# Patient Record
Sex: FEMALE | Race: WHITE | NOT HISPANIC OR LATINO | Employment: OTHER | URBAN - METROPOLITAN AREA
[De-identification: names, ages, dates, MRNs, and addresses within clinical notes are randomized per-mention and may not be internally consistent; named-entity substitution may affect disease eponyms.]

---

## 2018-10-24 ENCOUNTER — HOSPITAL ENCOUNTER (EMERGENCY)
Facility: HOSPITAL | Age: 83
Discharge: HOME | End: 2018-10-24
Attending: EMERGENCY MEDICINE
Payer: MEDICARE

## 2018-10-24 VITALS
BODY MASS INDEX: 20.29 KG/M2 | HEART RATE: 56 BPM | DIASTOLIC BLOOD PRESSURE: 103 MMHG | SYSTOLIC BLOOD PRESSURE: 151 MMHG | HEIGHT: 62 IN | WEIGHT: 110.25 LBS | RESPIRATION RATE: 14 BRPM | TEMPERATURE: 99 F | OXYGEN SATURATION: 100 %

## 2018-10-24 DIAGNOSIS — H81.10 BENIGN PAROXYSMAL POSITIONAL VERTIGO, UNSPECIFIED LATERALITY: Primary | ICD-10-CM

## 2018-10-24 LAB
ALBUMIN SERPL BCP-MCNC: 3.7 G/DL
ALP SERPL-CCNC: 56 U/L
ALT SERPL W/O P-5'-P-CCNC: 14 U/L
ANION GAP SERPL CALC-SCNC: 9 MMOL/L
AST SERPL-CCNC: 21 U/L
BASOPHILS # BLD AUTO: 0 K/UL
BASOPHILS NFR BLD: 0.6 %
BILIRUB SERPL-MCNC: 0.5 MG/DL
BUN SERPL-MCNC: 20 MG/DL
CALCIUM SERPL-MCNC: 9.1 MG/DL
CHLORIDE SERPL-SCNC: 103 MMOL/L
CO2 SERPL-SCNC: 29 MMOL/L
CREAT SERPL-MCNC: 0.8 MG/DL
DIFFERENTIAL METHOD: ABNORMAL
EOSINOPHIL # BLD AUTO: 0.1 K/UL
EOSINOPHIL NFR BLD: 2.1 %
ERYTHROCYTE [DISTWIDTH] IN BLOOD BY AUTOMATED COUNT: 13.3 %
EST. GFR  (AFRICAN AMERICAN): >60 ML/MIN/1.73 M^2
EST. GFR  (NON AFRICAN AMERICAN): >60 ML/MIN/1.73 M^2
GLUCOSE SERPL-MCNC: 98 MG/DL
HCT VFR BLD AUTO: 36.1 %
HGB BLD-MCNC: 11.9 G/DL
LYMPHOCYTES # BLD AUTO: 1.4 K/UL
LYMPHOCYTES NFR BLD: 28.9 %
MAGNESIUM SERPL-MCNC: 2.3 MG/DL
MCH RBC QN AUTO: 30.5 PG
MCHC RBC AUTO-ENTMCNC: 33.1 G/DL
MCV RBC AUTO: 92 FL
MONOCYTES # BLD AUTO: 0.3 K/UL
MONOCYTES NFR BLD: 6 %
NEUTROPHILS # BLD AUTO: 3 K/UL
NEUTROPHILS NFR BLD: 62.4 %
PLATELET # BLD AUTO: 286 K/UL
PMV BLD AUTO: 6.4 FL
POTASSIUM SERPL-SCNC: 4.8 MMOL/L
PROT SERPL-MCNC: 6.6 G/DL
RBC # BLD AUTO: 3.91 M/UL
SODIUM SERPL-SCNC: 141 MMOL/L
WBC # BLD AUTO: 4.8 K/UL

## 2018-10-24 PROCEDURE — 36415 COLL VENOUS BLD VENIPUNCTURE: CPT | Mod: HCWC

## 2018-10-24 PROCEDURE — 85025 COMPLETE CBC W/AUTO DIFF WBC: CPT | Mod: HCWC

## 2018-10-24 PROCEDURE — 25000003 PHARM REV CODE 250: Mod: HCWC | Performed by: EMERGENCY MEDICINE

## 2018-10-24 PROCEDURE — 83735 ASSAY OF MAGNESIUM: CPT | Mod: HCWC

## 2018-10-24 PROCEDURE — 80053 COMPREHEN METABOLIC PANEL: CPT | Mod: HCWC

## 2018-10-24 PROCEDURE — 99284 EMERGENCY DEPT VISIT MOD MDM: CPT | Mod: 25,HCWC

## 2018-10-24 RX ORDER — MECLIZINE HYDROCHLORIDE 25 MG/1
25 TABLET ORAL
Status: COMPLETED | OUTPATIENT
Start: 2018-10-24 | End: 2018-10-24

## 2018-10-24 RX ORDER — MECLIZINE HYDROCHLORIDE 25 MG/1
25 TABLET ORAL 3 TIMES DAILY PRN
Qty: 20 TABLET | Refills: 0 | Status: ON HOLD | OUTPATIENT
Start: 2018-10-24 | End: 2021-11-29 | Stop reason: SDUPTHER

## 2018-10-24 RX ADMIN — MECLIZINE HYDROCHLORIDE 25 MG: 25 TABLET ORAL at 11:10

## 2018-10-24 NOTE — ED PROVIDER NOTES
Encounter Date: 10/24/2018    SCRIBE #1 NOTE: I, Fernando Nickerson, am scribing for, and in the presence of, Dr. Nolen .       History     Chief Complaint   Patient presents with    Dizziness     x 3 days     Time seen by provider: 10:48 AM on 10/24/2018      Keila Romano is a 86 y.o. female with a PMHx of anemia who presents to the ED for further evaluation of dizziness that has been ongoing for 1 week. Patient states that she has been stressed recently due to a large volume of work in the recent weeks. She states that this morning upon standing up she suddenly became dizzy (room spinning) with a bout of lightheadedness. The patient admits that while at rest the dizziness is resolved. Patient admits that currently she feels that upon standing she would have dizziness and some lightheadedness. Per patient, reports that she did get new glasses 1 week ago but denies headache, fever, nausea, abdominal pain, blood in urine, blood in stool, chest pain, and SOB. Patient has had a blood transfusion. She has no pertinent surgical history.       The history is provided by the patient.     Review of patient's allergies indicates:  No Known Allergies  History reviewed. No pertinent past medical history.  History reviewed. No pertinent surgical history.  History reviewed. No pertinent family history.  Social History     Tobacco Use    Smoking status: Never Smoker   Substance Use Topics    Alcohol use: No     Frequency: Never    Drug use: Not on file     Review of Systems   Constitutional: Negative for fever.   HENT: Negative for congestion.    Eyes: Negative for photophobia and visual disturbance.   Respiratory: Negative for shortness of breath.    Cardiovascular: Negative for chest pain.   Gastrointestinal: Negative for abdominal pain, nausea and vomiting.   Musculoskeletal: Negative for back pain.   Skin: Negative for color change.   Neurological: Positive for dizziness and light-headedness. Negative for weakness and  headaches.   Psychiatric/Behavioral: Negative for confusion.       Physical Exam     Initial Vitals [10/24/18 1014]   BP Pulse Resp Temp SpO2   (!) 138/90 66 14 98.7 °F (37.1 °C) 99 %      MAP       --         Physical Exam    Nursing note and vitals reviewed.  Constitutional: She appears well-developed and well-nourished. She is not diaphoretic. No distress.   HENT:   Head: Normocephalic and atraumatic.   Eyes: EOM are normal. Pupils are equal, round, and reactive to light. Right eye exhibits no nystagmus. Left eye exhibits no nystagmus.   Neck: Normal range of motion. Neck supple.   Cardiovascular: Normal rate, regular rhythm, normal heart sounds and intact distal pulses. Exam reveals no gallop and no friction rub.    No murmur heard.  Pulmonary/Chest: Breath sounds normal. No respiratory distress. She has no wheezes. She has no rhonchi. She has no rales.   Abdominal: Soft. Bowel sounds are normal. There is no tenderness. There is no rebound and no guarding.   Musculoskeletal: Normal range of motion.   Neurological: She is alert and oriented to person, place, and time. GCS eye subscore is 4. GCS verbal subscore is 5. GCS motor subscore is 6.   Positive [place] Hallpike.    Skin: Skin is warm and dry.   Psychiatric: She has a normal mood and affect. Her behavior is normal. Judgment and thought content normal.         ED Course   Procedures  Labs Reviewed   CBC W/ AUTO DIFFERENTIAL - Abnormal; Notable for the following components:       Result Value    RBC 3.91 (*)     Hemoglobin 11.9 (*)     Hematocrit 36.1 (*)     MPV 6.4 (*)     All other components within normal limits   COMPREHENSIVE METABOLIC PANEL   MAGNESIUM          Imaging Results          CT Head Without Contrast (Final result)  Result time 10/24/18 11:59:00    Final result by Louie Burns Jr., MD (10/24/18 11:59:00)                 Impression:      Mild brain atrophy with deep white matter ischemic changes otherwise negative CT of the  head.      Electronically signed by: Louie Burns MD  Date:    10/24/2018  Time:    11:59             Narrative:    EXAMINATION:  CT HEAD WITHOUT CONTRAST    CLINICAL HISTORY:  Dizziness;    TECHNIQUE:  Low dose axial images were obtained through the head.  Coronal and sagittal reformations were also performed. Contrast was not administered.    COMPARISON:  None.    FINDINGS:  No cranial fracture is identified.  Scalp edema or hematoma is not noted.    The basal cisterns are clear and symmetric.  No CP angle mass is demonstrated.  There is no mass effect or midline shift.  There is mild enlargement of the cerebral ventricles and of the cerebral sulci particularly in the frontal lobes consistent with brain atrophy.  Hypodensities in the central white matter consistent with deep white matter ischemic changes.  Focal areas of increased or decreased CT density consistent with tumor, edema, CVA or hemorrhage is not seen.  No intracranial hemorrhage or hematoma is noted.                                 Medical Decision Making:   History:   Old Medical Records: I decided to obtain old medical records.  Initial Assessment:   86-year-old female presented with a chief complaint of dizziness.  Differential Diagnosis:   My differential diagnosis includes intracranial mass, intracranial hemorrhage, worsening anemia, and benign worsening vertigo.   Clinical Tests:   Lab Tests: Ordered and Reviewed  Radiological Study: Ordered and Reviewed  ED Management:  The patient was emergently evaluated in the emergency department, her evaluation was significant for an elderly female with a normal neurologic exam.  The patient does have a positive [place]-Hallpike maneuver noted.  The patient's CT scan of her head showed no acute abnormalities.  The patient's labs were significant for the patient's chronic anemia.  The patient is not orthostatic by vital signs in the emergency department.  Per the patient was treated with p.o. meclizine,  with improvement in her symptoms.  The patient's diagnosis is likely benign positional vertigo.  She is stable for discharge to home.  She will be discharged home with p.o. meclizine and she is referred to ENT for follow-up and further care.            Scribe Attestation:   Scribe #1: I performed the above scribed service and the documentation accurately describes the services I performed. I attest to the accuracy of the note.        I, Dr. Giovanni Nolen, personally performed the services described in this documentation. All medical record entries made by the scribe were at my direction and in my presence.  I have reviewed the chart and agree that the record reflects my personal performance and is accurate and complete. Giovanni Nolen MD.  1:30 PM 10/24/2018          Clinical Impression:   The encounter diagnosis was Benign paroxysmal positional vertigo, unspecified laterality.                             Giovanni Nolen MD  10/24/18 9005       Giovanni Nolen MD  10/24/18 3263

## 2018-10-24 NOTE — ED NOTES
"Pt here for eval of "dizziness" (denies @ rest-- indicates with head mov't/standing) - denies change in dietary habits or oral intake & no known fevers or sweats. Pleasant/cooperative/sociable--accompanied by friend.neighbor  "

## 2019-09-25 ENCOUNTER — PES CALL (OUTPATIENT)
Dept: ADMINISTRATIVE | Facility: CLINIC | Age: 84
End: 2019-09-25

## 2021-11-21 ENCOUNTER — HOSPITAL ENCOUNTER (INPATIENT)
Facility: HOSPITAL | Age: 86
LOS: 8 days | Discharge: NURSING, REHAB OR LONG-TERM CARE | DRG: 481 | End: 2021-11-29
Attending: EMERGENCY MEDICINE | Admitting: HOSPITALIST
Payer: MEDICARE

## 2021-11-21 DIAGNOSIS — Z01.810 PREOPERATIVE CARDIOVASCULAR EXAMINATION: ICD-10-CM

## 2021-11-21 DIAGNOSIS — M25.551 RIGHT HIP PAIN: ICD-10-CM

## 2021-11-21 DIAGNOSIS — S72.001A CLOSED RIGHT HIP FRACTURE, INITIAL ENCOUNTER: Primary | ICD-10-CM

## 2021-11-21 DIAGNOSIS — R07.9 CHEST PAIN: ICD-10-CM

## 2021-11-21 PROBLEM — D64.9 ANEMIA: Status: ACTIVE | Noted: 2021-11-21

## 2021-11-21 LAB
ANION GAP SERPL CALC-SCNC: 13 MMOL/L (ref 8–16)
BASOPHILS # BLD AUTO: 0.02 K/UL (ref 0–0.2)
BASOPHILS NFR BLD: 0.3 % (ref 0–1.9)
BUN SERPL-MCNC: 18 MG/DL (ref 8–23)
CALCIUM SERPL-MCNC: 8.6 MG/DL (ref 8.7–10.5)
CHLORIDE SERPL-SCNC: 104 MMOL/L (ref 95–110)
CO2 SERPL-SCNC: 23 MMOL/L (ref 23–29)
CREAT SERPL-MCNC: 0.8 MG/DL (ref 0.5–1.4)
DIFFERENTIAL METHOD: ABNORMAL
EOSINOPHIL # BLD AUTO: 0 K/UL (ref 0–0.5)
EOSINOPHIL NFR BLD: 0 % (ref 0–8)
ERYTHROCYTE [DISTWIDTH] IN BLOOD BY AUTOMATED COUNT: 13.2 % (ref 11.5–14.5)
EST. GFR  (AFRICAN AMERICAN): >60 ML/MIN/1.73 M^2
EST. GFR  (NON AFRICAN AMERICAN): >60 ML/MIN/1.73 M^2
FERRITIN SERPL-MCNC: 112 NG/ML (ref 20–300)
GLUCOSE SERPL-MCNC: 135 MG/DL (ref 70–110)
HCT VFR BLD AUTO: 33.3 % (ref 37–48.5)
HGB BLD-MCNC: 10.5 G/DL (ref 12–16)
IMM GRANULOCYTES # BLD AUTO: 0.03 K/UL (ref 0–0.04)
IMM GRANULOCYTES NFR BLD AUTO: 0.4 % (ref 0–0.5)
LYMPHOCYTES # BLD AUTO: 0.4 K/UL (ref 1–4.8)
LYMPHOCYTES NFR BLD: 5 % (ref 18–48)
MCH RBC QN AUTO: 29.9 PG (ref 27–31)
MCHC RBC AUTO-ENTMCNC: 31.5 G/DL (ref 32–36)
MCV RBC AUTO: 95 FL (ref 82–98)
MONOCYTES # BLD AUTO: 0.3 K/UL (ref 0.3–1)
MONOCYTES NFR BLD: 3.7 % (ref 4–15)
NEUTROPHILS # BLD AUTO: 7.1 K/UL (ref 1.8–7.7)
NEUTROPHILS NFR BLD: 90.6 % (ref 38–73)
NRBC BLD-RTO: 0 /100 WBC
PLATELET # BLD AUTO: 241 K/UL (ref 150–450)
PMV BLD AUTO: 8.4 FL (ref 9.2–12.9)
POTASSIUM SERPL-SCNC: 3.9 MMOL/L (ref 3.5–5.1)
RBC # BLD AUTO: 3.51 M/UL (ref 4–5.4)
SARS-COV-2 RDRP RESP QL NAA+PROBE: NEGATIVE
SODIUM SERPL-SCNC: 140 MMOL/L (ref 136–145)
WBC # BLD AUTO: 7.82 K/UL (ref 3.9–12.7)

## 2021-11-21 PROCEDURE — 63600175 PHARM REV CODE 636 W HCPCS: Performed by: HOSPITALIST

## 2021-11-21 PROCEDURE — 36415 COLL VENOUS BLD VENIPUNCTURE: CPT | Performed by: HOSPITALIST

## 2021-11-21 PROCEDURE — 99285 EMERGENCY DEPT VISIT HI MDM: CPT | Mod: 25

## 2021-11-21 PROCEDURE — 84466 ASSAY OF TRANSFERRIN: CPT | Performed by: HOSPITALIST

## 2021-11-21 PROCEDURE — 12000002 HC ACUTE/MED SURGE SEMI-PRIVATE ROOM

## 2021-11-21 PROCEDURE — U0002 COVID-19 LAB TEST NON-CDC: HCPCS | Performed by: EMERGENCY MEDICINE

## 2021-11-21 PROCEDURE — 36415 COLL VENOUS BLD VENIPUNCTURE: CPT | Performed by: EMERGENCY MEDICINE

## 2021-11-21 PROCEDURE — 80048 BASIC METABOLIC PNL TOTAL CA: CPT | Performed by: EMERGENCY MEDICINE

## 2021-11-21 PROCEDURE — 93005 ELECTROCARDIOGRAM TRACING: CPT

## 2021-11-21 PROCEDURE — 82728 ASSAY OF FERRITIN: CPT | Performed by: HOSPITALIST

## 2021-11-21 PROCEDURE — 85025 COMPLETE CBC W/AUTO DIFF WBC: CPT | Performed by: EMERGENCY MEDICINE

## 2021-11-21 RX ORDER — POLYETHYLENE GLYCOL 3350 17 G/17G
17 POWDER, FOR SOLUTION ORAL DAILY
Status: DISCONTINUED | OUTPATIENT
Start: 2021-11-22 | End: 2021-11-29 | Stop reason: HOSPADM

## 2021-11-21 RX ORDER — MAG HYDROX/ALUMINUM HYD/SIMETH 200-200-20
30 SUSPENSION, ORAL (FINAL DOSE FORM) ORAL 4 TIMES DAILY PRN
Status: DISCONTINUED | OUTPATIENT
Start: 2021-11-21 | End: 2021-11-29 | Stop reason: HOSPADM

## 2021-11-21 RX ORDER — IBUPROFEN 200 MG
16 TABLET ORAL
Status: DISCONTINUED | OUTPATIENT
Start: 2021-11-21 | End: 2021-11-29 | Stop reason: HOSPADM

## 2021-11-21 RX ORDER — SODIUM,POTASSIUM PHOSPHATES 280-250MG
2 POWDER IN PACKET (EA) ORAL
Status: DISCONTINUED | OUTPATIENT
Start: 2021-11-21 | End: 2021-11-29 | Stop reason: HOSPADM

## 2021-11-21 RX ORDER — GLUCAGON 1 MG
1 KIT INJECTION
Status: DISCONTINUED | OUTPATIENT
Start: 2021-11-21 | End: 2021-11-29 | Stop reason: HOSPADM

## 2021-11-21 RX ORDER — ENOXAPARIN SODIUM 100 MG/ML
40 INJECTION SUBCUTANEOUS EVERY 24 HOURS
Status: DISCONTINUED | OUTPATIENT
Start: 2021-11-22 | End: 2021-11-28

## 2021-11-21 RX ORDER — LANOLIN ALCOHOL/MO/W.PET/CERES
800 CREAM (GRAM) TOPICAL
Status: DISCONTINUED | OUTPATIENT
Start: 2021-11-21 | End: 2021-11-27

## 2021-11-21 RX ORDER — SIMETHICONE 80 MG
1 TABLET,CHEWABLE ORAL 4 TIMES DAILY PRN
Status: DISCONTINUED | OUTPATIENT
Start: 2021-11-21 | End: 2021-11-29 | Stop reason: HOSPADM

## 2021-11-21 RX ORDER — PROCHLORPERAZINE EDISYLATE 5 MG/ML
5 INJECTION INTRAMUSCULAR; INTRAVENOUS EVERY 6 HOURS PRN
Status: DISCONTINUED | OUTPATIENT
Start: 2021-11-21 | End: 2021-11-29 | Stop reason: HOSPADM

## 2021-11-21 RX ORDER — ONDANSETRON 8 MG/1
8 TABLET, ORALLY DISINTEGRATING ORAL EVERY 8 HOURS PRN
Status: DISCONTINUED | OUTPATIENT
Start: 2021-11-21 | End: 2021-11-29 | Stop reason: HOSPADM

## 2021-11-21 RX ORDER — TALC
6 POWDER (GRAM) TOPICAL NIGHTLY PRN
Status: DISCONTINUED | OUTPATIENT
Start: 2021-11-21 | End: 2021-11-29 | Stop reason: HOSPADM

## 2021-11-21 RX ORDER — NALOXONE HCL 0.4 MG/ML
0.02 VIAL (ML) INJECTION
Status: DISCONTINUED | OUTPATIENT
Start: 2021-11-21 | End: 2021-11-23

## 2021-11-21 RX ORDER — MORPHINE SULFATE 2 MG/ML
2 INJECTION, SOLUTION INTRAMUSCULAR; INTRAVENOUS EVERY 4 HOURS PRN
Status: DISCONTINUED | OUTPATIENT
Start: 2021-11-21 | End: 2021-11-23

## 2021-11-21 RX ORDER — MORPHINE SULFATE 4 MG/ML
4 INJECTION, SOLUTION INTRAMUSCULAR; INTRAVENOUS EVERY 4 HOURS PRN
Status: DISCONTINUED | OUTPATIENT
Start: 2021-11-21 | End: 2021-11-23

## 2021-11-21 RX ORDER — ACETAMINOPHEN 325 MG/1
650 TABLET ORAL EVERY 4 HOURS PRN
Status: DISCONTINUED | OUTPATIENT
Start: 2021-11-21 | End: 2021-11-22

## 2021-11-21 RX ORDER — SODIUM CHLORIDE 0.9 % (FLUSH) 0.9 %
10 SYRINGE (ML) INJECTION EVERY 8 HOURS PRN
Status: DISCONTINUED | OUTPATIENT
Start: 2021-11-21 | End: 2021-11-29 | Stop reason: HOSPADM

## 2021-11-21 RX ORDER — IBUPROFEN 200 MG
24 TABLET ORAL
Status: DISCONTINUED | OUTPATIENT
Start: 2021-11-21 | End: 2021-11-29 | Stop reason: HOSPADM

## 2021-11-21 RX ORDER — IPRATROPIUM BROMIDE AND ALBUTEROL SULFATE 2.5; .5 MG/3ML; MG/3ML
3 SOLUTION RESPIRATORY (INHALATION) EVERY 6 HOURS PRN
Status: DISCONTINUED | OUTPATIENT
Start: 2021-11-21 | End: 2021-11-29 | Stop reason: HOSPADM

## 2021-11-21 RX ADMIN — MORPHINE SULFATE 4 MG: 4 INJECTION, SOLUTION INTRAMUSCULAR; INTRAVENOUS at 08:11

## 2021-11-22 ENCOUNTER — ANESTHESIA EVENT (OUTPATIENT)
Dept: SURGERY | Facility: HOSPITAL | Age: 86
DRG: 481 | End: 2021-11-22
Payer: MEDICARE

## 2021-11-22 ENCOUNTER — ANESTHESIA (OUTPATIENT)
Dept: SURGERY | Facility: HOSPITAL | Age: 86
DRG: 481 | End: 2021-11-22
Payer: MEDICARE

## 2021-11-22 PROBLEM — M85.80 OSTEOPENIA: Status: ACTIVE | Noted: 2021-11-22

## 2021-11-22 PROBLEM — E44.0 MALNUTRITION OF MODERATE DEGREE: Status: ACTIVE | Noted: 2021-11-22

## 2021-11-22 LAB
BASOPHILS # BLD AUTO: 0.02 K/UL (ref 0–0.2)
BASOPHILS NFR BLD: 0.3 % (ref 0–1.9)
DIFFERENTIAL METHOD: ABNORMAL
EOSINOPHIL # BLD AUTO: 0.1 K/UL (ref 0–0.5)
EOSINOPHIL NFR BLD: 1 % (ref 0–8)
ERYTHROCYTE [DISTWIDTH] IN BLOOD BY AUTOMATED COUNT: 13.3 % (ref 11.5–14.5)
HCT VFR BLD AUTO: 30.2 % (ref 37–48.5)
HGB BLD-MCNC: 9.5 G/DL (ref 12–16)
IMM GRANULOCYTES # BLD AUTO: 0.02 K/UL (ref 0–0.04)
IMM GRANULOCYTES NFR BLD AUTO: 0.3 % (ref 0–0.5)
IRON SERPL-MCNC: 15 UG/DL (ref 30–160)
LYMPHOCYTES # BLD AUTO: 1 K/UL (ref 1–4.8)
LYMPHOCYTES NFR BLD: 14.6 % (ref 18–48)
MCH RBC QN AUTO: 29.7 PG (ref 27–31)
MCHC RBC AUTO-ENTMCNC: 31.5 G/DL (ref 32–36)
MCV RBC AUTO: 94 FL (ref 82–98)
MONOCYTES # BLD AUTO: 0.5 K/UL (ref 0.3–1)
MONOCYTES NFR BLD: 7.6 % (ref 4–15)
NEUTROPHILS # BLD AUTO: 5.2 K/UL (ref 1.8–7.7)
NEUTROPHILS NFR BLD: 76.2 % (ref 38–73)
NRBC BLD-RTO: 0 /100 WBC
PLATELET # BLD AUTO: 239 K/UL (ref 150–450)
PMV BLD AUTO: 8.8 FL (ref 9.2–12.9)
RBC # BLD AUTO: 3.2 M/UL (ref 4–5.4)
SATURATED IRON: 4 % (ref 20–50)
TOTAL IRON BINDING CAPACITY: 360 UG/DL (ref 250–450)
TRANSFERRIN SERPL-MCNC: 243 MG/DL (ref 200–375)
WBC # BLD AUTO: 6.86 K/UL (ref 3.9–12.7)

## 2021-11-22 PROCEDURE — 63600175 PHARM REV CODE 636 W HCPCS: Performed by: NURSE ANESTHETIST, CERTIFIED REGISTERED

## 2021-11-22 PROCEDURE — 99900035 HC TECH TIME PER 15 MIN (STAT)

## 2021-11-22 PROCEDURE — 12000002 HC ACUTE/MED SURGE SEMI-PRIVATE ROOM

## 2021-11-22 PROCEDURE — 25000003 PHARM REV CODE 250: Performed by: NURSE ANESTHETIST, CERTIFIED REGISTERED

## 2021-11-22 PROCEDURE — 37000009 HC ANESTHESIA EA ADD 15 MINS: Performed by: ORTHOPAEDIC SURGERY

## 2021-11-22 PROCEDURE — 63600175 PHARM REV CODE 636 W HCPCS: Performed by: ORTHOPAEDIC SURGERY

## 2021-11-22 PROCEDURE — C1713 ANCHOR/SCREW BN/BN,TIS/BN: HCPCS | Performed by: ORTHOPAEDIC SURGERY

## 2021-11-22 PROCEDURE — 25000003 PHARM REV CODE 250: Performed by: HOSPITALIST

## 2021-11-22 PROCEDURE — 36415 COLL VENOUS BLD VENIPUNCTURE: CPT | Performed by: HOSPITALIST

## 2021-11-22 PROCEDURE — 99900103 DSU ONLY-NO CHARGE-INITIAL HR (STAT): Performed by: ORTHOPAEDIC SURGERY

## 2021-11-22 PROCEDURE — D9220A PRA ANESTHESIA: Mod: CRNA,,, | Performed by: NURSE ANESTHETIST, CERTIFIED REGISTERED

## 2021-11-22 PROCEDURE — 63600175 PHARM REV CODE 636 W HCPCS: Performed by: HOSPITALIST

## 2021-11-22 PROCEDURE — 27201423 OPTIME MED/SURG SUP & DEVICES STERILE SUPPLY: Performed by: ORTHOPAEDIC SURGERY

## 2021-11-22 PROCEDURE — 37000008 HC ANESTHESIA 1ST 15 MINUTES: Performed by: ORTHOPAEDIC SURGERY

## 2021-11-22 PROCEDURE — C1769 GUIDE WIRE: HCPCS | Performed by: ORTHOPAEDIC SURGERY

## 2021-11-22 PROCEDURE — 36000710: Performed by: ORTHOPAEDIC SURGERY

## 2021-11-22 PROCEDURE — 85025 COMPLETE CBC W/AUTO DIFF WBC: CPT | Performed by: HOSPITALIST

## 2021-11-22 PROCEDURE — 36000711: Performed by: ORTHOPAEDIC SURGERY

## 2021-11-22 PROCEDURE — 27200651 HC AIRWAY, LMA: Performed by: ANESTHESIOLOGY

## 2021-11-22 PROCEDURE — 71000033 HC RECOVERY, INTIAL HOUR: Performed by: ORTHOPAEDIC SURGERY

## 2021-11-22 PROCEDURE — D9220A PRA ANESTHESIA: ICD-10-PCS | Mod: CRNA,,, | Performed by: NURSE ANESTHETIST, CERTIFIED REGISTERED

## 2021-11-22 PROCEDURE — D9220A PRA ANESTHESIA: ICD-10-PCS | Mod: ANES,,, | Performed by: ANESTHESIOLOGY

## 2021-11-22 PROCEDURE — 94761 N-INVAS EAR/PLS OXIMETRY MLT: CPT

## 2021-11-22 PROCEDURE — D9220A PRA ANESTHESIA: Mod: ANES,,, | Performed by: ANESTHESIOLOGY

## 2021-11-22 DEVICE — SCREW CANN 16MM THRD 6.5X90 SS: Type: IMPLANTABLE DEVICE | Site: LEG | Status: FUNCTIONAL

## 2021-11-22 DEVICE — SCREW CANN 16MM THRD 6.5X85 SS: Type: IMPLANTABLE DEVICE | Site: LEG | Status: FUNCTIONAL

## 2021-11-22 RX ORDER — FENTANYL CITRATE 50 UG/ML
INJECTION, SOLUTION INTRAMUSCULAR; INTRAVENOUS
Status: DISCONTINUED | OUTPATIENT
Start: 2021-11-22 | End: 2021-11-22

## 2021-11-22 RX ORDER — ACETAMINOPHEN 10 MG/ML
INJECTION, SOLUTION INTRAVENOUS
Status: DISCONTINUED | OUTPATIENT
Start: 2021-11-22 | End: 2021-11-22

## 2021-11-22 RX ORDER — LIDOCAINE HYDROCHLORIDE 20 MG/ML
INJECTION INTRAVENOUS
Status: DISCONTINUED | OUTPATIENT
Start: 2021-11-22 | End: 2021-11-22

## 2021-11-22 RX ORDER — DEXAMETHASONE SODIUM PHOSPHATE 4 MG/ML
INJECTION, SOLUTION INTRA-ARTICULAR; INTRALESIONAL; INTRAMUSCULAR; INTRAVENOUS; SOFT TISSUE
Status: DISCONTINUED | OUTPATIENT
Start: 2021-11-22 | End: 2021-11-22

## 2021-11-22 RX ORDER — PHENYLEPHRINE HYDROCHLORIDE 10 MG/ML
INJECTION INTRAVENOUS
Status: DISCONTINUED | OUTPATIENT
Start: 2021-11-22 | End: 2021-11-22

## 2021-11-22 RX ORDER — ONDANSETRON 2 MG/ML
INJECTION INTRAMUSCULAR; INTRAVENOUS
Status: DISCONTINUED | OUTPATIENT
Start: 2021-11-22 | End: 2021-11-22

## 2021-11-22 RX ORDER — CEFAZOLIN SODIUM 2 G/50ML
SOLUTION INTRAVENOUS
Status: COMPLETED
Start: 2021-11-22 | End: 2021-11-22

## 2021-11-22 RX ORDER — SODIUM CHLORIDE 9 MG/ML
INJECTION, SOLUTION INTRAVENOUS CONTINUOUS
Status: DISCONTINUED | OUTPATIENT
Start: 2021-11-22 | End: 2021-11-26

## 2021-11-22 RX ORDER — CEFAZOLIN SODIUM 2 G/50ML
2 SOLUTION INTRAVENOUS
Status: COMPLETED | OUTPATIENT
Start: 2021-11-22 | End: 2021-11-22

## 2021-11-22 RX ORDER — PROPOFOL 10 MG/ML
VIAL (ML) INTRAVENOUS
Status: DISCONTINUED | OUTPATIENT
Start: 2021-11-22 | End: 2021-11-22

## 2021-11-22 RX ORDER — ACETAMINOPHEN 500 MG
1000 TABLET ORAL EVERY 4 HOURS PRN
Status: DISCONTINUED | OUTPATIENT
Start: 2021-11-22 | End: 2021-11-23

## 2021-11-22 RX ADMIN — FENTANYL CITRATE 25 MCG: 50 INJECTION, SOLUTION INTRAMUSCULAR; INTRAVENOUS at 04:11

## 2021-11-22 RX ADMIN — PHENYLEPHRINE HYDROCHLORIDE 100 MCG: 10 INJECTION INTRAVENOUS at 04:11

## 2021-11-22 RX ADMIN — CEFAZOLIN SODIUM 2 G: 2 SOLUTION INTRAVENOUS at 04:11

## 2021-11-22 RX ADMIN — DEXAMETHASONE SODIUM PHOSPHATE 4 MG: 4 INJECTION, SOLUTION INTRA-ARTICULAR; INTRALESIONAL; INTRAMUSCULAR; INTRAVENOUS; SOFT TISSUE at 04:11

## 2021-11-22 RX ADMIN — ACETAMINOPHEN 600 MG: 10 INJECTION, SOLUTION INTRAVENOUS at 04:11

## 2021-11-22 RX ADMIN — POLYETHYLENE GLYCOL 3350 17 G: 17 POWDER, FOR SOLUTION ORAL at 09:11

## 2021-11-22 RX ADMIN — SODIUM CHLORIDE: 0.9 INJECTION, SOLUTION INTRAVENOUS at 11:11

## 2021-11-22 RX ADMIN — PROPOFOL 100 MG: 10 INJECTION, EMULSION INTRAVENOUS at 04:11

## 2021-11-22 RX ADMIN — ENOXAPARIN SODIUM 40 MG: 100 INJECTION SUBCUTANEOUS at 11:11

## 2021-11-22 RX ADMIN — ONDANSETRON 4 MG: 2 INJECTION, SOLUTION INTRAMUSCULAR; INTRAVENOUS at 04:11

## 2021-11-22 RX ADMIN — IRON SUCROSE 200 MG: 20 INJECTION, SOLUTION INTRAVENOUS at 11:11

## 2021-11-22 RX ADMIN — LIDOCAINE HYDROCHLORIDE 50 MG: 20 INJECTION, SOLUTION INTRAVENOUS at 04:11

## 2021-11-23 LAB
BASOPHILS # BLD AUTO: 0.03 K/UL (ref 0–0.2)
BASOPHILS NFR BLD: 0.5 % (ref 0–1.9)
DIFFERENTIAL METHOD: ABNORMAL
EOSINOPHIL # BLD AUTO: 0.1 K/UL (ref 0–0.5)
EOSINOPHIL NFR BLD: 1.8 % (ref 0–8)
ERYTHROCYTE [DISTWIDTH] IN BLOOD BY AUTOMATED COUNT: 13.3 % (ref 11.5–14.5)
HCT VFR BLD AUTO: 29 % (ref 37–48.5)
HGB BLD-MCNC: 8.9 G/DL (ref 12–16)
IMM GRANULOCYTES # BLD AUTO: 0.03 K/UL (ref 0–0.04)
IMM GRANULOCYTES NFR BLD AUTO: 0.5 % (ref 0–0.5)
LYMPHOCYTES # BLD AUTO: 0.7 K/UL (ref 1–4.8)
LYMPHOCYTES NFR BLD: 11.2 % (ref 18–48)
MCH RBC QN AUTO: 29.4 PG (ref 27–31)
MCHC RBC AUTO-ENTMCNC: 30.7 G/DL (ref 32–36)
MCV RBC AUTO: 96 FL (ref 82–98)
MONOCYTES # BLD AUTO: 0.5 K/UL (ref 0.3–1)
MONOCYTES NFR BLD: 7.6 % (ref 4–15)
NEUTROPHILS # BLD AUTO: 5.2 K/UL (ref 1.8–7.7)
NEUTROPHILS NFR BLD: 78.4 % (ref 38–73)
NRBC BLD-RTO: 0 /100 WBC
PLATELET # BLD AUTO: 213 K/UL (ref 150–450)
PMV BLD AUTO: 8.7 FL (ref 9.2–12.9)
RBC # BLD AUTO: 3.03 M/UL (ref 4–5.4)
WBC # BLD AUTO: 6.59 K/UL (ref 3.9–12.7)

## 2021-11-23 PROCEDURE — 12000002 HC ACUTE/MED SURGE SEMI-PRIVATE ROOM

## 2021-11-23 PROCEDURE — 99900035 HC TECH TIME PER 15 MIN (STAT)

## 2021-11-23 PROCEDURE — 94761 N-INVAS EAR/PLS OXIMETRY MLT: CPT

## 2021-11-23 PROCEDURE — 85025 COMPLETE CBC W/AUTO DIFF WBC: CPT | Performed by: ORTHOPAEDIC SURGERY

## 2021-11-23 PROCEDURE — 36415 COLL VENOUS BLD VENIPUNCTURE: CPT | Performed by: ORTHOPAEDIC SURGERY

## 2021-11-23 PROCEDURE — 25000003 PHARM REV CODE 250: Performed by: ORTHOPAEDIC SURGERY

## 2021-11-23 PROCEDURE — 97166 OT EVAL MOD COMPLEX 45 MIN: CPT

## 2021-11-23 PROCEDURE — 25000003 PHARM REV CODE 250: Performed by: HOSPITALIST

## 2021-11-23 PROCEDURE — 63600175 PHARM REV CODE 636 W HCPCS: Performed by: NURSE PRACTITIONER

## 2021-11-23 PROCEDURE — 63600175 PHARM REV CODE 636 W HCPCS: Performed by: ORTHOPAEDIC SURGERY

## 2021-11-23 PROCEDURE — 97535 SELF CARE MNGMENT TRAINING: CPT

## 2021-11-23 PROCEDURE — 97530 THERAPEUTIC ACTIVITIES: CPT

## 2021-11-23 RX ORDER — MUPIROCIN 20 MG/G
OINTMENT TOPICAL 2 TIMES DAILY
Status: DISPENSED | OUTPATIENT
Start: 2021-11-23 | End: 2021-11-28

## 2021-11-23 RX ORDER — ACETAMINOPHEN 500 MG
1000 TABLET ORAL EVERY 6 HOURS
Status: DISCONTINUED | OUTPATIENT
Start: 2021-11-23 | End: 2021-11-27

## 2021-11-23 RX ORDER — ZIPRASIDONE MESYLATE 20 MG/ML
20 INJECTION, POWDER, LYOPHILIZED, FOR SOLUTION INTRAMUSCULAR ONCE
Status: COMPLETED | OUTPATIENT
Start: 2021-11-23 | End: 2021-11-23

## 2021-11-23 RX ORDER — IBUPROFEN 600 MG/1
600 TABLET ORAL EVERY 6 HOURS PRN
Status: DISCONTINUED | OUTPATIENT
Start: 2021-11-23 | End: 2021-11-29 | Stop reason: HOSPADM

## 2021-11-23 RX ADMIN — POLYETHYLENE GLYCOL 3350 17 G: 17 POWDER, FOR SOLUTION ORAL at 09:11

## 2021-11-23 RX ADMIN — MUPIROCIN: 20 OINTMENT TOPICAL at 08:11

## 2021-11-23 RX ADMIN — IBUPROFEN 600 MG: 600 TABLET ORAL at 02:11

## 2021-11-23 RX ADMIN — ENOXAPARIN SODIUM 40 MG: 100 INJECTION SUBCUTANEOUS at 04:11

## 2021-11-23 RX ADMIN — ACETAMINOPHEN 1000 MG: 500 TABLET ORAL at 09:11

## 2021-11-23 RX ADMIN — ZIPRASIDONE MESYLATE 20 MG: 20 INJECTION, POWDER, LYOPHILIZED, FOR SOLUTION INTRAMUSCULAR at 10:11

## 2021-11-23 RX ADMIN — ACETAMINOPHEN 1000 MG: 500 TABLET ORAL at 08:11

## 2021-11-23 RX ADMIN — MUPIROCIN: 20 OINTMENT TOPICAL at 12:11

## 2021-11-23 RX ADMIN — MELATONIN TAB 3 MG 6 MG: 3 TAB at 09:11

## 2021-11-24 PROBLEM — F03.90 DEMENTIA WITHOUT BEHAVIORAL DISTURBANCE: Status: ACTIVE | Noted: 2021-11-24

## 2021-11-24 PROCEDURE — 25000003 PHARM REV CODE 250: Performed by: HOSPITALIST

## 2021-11-24 PROCEDURE — 99900035 HC TECH TIME PER 15 MIN (STAT)

## 2021-11-24 PROCEDURE — 97161 PT EVAL LOW COMPLEX 20 MIN: CPT

## 2021-11-24 PROCEDURE — 97530 THERAPEUTIC ACTIVITIES: CPT

## 2021-11-24 PROCEDURE — 12000002 HC ACUTE/MED SURGE SEMI-PRIVATE ROOM

## 2021-11-24 PROCEDURE — 30200315 PPD INTRADERMAL TEST REV CODE 302: Performed by: HOSPITALIST

## 2021-11-24 PROCEDURE — 94761 N-INVAS EAR/PLS OXIMETRY MLT: CPT

## 2021-11-24 PROCEDURE — 86580 TB INTRADERMAL TEST: CPT | Performed by: HOSPITALIST

## 2021-11-24 PROCEDURE — 63600175 PHARM REV CODE 636 W HCPCS: Performed by: ORTHOPAEDIC SURGERY

## 2021-11-24 RX ADMIN — ENOXAPARIN SODIUM 40 MG: 100 INJECTION SUBCUTANEOUS at 05:11

## 2021-11-24 RX ADMIN — MUPIROCIN: 20 OINTMENT TOPICAL at 08:11

## 2021-11-24 RX ADMIN — ACETAMINOPHEN 1000 MG: 500 TABLET ORAL at 11:11

## 2021-11-24 RX ADMIN — ACETAMINOPHEN 1000 MG: 500 TABLET ORAL at 05:11

## 2021-11-24 RX ADMIN — TUBERCULIN PURIFIED PROTEIN DERIVATIVE 5 UNITS: 5 INJECTION, SOLUTION INTRADERMAL at 03:11

## 2021-11-25 LAB
BASOPHILS # BLD AUTO: 0.03 K/UL (ref 0–0.2)
BASOPHILS NFR BLD: 0.5 % (ref 0–1.9)
DIFFERENTIAL METHOD: ABNORMAL
EOSINOPHIL # BLD AUTO: 0.3 K/UL (ref 0–0.5)
EOSINOPHIL NFR BLD: 4.2 % (ref 0–8)
ERYTHROCYTE [DISTWIDTH] IN BLOOD BY AUTOMATED COUNT: 13.2 % (ref 11.5–14.5)
HCT VFR BLD AUTO: 28.5 % (ref 37–48.5)
HGB BLD-MCNC: 9.1 G/DL (ref 12–16)
IMM GRANULOCYTES # BLD AUTO: 0.04 K/UL (ref 0–0.04)
IMM GRANULOCYTES NFR BLD AUTO: 0.6 % (ref 0–0.5)
LYMPHOCYTES # BLD AUTO: 1.1 K/UL (ref 1–4.8)
LYMPHOCYTES NFR BLD: 16.6 % (ref 18–48)
MCH RBC QN AUTO: 29.6 PG (ref 27–31)
MCHC RBC AUTO-ENTMCNC: 31.9 G/DL (ref 32–36)
MCV RBC AUTO: 93 FL (ref 82–98)
MONOCYTES # BLD AUTO: 0.5 K/UL (ref 0.3–1)
MONOCYTES NFR BLD: 7.4 % (ref 4–15)
NEUTROPHILS # BLD AUTO: 4.6 K/UL (ref 1.8–7.7)
NEUTROPHILS NFR BLD: 70.7 % (ref 38–73)
NRBC BLD-RTO: 0 /100 WBC
PLATELET # BLD AUTO: 271 K/UL (ref 150–450)
PMV BLD AUTO: 8.7 FL (ref 9.2–12.9)
RBC # BLD AUTO: 3.07 M/UL (ref 4–5.4)
WBC # BLD AUTO: 6.49 K/UL (ref 3.9–12.7)

## 2021-11-25 PROCEDURE — 25000003 PHARM REV CODE 250: Performed by: HOSPITALIST

## 2021-11-25 PROCEDURE — 85025 COMPLETE CBC W/AUTO DIFF WBC: CPT | Performed by: HOSPITALIST

## 2021-11-25 PROCEDURE — 97530 THERAPEUTIC ACTIVITIES: CPT

## 2021-11-25 PROCEDURE — 94761 N-INVAS EAR/PLS OXIMETRY MLT: CPT

## 2021-11-25 PROCEDURE — 12000002 HC ACUTE/MED SURGE SEMI-PRIVATE ROOM

## 2021-11-25 PROCEDURE — 97110 THERAPEUTIC EXERCISES: CPT

## 2021-11-25 PROCEDURE — 36415 COLL VENOUS BLD VENIPUNCTURE: CPT | Performed by: HOSPITALIST

## 2021-11-25 PROCEDURE — 99900035 HC TECH TIME PER 15 MIN (STAT)

## 2021-11-25 PROCEDURE — 63600175 PHARM REV CODE 636 W HCPCS: Performed by: ORTHOPAEDIC SURGERY

## 2021-11-25 RX ORDER — FERROUS SULFATE, DRIED 160(50) MG
2 TABLET, EXTENDED RELEASE ORAL 2 TIMES DAILY
Status: DISCONTINUED | OUTPATIENT
Start: 2021-11-25 | End: 2021-11-29 | Stop reason: HOSPADM

## 2021-11-25 RX ADMIN — ENOXAPARIN SODIUM 40 MG: 100 INJECTION SUBCUTANEOUS at 05:11

## 2021-11-25 RX ADMIN — MUPIROCIN: 20 OINTMENT TOPICAL at 09:11

## 2021-11-25 RX ADMIN — Medication 2 TABLET: at 09:11

## 2021-11-25 RX ADMIN — ACETAMINOPHEN 1000 MG: 500 TABLET ORAL at 05:11

## 2021-11-25 RX ADMIN — ACETAMINOPHEN 1000 MG: 500 TABLET ORAL at 11:11

## 2021-11-26 LAB — TB INDURATION 48 - 72 HR READ: 0 MM

## 2021-11-26 PROCEDURE — 12000002 HC ACUTE/MED SURGE SEMI-PRIVATE ROOM

## 2021-11-26 PROCEDURE — 63600175 PHARM REV CODE 636 W HCPCS: Performed by: ORTHOPAEDIC SURGERY

## 2021-11-26 PROCEDURE — 94761 N-INVAS EAR/PLS OXIMETRY MLT: CPT

## 2021-11-26 PROCEDURE — 97116 GAIT TRAINING THERAPY: CPT

## 2021-11-26 PROCEDURE — 25000003 PHARM REV CODE 250: Performed by: HOSPITALIST

## 2021-11-26 PROCEDURE — 99900035 HC TECH TIME PER 15 MIN (STAT)

## 2021-11-26 PROCEDURE — 97530 THERAPEUTIC ACTIVITIES: CPT

## 2021-11-26 PROCEDURE — 25000003 PHARM REV CODE 250: Performed by: ORTHOPAEDIC SURGERY

## 2021-11-26 RX ORDER — IBUPROFEN 600 MG/1
600 TABLET ORAL EVERY 6 HOURS PRN
Qty: 30 TABLET | Refills: 0 | Status: SHIPPED | OUTPATIENT
Start: 2021-11-26 | End: 2021-11-29

## 2021-11-26 RX ORDER — ASPIRIN 81 MG/1
81 TABLET ORAL 2 TIMES DAILY
Qty: 60 TABLET | Refills: 0 | Status: SHIPPED | OUTPATIENT
Start: 2021-11-26 | End: 2021-11-29 | Stop reason: SDUPTHER

## 2021-11-26 RX ORDER — SIMETHICONE 80 MG
80 TABLET,CHEWABLE ORAL 4 TIMES DAILY PRN
Qty: 30 TABLET | Refills: 0 | Status: SHIPPED | OUTPATIENT
Start: 2021-11-26 | End: 2021-11-29

## 2021-11-26 RX ORDER — POLYETHYLENE GLYCOL 3350 17 G/17G
17 POWDER, FOR SOLUTION ORAL DAILY
Qty: 30 PACKET | Refills: 0 | Status: SHIPPED | OUTPATIENT
Start: 2021-11-27 | End: 2021-11-29

## 2021-11-26 RX ORDER — FERROUS SULFATE, DRIED 160(50) MG
2 TABLET, EXTENDED RELEASE ORAL 2 TIMES DAILY
Qty: 120 TABLET | Refills: 11 | Status: SHIPPED | OUTPATIENT
Start: 2021-11-26 | End: 2021-11-29

## 2021-11-26 RX ORDER — ACETAMINOPHEN 500 MG
1000 TABLET ORAL EVERY 6 HOURS
Qty: 30 TABLET | Refills: 0 | Status: SHIPPED | OUTPATIENT
Start: 2021-11-26 | End: 2021-11-29 | Stop reason: HOSPADM

## 2021-11-26 RX ADMIN — ACETAMINOPHEN 1000 MG: 500 TABLET ORAL at 11:11

## 2021-11-26 RX ADMIN — MUPIROCIN: 20 OINTMENT TOPICAL at 09:11

## 2021-11-26 RX ADMIN — SODIUM CHLORIDE: 0.9 INJECTION, SOLUTION INTRAVENOUS at 12:11

## 2021-11-26 RX ADMIN — Medication 2 TABLET: at 10:11

## 2021-11-26 RX ADMIN — Medication 2 TABLET: at 09:11

## 2021-11-26 RX ADMIN — ACETAMINOPHEN 1000 MG: 500 TABLET ORAL at 05:11

## 2021-11-26 RX ADMIN — ENOXAPARIN SODIUM 40 MG: 100 INJECTION SUBCUTANEOUS at 05:11

## 2021-11-27 PROCEDURE — 12000002 HC ACUTE/MED SURGE SEMI-PRIVATE ROOM

## 2021-11-27 PROCEDURE — 97530 THERAPEUTIC ACTIVITIES: CPT

## 2021-11-27 PROCEDURE — 25000003 PHARM REV CODE 250: Performed by: HOSPITALIST

## 2021-11-27 PROCEDURE — 99900035 HC TECH TIME PER 15 MIN (STAT)

## 2021-11-27 PROCEDURE — 63600175 PHARM REV CODE 636 W HCPCS: Performed by: ORTHOPAEDIC SURGERY

## 2021-11-27 PROCEDURE — 25000003 PHARM REV CODE 250: Performed by: ORTHOPAEDIC SURGERY

## 2021-11-27 PROCEDURE — 97110 THERAPEUTIC EXERCISES: CPT

## 2021-11-27 PROCEDURE — 94761 N-INVAS EAR/PLS OXIMETRY MLT: CPT

## 2021-11-27 RX ORDER — FERROUS GLUCONATE 324(38)MG
324 TABLET ORAL
Status: DISCONTINUED | OUTPATIENT
Start: 2021-11-28 | End: 2021-11-29 | Stop reason: HOSPADM

## 2021-11-27 RX ORDER — ACETAMINOPHEN 500 MG
1000 TABLET ORAL EVERY 8 HOURS PRN
Status: DISCONTINUED | OUTPATIENT
Start: 2021-11-27 | End: 2021-11-29 | Stop reason: HOSPADM

## 2021-11-27 RX ADMIN — MUPIROCIN: 20 OINTMENT TOPICAL at 09:11

## 2021-11-27 RX ADMIN — Medication 2 TABLET: at 09:11

## 2021-11-27 RX ADMIN — MUPIROCIN: 20 OINTMENT TOPICAL at 08:11

## 2021-11-27 RX ADMIN — ENOXAPARIN SODIUM 40 MG: 100 INJECTION SUBCUTANEOUS at 04:11

## 2021-11-27 RX ADMIN — POLYETHYLENE GLYCOL 3350 17 G: 17 POWDER, FOR SOLUTION ORAL at 09:11

## 2021-11-27 RX ADMIN — ACETAMINOPHEN 1000 MG: 500 TABLET ORAL at 05:11

## 2021-11-27 RX ADMIN — MELATONIN TAB 3 MG 6 MG: 3 TAB at 08:11

## 2021-11-27 RX ADMIN — Medication 2 TABLET: at 08:11

## 2021-11-28 PROCEDURE — 25000003 PHARM REV CODE 250: Performed by: ORTHOPAEDIC SURGERY

## 2021-11-28 PROCEDURE — 97110 THERAPEUTIC EXERCISES: CPT

## 2021-11-28 PROCEDURE — 25000003 PHARM REV CODE 250: Performed by: HOSPITALIST

## 2021-11-28 PROCEDURE — 99900035 HC TECH TIME PER 15 MIN (STAT)

## 2021-11-28 PROCEDURE — 25000003 PHARM REV CODE 250: Performed by: STUDENT IN AN ORGANIZED HEALTH CARE EDUCATION/TRAINING PROGRAM

## 2021-11-28 PROCEDURE — 12000002 HC ACUTE/MED SURGE SEMI-PRIVATE ROOM

## 2021-11-28 PROCEDURE — 94761 N-INVAS EAR/PLS OXIMETRY MLT: CPT

## 2021-11-28 PROCEDURE — 97116 GAIT TRAINING THERAPY: CPT

## 2021-11-28 RX ORDER — AMOXICILLIN 250 MG
1 CAPSULE ORAL DAILY
Status: DISCONTINUED | OUTPATIENT
Start: 2021-11-28 | End: 2021-11-29 | Stop reason: HOSPADM

## 2021-11-28 RX ORDER — NAPROXEN SODIUM 220 MG/1
81 TABLET, FILM COATED ORAL 2 TIMES DAILY
Status: DISCONTINUED | OUTPATIENT
Start: 2021-11-28 | End: 2021-11-29 | Stop reason: HOSPADM

## 2021-11-28 RX ADMIN — Medication 2 TABLET: at 08:11

## 2021-11-28 RX ADMIN — POLYETHYLENE GLYCOL 3350 17 G: 17 POWDER, FOR SOLUTION ORAL at 08:11

## 2021-11-28 RX ADMIN — ASPIRIN 81 MG CHEWABLE TABLET 81 MG: 81 TABLET CHEWABLE at 08:11

## 2021-11-28 RX ADMIN — DOCUSATE SODIUM AND SENNOSIDES 1 TABLET: 8.6; 5 TABLET, FILM COATED ORAL at 08:11

## 2021-11-28 RX ADMIN — FERROUS GLUCONATE 324 MG: 324 TABLET ORAL at 08:11

## 2021-11-29 VITALS
OXYGEN SATURATION: 96 % | BODY MASS INDEX: 16.9 KG/M2 | HEART RATE: 70 BPM | TEMPERATURE: 97 F | SYSTOLIC BLOOD PRESSURE: 118 MMHG | DIASTOLIC BLOOD PRESSURE: 58 MMHG | RESPIRATION RATE: 18 BRPM | HEIGHT: 64 IN | WEIGHT: 99 LBS

## 2021-11-29 LAB
ANION GAP SERPL CALC-SCNC: 12 MMOL/L (ref 8–16)
BASOPHILS # BLD AUTO: 0.05 K/UL (ref 0–0.2)
BASOPHILS NFR BLD: 0.8 % (ref 0–1.9)
BUN SERPL-MCNC: 26 MG/DL (ref 8–23)
CALCIUM SERPL-MCNC: 9.4 MG/DL (ref 8.7–10.5)
CHLORIDE SERPL-SCNC: 99 MMOL/L (ref 95–110)
CO2 SERPL-SCNC: 27 MMOL/L (ref 23–29)
CREAT SERPL-MCNC: 0.7 MG/DL (ref 0.5–1.4)
DIFFERENTIAL METHOD: ABNORMAL
EOSINOPHIL # BLD AUTO: 0.2 K/UL (ref 0–0.5)
EOSINOPHIL NFR BLD: 3.2 % (ref 0–8)
ERYTHROCYTE [DISTWIDTH] IN BLOOD BY AUTOMATED COUNT: 13.4 % (ref 11.5–14.5)
EST. GFR  (AFRICAN AMERICAN): >60 ML/MIN/1.73 M^2
EST. GFR  (NON AFRICAN AMERICAN): >60 ML/MIN/1.73 M^2
GLUCOSE SERPL-MCNC: 104 MG/DL (ref 70–110)
HCT VFR BLD AUTO: 29 % (ref 37–48.5)
HGB BLD-MCNC: 9.2 G/DL (ref 12–16)
IMM GRANULOCYTES # BLD AUTO: 0.03 K/UL (ref 0–0.04)
IMM GRANULOCYTES NFR BLD AUTO: 0.5 % (ref 0–0.5)
LYMPHOCYTES # BLD AUTO: 1.6 K/UL (ref 1–4.8)
LYMPHOCYTES NFR BLD: 24.8 % (ref 18–48)
MCH RBC QN AUTO: 29.7 PG (ref 27–31)
MCHC RBC AUTO-ENTMCNC: 31.7 G/DL (ref 32–36)
MCV RBC AUTO: 94 FL (ref 82–98)
MONOCYTES # BLD AUTO: 0.7 K/UL (ref 0.3–1)
MONOCYTES NFR BLD: 10.4 % (ref 4–15)
NEUTROPHILS # BLD AUTO: 3.8 K/UL (ref 1.8–7.7)
NEUTROPHILS NFR BLD: 60.3 % (ref 38–73)
NRBC BLD-RTO: 0 /100 WBC
PLATELET # BLD AUTO: 347 K/UL (ref 150–450)
PMV BLD AUTO: 8.3 FL (ref 9.2–12.9)
POTASSIUM SERPL-SCNC: 4.3 MMOL/L (ref 3.5–5.1)
RBC # BLD AUTO: 3.1 M/UL (ref 4–5.4)
SODIUM SERPL-SCNC: 138 MMOL/L (ref 136–145)
WBC # BLD AUTO: 6.26 K/UL (ref 3.9–12.7)

## 2021-11-29 PROCEDURE — 97116 GAIT TRAINING THERAPY: CPT

## 2021-11-29 PROCEDURE — 25000003 PHARM REV CODE 250: Performed by: HOSPITALIST

## 2021-11-29 PROCEDURE — 97535 SELF CARE MNGMENT TRAINING: CPT | Mod: CO

## 2021-11-29 PROCEDURE — 97530 THERAPEUTIC ACTIVITIES: CPT

## 2021-11-29 PROCEDURE — 85025 COMPLETE CBC W/AUTO DIFF WBC: CPT | Performed by: STUDENT IN AN ORGANIZED HEALTH CARE EDUCATION/TRAINING PROGRAM

## 2021-11-29 PROCEDURE — 25000003 PHARM REV CODE 250: Performed by: ORTHOPAEDIC SURGERY

## 2021-11-29 PROCEDURE — 25000003 PHARM REV CODE 250: Performed by: STUDENT IN AN ORGANIZED HEALTH CARE EDUCATION/TRAINING PROGRAM

## 2021-11-29 PROCEDURE — 94760 N-INVAS EAR/PLS OXIMETRY 1: CPT

## 2021-11-29 PROCEDURE — 36415 COLL VENOUS BLD VENIPUNCTURE: CPT | Performed by: STUDENT IN AN ORGANIZED HEALTH CARE EDUCATION/TRAINING PROGRAM

## 2021-11-29 PROCEDURE — 99900035 HC TECH TIME PER 15 MIN (STAT)

## 2021-11-29 PROCEDURE — 80048 BASIC METABOLIC PNL TOTAL CA: CPT | Performed by: STUDENT IN AN ORGANIZED HEALTH CARE EDUCATION/TRAINING PROGRAM

## 2021-11-29 PROCEDURE — 94761 N-INVAS EAR/PLS OXIMETRY MLT: CPT

## 2021-11-29 RX ORDER — NAPROXEN SODIUM 220 MG/1
81 TABLET, FILM COATED ORAL 2 TIMES DAILY
Status: CANCELLED | OUTPATIENT
Start: 2021-11-29

## 2021-11-29 RX ORDER — IBUPROFEN 200 MG
24 TABLET ORAL
Status: CANCELLED | OUTPATIENT
Start: 2021-11-29

## 2021-11-29 RX ORDER — MAG HYDROX/ALUMINUM HYD/SIMETH 200-200-20
30 SUSPENSION, ORAL (FINAL DOSE FORM) ORAL 4 TIMES DAILY PRN
Status: CANCELLED | OUTPATIENT
Start: 2021-11-29

## 2021-11-29 RX ORDER — IPRATROPIUM BROMIDE AND ALBUTEROL SULFATE 2.5; .5 MG/3ML; MG/3ML
3 SOLUTION RESPIRATORY (INHALATION) EVERY 6 HOURS PRN
Status: CANCELLED | OUTPATIENT
Start: 2021-11-29

## 2021-11-29 RX ORDER — SODIUM CHLORIDE 0.9 % (FLUSH) 0.9 %
10 SYRINGE (ML) INJECTION EVERY 8 HOURS PRN
Status: CANCELLED | OUTPATIENT
Start: 2021-11-29

## 2021-11-29 RX ORDER — IBUPROFEN 600 MG/1
600 TABLET ORAL EVERY 6 HOURS PRN
Status: CANCELLED | OUTPATIENT
Start: 2021-11-29

## 2021-11-29 RX ORDER — SIMETHICONE 80 MG
1 TABLET,CHEWABLE ORAL 4 TIMES DAILY PRN
Status: CANCELLED | OUTPATIENT
Start: 2021-11-29

## 2021-11-29 RX ORDER — SODIUM,POTASSIUM PHOSPHATES 280-250MG
2 POWDER IN PACKET (EA) ORAL
Status: CANCELLED | OUTPATIENT
Start: 2021-11-29

## 2021-11-29 RX ORDER — AMOXICILLIN 250 MG
1 CAPSULE ORAL DAILY
Status: CANCELLED | OUTPATIENT
Start: 2021-11-30

## 2021-11-29 RX ORDER — POLYETHYLENE GLYCOL 3350 17 G/17G
17 POWDER, FOR SOLUTION ORAL DAILY
Qty: 30 PACKET | Refills: 0
Start: 2021-11-29 | End: 2022-10-31

## 2021-11-29 RX ORDER — ACETAMINOPHEN 500 MG
1000 TABLET ORAL EVERY 8 HOURS PRN
Refills: 0
Start: 2021-11-29 | End: 2022-04-06

## 2021-11-29 RX ORDER — PROCHLORPERAZINE EDISYLATE 5 MG/ML
5 INJECTION INTRAMUSCULAR; INTRAVENOUS EVERY 6 HOURS PRN
Status: CANCELLED | OUTPATIENT
Start: 2021-11-29

## 2021-11-29 RX ORDER — ASPIRIN 81 MG/1
81 TABLET ORAL 2 TIMES DAILY
Qty: 60 TABLET | Refills: 0
Start: 2021-11-29 | End: 2022-10-31 | Stop reason: SDUPTHER

## 2021-11-29 RX ORDER — GLUCAGON 1 MG
1 KIT INJECTION
Status: CANCELLED | OUTPATIENT
Start: 2021-11-29

## 2021-11-29 RX ORDER — FERROUS GLUCONATE 324(38)MG
324 TABLET ORAL
Start: 2021-11-30 | End: 2022-10-31

## 2021-11-29 RX ORDER — MECLIZINE HYDROCHLORIDE 25 MG/1
25 TABLET ORAL 3 TIMES DAILY PRN
Qty: 20 TABLET | Refills: 0 | Status: ON HOLD
Start: 2021-11-29 | End: 2021-12-27 | Stop reason: HOSPADM

## 2021-11-29 RX ORDER — POLYETHYLENE GLYCOL 3350 17 G/17G
17 POWDER, FOR SOLUTION ORAL DAILY
Status: CANCELLED | OUTPATIENT
Start: 2021-11-30

## 2021-11-29 RX ORDER — IBUPROFEN 600 MG/1
600 TABLET ORAL EVERY 6 HOURS PRN
Qty: 30 TABLET | Refills: 0
Start: 2021-11-29 | End: 2022-04-06

## 2021-11-29 RX ORDER — TALC
6 POWDER (GRAM) TOPICAL NIGHTLY PRN
Status: CANCELLED | OUTPATIENT
Start: 2021-11-29

## 2021-11-29 RX ORDER — ONDANSETRON 8 MG/1
8 TABLET, ORALLY DISINTEGRATING ORAL EVERY 8 HOURS PRN
Status: CANCELLED | OUTPATIENT
Start: 2021-11-29

## 2021-11-29 RX ORDER — ACETAMINOPHEN 500 MG
1000 TABLET ORAL EVERY 8 HOURS PRN
Status: CANCELLED | OUTPATIENT
Start: 2021-11-29

## 2021-11-29 RX ORDER — SIMETHICONE 80 MG
80 TABLET,CHEWABLE ORAL 4 TIMES DAILY PRN
Qty: 30 TABLET | Refills: 0
Start: 2021-11-29 | End: 2022-04-06

## 2021-11-29 RX ORDER — FERROUS GLUCONATE 324(38)MG
324 TABLET ORAL
Status: CANCELLED | OUTPATIENT
Start: 2021-11-30

## 2021-11-29 RX ORDER — IBUPROFEN 200 MG
16 TABLET ORAL
Status: CANCELLED | OUTPATIENT
Start: 2021-11-29

## 2021-11-29 RX ORDER — FERROUS SULFATE, DRIED 160(50) MG
2 TABLET, EXTENDED RELEASE ORAL 2 TIMES DAILY
Qty: 120 TABLET | Refills: 11
Start: 2021-11-29 | End: 2022-10-31

## 2021-11-29 RX ORDER — AMOXICILLIN 250 MG
1 CAPSULE ORAL DAILY
Start: 2021-11-30 | End: 2022-10-31

## 2021-11-29 RX ORDER — FERROUS SULFATE, DRIED 160(50) MG
2 TABLET, EXTENDED RELEASE ORAL 2 TIMES DAILY
Status: CANCELLED | OUTPATIENT
Start: 2021-11-29

## 2021-11-29 RX ADMIN — DOCUSATE SODIUM AND SENNOSIDES 1 TABLET: 8.6; 5 TABLET, FILM COATED ORAL at 08:11

## 2021-11-29 RX ADMIN — Medication 2 TABLET: at 08:11

## 2021-11-29 RX ADMIN — POLYETHYLENE GLYCOL 3350 17 G: 17 POWDER, FOR SOLUTION ORAL at 08:11

## 2021-11-29 RX ADMIN — ASPIRIN 81 MG CHEWABLE TABLET 81 MG: 81 TABLET CHEWABLE at 08:11

## 2021-11-30 PROBLEM — Z71.89 ADVANCE CARE PLANNING: Status: ACTIVE | Noted: 2021-11-30

## 2021-11-30 PROBLEM — Z75.8 DISCHARGE PLANNING ISSUES: Status: ACTIVE | Noted: 2021-11-30

## 2021-11-30 PROBLEM — E53.8 VITAMIN B12 DEFICIENCY: Status: ACTIVE | Noted: 2021-11-30

## 2021-12-01 PROBLEM — R82.90 FOUL SMELLING URINE: Status: ACTIVE | Noted: 2021-12-01

## 2021-12-10 PROBLEM — F03.918 DEMENTIA WITH BEHAVIORAL DISTURBANCE: Status: ACTIVE | Noted: 2021-11-24

## 2021-12-22 ENCOUNTER — HOSPITAL ENCOUNTER (INPATIENT)
Facility: HOSPITAL | Age: 86
LOS: 4 days | Discharge: PSYCHIATRIC OR SUBSTANCE USE FACILITY | DRG: 690 | End: 2021-12-27
Attending: EMERGENCY MEDICINE | Admitting: INTERNAL MEDICINE
Payer: MEDICARE

## 2021-12-22 DIAGNOSIS — F03.91 DEMENTIA WITH BEHAVIORAL DISTURBANCE, UNSPECIFIED DEMENTIA TYPE: Primary | ICD-10-CM

## 2021-12-22 DIAGNOSIS — R07.9 CHEST PAIN: ICD-10-CM

## 2021-12-22 DIAGNOSIS — N39.0 URINARY TRACT INFECTION WITHOUT HEMATURIA, SITE UNSPECIFIED: ICD-10-CM

## 2021-12-22 DIAGNOSIS — R41.0 CONFUSION: ICD-10-CM

## 2021-12-22 DIAGNOSIS — G93.41 METABOLIC ENCEPHALOPATHY: ICD-10-CM

## 2021-12-22 DIAGNOSIS — G93.40 ACUTE ENCEPHALOPATHY: ICD-10-CM

## 2021-12-22 PROBLEM — N30.00 ACUTE CYSTITIS: Status: ACTIVE | Noted: 2021-12-22

## 2021-12-22 PROBLEM — R41.82 ALTERED MENTAL STATUS: Status: ACTIVE | Noted: 2021-12-22

## 2021-12-22 LAB
ALBUMIN SERPL BCP-MCNC: 3.2 G/DL (ref 3.5–5.2)
ALP SERPL-CCNC: 86 U/L (ref 55–135)
ALT SERPL W/O P-5'-P-CCNC: 22 U/L (ref 10–44)
AMMONIA PLAS-SCNC: 13 UMOL/L (ref 10–50)
AMPHET+METHAMPHET UR QL: NEGATIVE
ANION GAP SERPL CALC-SCNC: 10 MMOL/L (ref 8–16)
APAP SERPL-MCNC: <10 UG/ML (ref 10–20)
AST SERPL-CCNC: 24 U/L (ref 10–40)
BACTERIA #/AREA URNS HPF: ABNORMAL /HPF
BARBITURATES UR QL SCN>200 NG/ML: NEGATIVE
BASOPHILS # BLD AUTO: 0.04 K/UL (ref 0–0.2)
BASOPHILS NFR BLD: 0.9 % (ref 0–1.9)
BENZODIAZ UR QL SCN>200 NG/ML: NEGATIVE
BILIRUB SERPL-MCNC: 0.7 MG/DL (ref 0.1–1)
BILIRUB UR QL STRIP: NEGATIVE
BUN SERPL-MCNC: 24 MG/DL (ref 8–23)
BZE UR QL SCN: NEGATIVE
CALCIUM SERPL-MCNC: 8.9 MG/DL (ref 8.7–10.5)
CANNABINOIDS UR QL SCN: NEGATIVE
CHLORIDE SERPL-SCNC: 100 MMOL/L (ref 95–110)
CLARITY UR: ABNORMAL
CO2 SERPL-SCNC: 29 MMOL/L (ref 23–29)
COLOR UR: YELLOW
CREAT SERPL-MCNC: 0.7 MG/DL (ref 0.5–1.4)
CREAT UR-MCNC: 51 MG/DL (ref 15–325)
DIFFERENTIAL METHOD: ABNORMAL
EOSINOPHIL # BLD AUTO: 0.2 K/UL (ref 0–0.5)
EOSINOPHIL NFR BLD: 4.5 % (ref 0–8)
ERYTHROCYTE [DISTWIDTH] IN BLOOD BY AUTOMATED COUNT: 14.4 % (ref 11.5–14.5)
EST. GFR  (AFRICAN AMERICAN): >60 ML/MIN/1.73 M^2
EST. GFR  (NON AFRICAN AMERICAN): >60 ML/MIN/1.73 M^2
ETHANOL SERPL-MCNC: <5 MG/DL
GLUCOSE SERPL-MCNC: 100 MG/DL (ref 70–110)
GLUCOSE UR QL STRIP: NEGATIVE
HCT VFR BLD AUTO: 35.6 % (ref 37–48.5)
HGB BLD-MCNC: 11.1 G/DL (ref 12–16)
HGB UR QL STRIP: NEGATIVE
HYALINE CASTS #/AREA URNS LPF: 15 /LPF
IMM GRANULOCYTES # BLD AUTO: 0.02 K/UL (ref 0–0.04)
IMM GRANULOCYTES NFR BLD AUTO: 0.4 % (ref 0–0.5)
KETONES UR QL STRIP: NEGATIVE
LACTATE SERPL-SCNC: 1.7 MMOL/L (ref 0.5–1.9)
LEUKOCYTE ESTERASE UR QL STRIP: ABNORMAL
LYMPHOCYTES # BLD AUTO: 1.2 K/UL (ref 1–4.8)
LYMPHOCYTES NFR BLD: 26 % (ref 18–48)
MCH RBC QN AUTO: 29.5 PG (ref 27–31)
MCHC RBC AUTO-ENTMCNC: 31.2 G/DL (ref 32–36)
MCV RBC AUTO: 95 FL (ref 82–98)
MICROSCOPIC COMMENT: ABNORMAL
MONOCYTES # BLD AUTO: 0.4 K/UL (ref 0.3–1)
MONOCYTES NFR BLD: 7.7 % (ref 4–15)
NEUTROPHILS # BLD AUTO: 2.9 K/UL (ref 1.8–7.7)
NEUTROPHILS NFR BLD: 60.5 % (ref 38–73)
NITRITE UR QL STRIP: POSITIVE
NRBC BLD-RTO: 0 /100 WBC
OPIATES UR QL SCN: NEGATIVE
PCP UR QL SCN>25 NG/ML: NEGATIVE
PH UR STRIP: >8 [PH] (ref 5–8)
PLATELET # BLD AUTO: 312 K/UL (ref 150–450)
PMV BLD AUTO: 8.5 FL (ref 9.2–12.9)
POTASSIUM SERPL-SCNC: 4 MMOL/L (ref 3.5–5.1)
PROT SERPL-MCNC: 6.2 G/DL (ref 6–8.4)
PROT UR QL STRIP: ABNORMAL
RBC # BLD AUTO: 3.76 M/UL (ref 4–5.4)
RBC #/AREA URNS HPF: 5 /HPF (ref 0–4)
SALICYLATES SERPL-MCNC: <4 MG/DL (ref 15–30)
SARS-COV-2 RDRP RESP QL NAA+PROBE: NEGATIVE
SODIUM SERPL-SCNC: 139 MMOL/L (ref 136–145)
SP GR UR STRIP: 1.01 (ref 1–1.03)
SQUAMOUS #/AREA URNS HPF: 2 /HPF
TOXICOLOGY INFORMATION: NORMAL
TSH SERPL DL<=0.005 MIU/L-ACNC: 2.25 UIU/ML (ref 0.34–5.6)
URN SPEC COLLECT METH UR: ABNORMAL
UROBILINOGEN UR STRIP-ACNC: NEGATIVE EU/DL
WBC # BLD AUTO: 4.7 K/UL (ref 3.9–12.7)
WBC #/AREA URNS HPF: >100 /HPF (ref 0–5)

## 2021-12-22 PROCEDURE — 93005 ELECTROCARDIOGRAM TRACING: CPT | Performed by: INTERNAL MEDICINE

## 2021-12-22 PROCEDURE — 80179 DRUG ASSAY SALICYLATE: CPT | Performed by: EMERGENCY MEDICINE

## 2021-12-22 PROCEDURE — 84443 ASSAY THYROID STIM HORMONE: CPT | Performed by: EMERGENCY MEDICINE

## 2021-12-22 PROCEDURE — G0378 HOSPITAL OBSERVATION PER HR: HCPCS

## 2021-12-22 PROCEDURE — U0002 COVID-19 LAB TEST NON-CDC: HCPCS | Performed by: EMERGENCY MEDICINE

## 2021-12-22 PROCEDURE — 80307 DRUG TEST PRSMV CHEM ANLYZR: CPT | Performed by: EMERGENCY MEDICINE

## 2021-12-22 PROCEDURE — 85025 COMPLETE CBC W/AUTO DIFF WBC: CPT | Performed by: EMERGENCY MEDICINE

## 2021-12-22 PROCEDURE — 93010 ELECTROCARDIOGRAM REPORT: CPT | Mod: ,,, | Performed by: INTERNAL MEDICINE

## 2021-12-22 PROCEDURE — 87186 SC STD MICRODIL/AGAR DIL: CPT | Performed by: EMERGENCY MEDICINE

## 2021-12-22 PROCEDURE — 82140 ASSAY OF AMMONIA: CPT | Performed by: EMERGENCY MEDICINE

## 2021-12-22 PROCEDURE — 82077 ASSAY SPEC XCP UR&BREATH IA: CPT | Performed by: EMERGENCY MEDICINE

## 2021-12-22 PROCEDURE — 81001 URINALYSIS AUTO W/SCOPE: CPT | Performed by: EMERGENCY MEDICINE

## 2021-12-22 PROCEDURE — 25000003 PHARM REV CODE 250: Performed by: STUDENT IN AN ORGANIZED HEALTH CARE EDUCATION/TRAINING PROGRAM

## 2021-12-22 PROCEDURE — 63600175 PHARM REV CODE 636 W HCPCS: Performed by: EMERGENCY MEDICINE

## 2021-12-22 PROCEDURE — 87086 URINE CULTURE/COLONY COUNT: CPT | Performed by: EMERGENCY MEDICINE

## 2021-12-22 PROCEDURE — 80053 COMPREHEN METABOLIC PANEL: CPT | Performed by: EMERGENCY MEDICINE

## 2021-12-22 PROCEDURE — 87077 CULTURE AEROBIC IDENTIFY: CPT | Performed by: EMERGENCY MEDICINE

## 2021-12-22 PROCEDURE — 25000003 PHARM REV CODE 250: Performed by: EMERGENCY MEDICINE

## 2021-12-22 PROCEDURE — 87040 BLOOD CULTURE FOR BACTERIA: CPT | Mod: 59 | Performed by: EMERGENCY MEDICINE

## 2021-12-22 PROCEDURE — 80143 DRUG ASSAY ACETAMINOPHEN: CPT | Performed by: EMERGENCY MEDICINE

## 2021-12-22 PROCEDURE — 96365 THER/PROPH/DIAG IV INF INIT: CPT

## 2021-12-22 PROCEDURE — 93010 EKG 12-LEAD: ICD-10-PCS | Mod: ,,, | Performed by: INTERNAL MEDICINE

## 2021-12-22 PROCEDURE — 99284 EMERGENCY DEPT VISIT MOD MDM: CPT | Mod: 25

## 2021-12-22 PROCEDURE — 83605 ASSAY OF LACTIC ACID: CPT | Performed by: EMERGENCY MEDICINE

## 2021-12-22 RX ORDER — MAGNESIUM SULFATE HEPTAHYDRATE 40 MG/ML
4 INJECTION, SOLUTION INTRAVENOUS
Status: DISCONTINUED | OUTPATIENT
Start: 2021-12-22 | End: 2021-12-27 | Stop reason: HOSPADM

## 2021-12-22 RX ORDER — MECLIZINE HCL 12.5 MG 12.5 MG/1
25 TABLET ORAL 3 TIMES DAILY PRN
Status: DISCONTINUED | OUTPATIENT
Start: 2021-12-22 | End: 2021-12-27 | Stop reason: HOSPADM

## 2021-12-22 RX ORDER — ASPIRIN 81 MG/1
81 TABLET ORAL 2 TIMES DAILY
Status: DISCONTINUED | OUTPATIENT
Start: 2021-12-22 | End: 2021-12-27 | Stop reason: HOSPADM

## 2021-12-22 RX ORDER — MAGNESIUM SULFATE HEPTAHYDRATE 40 MG/ML
2 INJECTION, SOLUTION INTRAVENOUS
Status: DISCONTINUED | OUTPATIENT
Start: 2021-12-22 | End: 2021-12-27 | Stop reason: HOSPADM

## 2021-12-22 RX ORDER — MAG HYDROX/ALUMINUM HYD/SIMETH 200-200-20
30 SUSPENSION, ORAL (FINAL DOSE FORM) ORAL
Status: DISCONTINUED | OUTPATIENT
Start: 2021-12-22 | End: 2021-12-27 | Stop reason: HOSPADM

## 2021-12-22 RX ORDER — POTASSIUM CHLORIDE 7.45 MG/ML
20 INJECTION INTRAVENOUS
Status: DISCONTINUED | OUTPATIENT
Start: 2021-12-22 | End: 2021-12-27 | Stop reason: HOSPADM

## 2021-12-22 RX ORDER — TALC
6 POWDER (GRAM) TOPICAL NIGHTLY PRN
Status: DISCONTINUED | OUTPATIENT
Start: 2021-12-22 | End: 2021-12-27 | Stop reason: HOSPADM

## 2021-12-22 RX ORDER — MAGNESIUM SULFATE 1 G/100ML
1 INJECTION INTRAVENOUS
Status: DISCONTINUED | OUTPATIENT
Start: 2021-12-22 | End: 2021-12-27 | Stop reason: HOSPADM

## 2021-12-22 RX ORDER — SODIUM CHLORIDE 0.9 % (FLUSH) 0.9 %
10 SYRINGE (ML) INJECTION
Status: DISCONTINUED | OUTPATIENT
Start: 2021-12-22 | End: 2021-12-27 | Stop reason: HOSPADM

## 2021-12-22 RX ORDER — LANOLIN ALCOHOL/MO/W.PET/CERES
800 CREAM (GRAM) TOPICAL
Status: DISCONTINUED | OUTPATIENT
Start: 2021-12-22 | End: 2021-12-27 | Stop reason: HOSPADM

## 2021-12-22 RX ORDER — POLYETHYLENE GLYCOL 3350 17 G/17G
17 POWDER, FOR SOLUTION ORAL DAILY
Status: DISCONTINUED | OUTPATIENT
Start: 2021-12-23 | End: 2021-12-27 | Stop reason: HOSPADM

## 2021-12-22 RX ORDER — POTASSIUM CHLORIDE 20 MEQ/1
20 TABLET, EXTENDED RELEASE ORAL
Status: DISCONTINUED | OUTPATIENT
Start: 2021-12-22 | End: 2021-12-27 | Stop reason: HOSPADM

## 2021-12-22 RX ORDER — POTASSIUM CHLORIDE 20 MEQ/1
40 TABLET, EXTENDED RELEASE ORAL
Status: DISCONTINUED | OUTPATIENT
Start: 2021-12-22 | End: 2021-12-27 | Stop reason: HOSPADM

## 2021-12-22 RX ORDER — POTASSIUM CHLORIDE 7.45 MG/ML
40 INJECTION INTRAVENOUS
Status: DISCONTINUED | OUTPATIENT
Start: 2021-12-22 | End: 2021-12-27 | Stop reason: HOSPADM

## 2021-12-22 RX ORDER — DONEPEZIL HYDROCHLORIDE 5 MG/1
5 TABLET, FILM COATED ORAL NIGHTLY
Status: DISCONTINUED | OUTPATIENT
Start: 2021-12-22 | End: 2021-12-27 | Stop reason: HOSPADM

## 2021-12-22 RX ORDER — SIMETHICONE 80 MG
80 TABLET,CHEWABLE ORAL 4 TIMES DAILY PRN
Status: DISCONTINUED | OUTPATIENT
Start: 2021-12-22 | End: 2021-12-27 | Stop reason: HOSPADM

## 2021-12-22 RX ORDER — ESCITALOPRAM OXALATE 5 MG/1
5 TABLET ORAL NIGHTLY
Status: DISCONTINUED | OUTPATIENT
Start: 2021-12-22 | End: 2021-12-27 | Stop reason: HOSPADM

## 2021-12-22 RX ADMIN — CEFTRIAXONE 2 G: 2 INJECTION, SOLUTION INTRAVENOUS at 02:12

## 2021-12-22 RX ADMIN — DONEPEZIL HYDROCHLORIDE 5 MG: 5 TABLET, FILM COATED ORAL at 09:12

## 2021-12-22 RX ADMIN — ESCITALOPRAM 5 MG: 5 TABLET, FILM COATED ORAL at 09:12

## 2021-12-22 RX ADMIN — ASPIRIN 81 MG: 81 TABLET, COATED ORAL at 09:12

## 2021-12-22 RX ADMIN — ALUMINUM HYDROXIDE, MAGNESIUM HYDROXIDE, AND SIMETHICONE 30 ML: 200; 200; 20 SUSPENSION ORAL at 09:12

## 2021-12-22 NOTE — ED PROVIDER NOTES
Encounter Date: 12/22/2021       History     Chief Complaint   Patient presents with    PEC PT     PT WAS AT [facility] AND WAS READY TO GO HOME, PT WAS THEN PEC'D AND SENT TO [facility]      89-year-old female with no PMH sent from local rehab facility for increasing confusion.  The patient was initially seen at [facility] after she sustained a left hip fracture that required pinning on November 22nd of this year.  She was then transferred to a local rehab facility.  During her stay she had increasing confusion.  It was thought that maybe she had underlying dementia and was started on Lexapro and Aricept.  Tele psychiatry was consulted yesterday and it was deemed that the patient did not have capacity to make her own medical decisions.  The patient had been reportedly having episodes of being aggressive and trying to elope from the rehab facility so the physician did place her under at PEC this morning so that she could not leave the facility.  He had planned to do a further workup to rule out any organic causes of her confusion however the charge nurse advised physician that they could not keep the patient at the facility any longer because they cannot he patients under a PEC so the patient was transported here for further evaluation.  Here she thinks that she is in my office.  She tells me that she just wants to go back to work.  She has no complaints.        Review of patient's allergies indicates:  No Known Allergies  Past Medical History:   Diagnosis Date    Dementia     Hip fracture     L     Past Surgical History:   Procedure Laterality Date    PERCUTANEOUS PINNING OF HIP Right 11/22/2021    Procedure: PINNING, HIP, PERCUTANEOUS;  Surgeon: Navneet Rios MD;  Location: [facility];  Service: Orthopedics;  Laterality: Right;     Family History   Problem Relation Age of Onset    Heart disease Neg Hx      Social History     Tobacco Use    Smoking status: Never Smoker    Smokeless tobacco:  Never Used   Substance Use Topics    Alcohol use: No    Drug use: Never     Review of Systems   Unable to perform ROS: Mental status change       Physical Exam     Initial Vitals [12/22/21 0913]   BP Pulse Resp Temp SpO2   136/61 62 20 98 °F (36.7 °C) 98 %      MAP       --         Physical Exam    Nursing note and vitals reviewed.  Constitutional: She appears well-developed and well-nourished. No distress.   HENT:   Head: Normocephalic and atraumatic.   Eyes: EOM are normal.   Neck:   Normal range of motion.  Cardiovascular: Normal rate, regular rhythm, normal heart sounds and intact distal pulses.   Pulmonary/Chest: Breath sounds normal. She has no wheezes. She has no rhonchi. She has no rales.   Abdominal: Abdomen is soft. She exhibits no distension. There is no abdominal tenderness. There is no rebound.   Musculoskeletal:         General: Normal range of motion.      Cervical back: Normal range of motion.      Comments: Well-healed surgical excision to right lateral hip with full range of motion of said hip     Neurological: She is alert. She has normal strength. No sensory deficit. GCS eye subscore is 4. GCS verbal subscore is 5. GCS motor subscore is 6.   Pleasantly confused.  Knows her name but not location date or events   Skin: Skin is warm and dry. Capillary refill takes less than 2 seconds.   Psychiatric: Thought content normal.         ED Course   Procedures  Labs Reviewed   CBC W/ AUTO DIFFERENTIAL - Abnormal; Notable for the following components:       Result Value    RBC 3.76 (*)     Hemoglobin 11.1 (*)     Hematocrit 35.6 (*)     MCHC 31.2 (*)     MPV 8.5 (*)     All other components within normal limits   COMPREHENSIVE METABOLIC PANEL - Abnormal; Notable for the following components:    BUN 24 (*)     Albumin 3.2 (*)     All other components within normal limits   SALICYLATE LEVEL - Abnormal; Notable for the following components:    Salicylate Lvl <4.0 (*)     All other components within normal  limits   URINALYSIS, REFLEX TO URINE CULTURE - Abnormal; Notable for the following components:    Appearance, UA Cloudy (*)     pH, UA >8.0 (*)     Protein, UA Trace (*)     Nitrite, UA Positive (*)     Leukocytes, UA 3+ (*)     All other components within normal limits    Narrative:     Specimen Source->Urine   URINALYSIS MICROSCOPIC - Abnormal; Notable for the following components:    RBC, UA 5 (*)     WBC, UA >100 (*)     Hyaline Casts, UA 15 (*)     All other components within normal limits    Narrative:     Specimen Source->Urine   CULTURE, URINE   CULTURE, BLOOD   CULTURE, BLOOD   TSH   DRUG SCREEN PANEL, URINE EMERGENCY    Narrative:     Specimen Source->Urine   ALCOHOL,MEDICAL (ETHANOL)   ACETAMINOPHEN LEVEL   SARS-COV-2 RNA AMPLIFICATION, QUAL   AMMONIA   LACTIC ACID, PLASMA          Imaging Results          CT Head Without Contrast (Final result)  Result time 12/22/21 10:17:07    Final result by Enrique Godoy MD (12/22/21 10:17:07)                 Narrative:    CMS MANDATED QUALITY DATA - CT RADIATION  436    All CT scans at this facility utilize dose modulation, iterative reconstruction, and/or weight based dosing when appropriate to reduce radiation dose to as low as reasonably achievable.    CT HEAD WITHOUT IV CONTRAST    CLINICAL HISTORY:  89 years Female Mental status change, unknown cause    COMPARISON: CT head December 8, 2021    FINDINGS: Negative for acute intracranial hemorrhage, midline shift, or mass effect. Cerebral atrophy with associated ventricular and sulcal enlargement, stable compared to prior. Periventricular white matter hypoattenuation consistent with microangiopathic change. Cerebellar hemispheres and brainstem are unremarkable. Atherosclerotic calcification of the intracranial carotid arteries.    Radiolucent lesion involving the occipital calvarium is stable.    IMPRESSION:    No CT evidence of acute intracranial pathology.    Mild white matter microangiopathic  changes.    Electronically signed by:  Enrique Godoy MD  12/22/2021 10:17 AM [facility] Workstation: [id number]                               Medications   EScitalopram oxalate tablet 5 mg (has no administration in time range)   donepeziL tablet 5 mg (has no administration in time range)   cefTRIAXone (ROCEPHIN) 2 g/50 mL D5W IVPB (0 g Intravenous Stopped 12/22/21 0548)     Medical Decision Making:   ED Management:  89-year-old female presents emergency department with altered mental status and under a pec as she was trying to elope from the rehab facility where she was residing.  Here she is pleasantly confused.  She does not have a fever.  Hemodynamically she is stable.  She does have a significant urinary tract infection.  As such I cannot rule out her UTI causing her confusion and will admit for IV antibiotics and further evaluation of her encephalopathy.    Anaid Abdi MD  Emergency Medicine  12/22/2021 4:09 PM           Clinical Impression:   Final diagnoses:  [N39.0] Urinary tract infection without hematuria, site unspecified (Primary)  [G93.41] Metabolic encephalopathy  [R41.0] Confusion          ED Disposition Condition    Admit               Anaid Abdi MD  12/22/21 7940

## 2021-12-23 LAB
ALBUMIN SERPL BCP-MCNC: 3.1 G/DL (ref 3.5–5.2)
ALP SERPL-CCNC: 84 U/L (ref 55–135)
ALT SERPL W/O P-5'-P-CCNC: 22 U/L (ref 10–44)
ANION GAP SERPL CALC-SCNC: 9 MMOL/L (ref 8–16)
AST SERPL-CCNC: 21 U/L (ref 10–40)
BASOPHILS # BLD AUTO: 0.04 K/UL (ref 0–0.2)
BASOPHILS NFR BLD: 0.7 % (ref 0–1.9)
BILIRUB SERPL-MCNC: 0.6 MG/DL (ref 0.1–1)
BUN SERPL-MCNC: 25 MG/DL (ref 8–23)
CALCIUM SERPL-MCNC: 8.7 MG/DL (ref 8.7–10.5)
CHLORIDE SERPL-SCNC: 100 MMOL/L (ref 95–110)
CO2 SERPL-SCNC: 30 MMOL/L (ref 23–29)
CREAT SERPL-MCNC: 0.6 MG/DL (ref 0.5–1.4)
DIFFERENTIAL METHOD: ABNORMAL
EOSINOPHIL # BLD AUTO: 0.3 K/UL (ref 0–0.5)
EOSINOPHIL NFR BLD: 4.6 % (ref 0–8)
ERYTHROCYTE [DISTWIDTH] IN BLOOD BY AUTOMATED COUNT: 14.6 % (ref 11.5–14.5)
EST. GFR  (AFRICAN AMERICAN): >60 ML/MIN/1.73 M^2
EST. GFR  (NON AFRICAN AMERICAN): >60 ML/MIN/1.73 M^2
FOLATE SERPL-MCNC: 13.7 NG/ML (ref 4–24)
GLUCOSE SERPL-MCNC: 93 MG/DL (ref 70–110)
HCT VFR BLD AUTO: 32.6 % (ref 37–48.5)
HGB BLD-MCNC: 10.3 G/DL (ref 12–16)
IMM GRANULOCYTES # BLD AUTO: 0.02 K/UL (ref 0–0.04)
IMM GRANULOCYTES NFR BLD AUTO: 0.4 % (ref 0–0.5)
LYMPHOCYTES # BLD AUTO: 1.5 K/UL (ref 1–4.8)
LYMPHOCYTES NFR BLD: 28.3 % (ref 18–48)
MCH RBC QN AUTO: 30 PG (ref 27–31)
MCHC RBC AUTO-ENTMCNC: 31.6 G/DL (ref 32–36)
MCV RBC AUTO: 95 FL (ref 82–98)
MONOCYTES # BLD AUTO: 0.5 K/UL (ref 0.3–1)
MONOCYTES NFR BLD: 9 % (ref 4–15)
NEUTROPHILS # BLD AUTO: 3.1 K/UL (ref 1.8–7.7)
NEUTROPHILS NFR BLD: 57 % (ref 38–73)
NRBC BLD-RTO: 0 /100 WBC
PLATELET # BLD AUTO: 296 K/UL (ref 150–450)
PMV BLD AUTO: 8.6 FL (ref 9.2–12.9)
POTASSIUM SERPL-SCNC: 4.5 MMOL/L (ref 3.5–5.1)
PROT SERPL-MCNC: 6.2 G/DL (ref 6–8.4)
RBC # BLD AUTO: 3.43 M/UL (ref 4–5.4)
SODIUM SERPL-SCNC: 139 MMOL/L (ref 136–145)
VIT B12 SERPL-MCNC: 354 PG/ML (ref 210–950)
WBC # BLD AUTO: 5.44 K/UL (ref 3.9–12.7)

## 2021-12-23 PROCEDURE — 97165 OT EVAL LOW COMPLEX 30 MIN: CPT

## 2021-12-23 PROCEDURE — 96375 TX/PRO/DX INJ NEW DRUG ADDON: CPT

## 2021-12-23 PROCEDURE — 36415 COLL VENOUS BLD VENIPUNCTURE: CPT | Performed by: STUDENT IN AN ORGANIZED HEALTH CARE EDUCATION/TRAINING PROGRAM

## 2021-12-23 PROCEDURE — 63600175 PHARM REV CODE 636 W HCPCS: Performed by: INTERNAL MEDICINE

## 2021-12-23 PROCEDURE — 97535 SELF CARE MNGMENT TRAINING: CPT

## 2021-12-23 PROCEDURE — 80053 COMPREHEN METABOLIC PANEL: CPT | Performed by: STUDENT IN AN ORGANIZED HEALTH CARE EDUCATION/TRAINING PROGRAM

## 2021-12-23 PROCEDURE — 85025 COMPLETE CBC W/AUTO DIFF WBC: CPT | Performed by: STUDENT IN AN ORGANIZED HEALTH CARE EDUCATION/TRAINING PROGRAM

## 2021-12-23 PROCEDURE — 82607 VITAMIN B-12: CPT | Performed by: STUDENT IN AN ORGANIZED HEALTH CARE EDUCATION/TRAINING PROGRAM

## 2021-12-23 PROCEDURE — 97162 PT EVAL MOD COMPLEX 30 MIN: CPT

## 2021-12-23 PROCEDURE — 97116 GAIT TRAINING THERAPY: CPT

## 2021-12-23 PROCEDURE — 94799 UNLISTED PULMONARY SVC/PX: CPT

## 2021-12-23 PROCEDURE — 99900035 HC TECH TIME PER 15 MIN (STAT)

## 2021-12-23 PROCEDURE — 25000003 PHARM REV CODE 250: Performed by: STUDENT IN AN ORGANIZED HEALTH CARE EDUCATION/TRAINING PROGRAM

## 2021-12-23 PROCEDURE — 12000002 HC ACUTE/MED SURGE SEMI-PRIVATE ROOM

## 2021-12-23 PROCEDURE — 25000003 PHARM REV CODE 250: Performed by: EMERGENCY MEDICINE

## 2021-12-23 PROCEDURE — 96376 TX/PRO/DX INJ SAME DRUG ADON: CPT

## 2021-12-23 PROCEDURE — 63600175 PHARM REV CODE 636 W HCPCS: Performed by: STUDENT IN AN ORGANIZED HEALTH CARE EDUCATION/TRAINING PROGRAM

## 2021-12-23 PROCEDURE — 99900031 HC PATIENT EDUCATION (STAT)

## 2021-12-23 PROCEDURE — 82746 ASSAY OF FOLIC ACID SERUM: CPT | Performed by: STUDENT IN AN ORGANIZED HEALTH CARE EDUCATION/TRAINING PROGRAM

## 2021-12-23 PROCEDURE — 94761 N-INVAS EAR/PLS OXIMETRY MLT: CPT

## 2021-12-23 PROCEDURE — 25000003 PHARM REV CODE 250: Performed by: INTERNAL MEDICINE

## 2021-12-23 RX ORDER — SODIUM,POTASSIUM PHOSPHATES 280-250MG
2 POWDER IN PACKET (EA) ORAL
Status: DISCONTINUED | OUTPATIENT
Start: 2021-12-23 | End: 2021-12-27 | Stop reason: HOSPADM

## 2021-12-23 RX ORDER — IBUPROFEN 200 MG
24 TABLET ORAL
Status: DISCONTINUED | OUTPATIENT
Start: 2021-12-23 | End: 2021-12-27 | Stop reason: HOSPADM

## 2021-12-23 RX ORDER — IPRATROPIUM BROMIDE AND ALBUTEROL SULFATE 2.5; .5 MG/3ML; MG/3ML
3 SOLUTION RESPIRATORY (INHALATION) EVERY 6 HOURS PRN
Status: DISCONTINUED | OUTPATIENT
Start: 2021-12-23 | End: 2021-12-27 | Stop reason: HOSPADM

## 2021-12-23 RX ORDER — AMOXICILLIN 250 MG
1 CAPSULE ORAL DAILY
Status: DISCONTINUED | OUTPATIENT
Start: 2021-12-23 | End: 2021-12-27 | Stop reason: HOSPADM

## 2021-12-23 RX ORDER — ACETAMINOPHEN 500 MG
1000 TABLET ORAL EVERY 8 HOURS PRN
Status: DISCONTINUED | OUTPATIENT
Start: 2021-12-23 | End: 2021-12-27 | Stop reason: HOSPADM

## 2021-12-23 RX ORDER — PROCHLORPERAZINE EDISYLATE 5 MG/ML
5 INJECTION INTRAMUSCULAR; INTRAVENOUS EVERY 6 HOURS PRN
Status: DISCONTINUED | OUTPATIENT
Start: 2021-12-23 | End: 2021-12-27 | Stop reason: HOSPADM

## 2021-12-23 RX ORDER — MAG HYDROX/ALUMINUM HYD/SIMETH 200-200-20
30 SUSPENSION, ORAL (FINAL DOSE FORM) ORAL 4 TIMES DAILY PRN
Status: DISCONTINUED | OUTPATIENT
Start: 2021-12-23 | End: 2021-12-27 | Stop reason: HOSPADM

## 2021-12-23 RX ORDER — FERROUS SULFATE, DRIED 160(50) MG
2 TABLET, EXTENDED RELEASE ORAL 2 TIMES DAILY
Status: DISCONTINUED | OUTPATIENT
Start: 2021-12-23 | End: 2021-12-27 | Stop reason: HOSPADM

## 2021-12-23 RX ORDER — POLYETHYLENE GLYCOL 3350 17 G/17G
17 POWDER, FOR SOLUTION ORAL DAILY
Status: DISCONTINUED | OUTPATIENT
Start: 2021-12-23 | End: 2021-12-23 | Stop reason: SDUPTHER

## 2021-12-23 RX ORDER — SODIUM CHLORIDE 0.9 % (FLUSH) 0.9 %
10 SYRINGE (ML) INJECTION EVERY 8 HOURS PRN
Status: DISCONTINUED | OUTPATIENT
Start: 2021-12-23 | End: 2021-12-27 | Stop reason: HOSPADM

## 2021-12-23 RX ORDER — NAPROXEN SODIUM 220 MG/1
81 TABLET, FILM COATED ORAL 2 TIMES DAILY
Status: DISCONTINUED | OUTPATIENT
Start: 2021-12-23 | End: 2021-12-23 | Stop reason: SDUPTHER

## 2021-12-23 RX ORDER — HALOPERIDOL 5 MG/ML
2 INJECTION INTRAMUSCULAR EVERY 6 HOURS PRN
Status: DISCONTINUED | OUTPATIENT
Start: 2021-12-23 | End: 2021-12-27 | Stop reason: HOSPADM

## 2021-12-23 RX ORDER — QUETIAPINE FUMARATE 100 MG/1
100 TABLET, FILM COATED ORAL NIGHTLY
Status: DISCONTINUED | OUTPATIENT
Start: 2021-12-23 | End: 2021-12-27 | Stop reason: HOSPADM

## 2021-12-23 RX ORDER — GLUCAGON 1 MG
1 KIT INJECTION
Status: DISCONTINUED | OUTPATIENT
Start: 2021-12-23 | End: 2021-12-27 | Stop reason: HOSPADM

## 2021-12-23 RX ORDER — IBUPROFEN 200 MG
16 TABLET ORAL
Status: DISCONTINUED | OUTPATIENT
Start: 2021-12-23 | End: 2021-12-27 | Stop reason: HOSPADM

## 2021-12-23 RX ORDER — SIMETHICONE 80 MG
1 TABLET,CHEWABLE ORAL 4 TIMES DAILY PRN
Status: DISCONTINUED | OUTPATIENT
Start: 2021-12-23 | End: 2021-12-23 | Stop reason: SDUPTHER

## 2021-12-23 RX ORDER — LANOLIN ALCOHOL/MO/W.PET/CERES
1 CREAM (GRAM) TOPICAL
Status: DISCONTINUED | OUTPATIENT
Start: 2021-12-23 | End: 2021-12-27 | Stop reason: HOSPADM

## 2021-12-23 RX ORDER — ONDANSETRON 4 MG/1
8 TABLET, ORALLY DISINTEGRATING ORAL EVERY 8 HOURS PRN
Status: DISCONTINUED | OUTPATIENT
Start: 2021-12-23 | End: 2021-12-27 | Stop reason: HOSPADM

## 2021-12-23 RX ORDER — TALC
6 POWDER (GRAM) TOPICAL NIGHTLY PRN
Status: DISCONTINUED | OUTPATIENT
Start: 2021-12-23 | End: 2021-12-23 | Stop reason: SDUPTHER

## 2021-12-23 RX ADMIN — ALUMINUM HYDROXIDE, MAGNESIUM HYDROXIDE, AND SIMETHICONE 30 ML: 200; 200; 20 SUSPENSION ORAL at 05:12

## 2021-12-23 RX ADMIN — ASPIRIN 81 MG: 81 TABLET, COATED ORAL at 09:12

## 2021-12-23 RX ADMIN — QUETIAPINE 100 MG: 100 TABLET ORAL at 04:12

## 2021-12-23 RX ADMIN — SENNOSIDES AND DOCUSATE SODIUM 1 TABLET: 8.6; 5 TABLET ORAL at 08:12

## 2021-12-23 RX ADMIN — HALOPERIDOL LACTATE 2 MG: 5 INJECTION, SOLUTION INTRAMUSCULAR at 02:12

## 2021-12-23 RX ADMIN — CALCIUM CARBONATE-VITAMIN D TAB 500 MG-200 UNIT 2 TABLET: 500-200 TAB at 08:12

## 2021-12-23 RX ADMIN — ALUMINUM HYDROXIDE, MAGNESIUM HYDROXIDE, AND SIMETHICONE 30 ML: 200; 200; 20 SUSPENSION ORAL at 10:12

## 2021-12-23 RX ADMIN — ASPIRIN 81 MG: 81 TABLET, COATED ORAL at 08:12

## 2021-12-23 RX ADMIN — CEFTRIAXONE SODIUM 1 G: 1 INJECTION, POWDER, FOR SOLUTION INTRAMUSCULAR; INTRAVENOUS at 10:12

## 2021-12-23 RX ADMIN — FERROUS SULFATE TAB 325 MG (65 MG ELEMENTAL FE) 1 EACH: 325 (65 FE) TAB at 08:12

## 2021-12-23 RX ADMIN — ACETAMINOPHEN 1000 MG: 500 TABLET, FILM COATED ORAL at 04:12

## 2021-12-23 RX ADMIN — ALUMINUM HYDROXIDE, MAGNESIUM HYDROXIDE, AND SIMETHICONE 30 ML: 200; 200; 20 SUSPENSION ORAL at 09:12

## 2021-12-23 RX ADMIN — POLYETHYLENE GLYCOL 3350 17 G: 17 POWDER, FOR SOLUTION ORAL at 08:12

## 2021-12-23 NOTE — PLAN OF CARE
Goals to be met by: discharge     Patient will increase functional independence with ADLs by performing:    UE Dressing with Contact Guard Assistance.  LE Dressing with Contact Guard Assistance.  Grooming while standing at sink with Contact Guard Assistance.  Toileting from toilet with Contact Guard Assistance for hygiene and clothing management.   Toilet transfer to toilet with Contact Guard Assistance.

## 2021-12-23 NOTE — PT/OT/SLP EVAL
Physical Therapy Evaluation    Patient Name:  Keila Romano   MRN:  [id number]    Recommendations:     Discharge Recommendations:  nursing facility, skilled   Discharge Equipment Recommendations: rollator   Barriers to discharge: Decreased caregiver support    Assessment:     Keila Romano is a 89 y.o. female admitted with a medical diagnosis of Acute cystitis.  She presents with the following impairments/functional limitations:  weakness,impaired endurance,impaired functional mobilty,gait instability,impaired balance,decreased lower extremity function .      Pt presented in supine in NAD. She was confused, did not know  reason for this admit, could not recall which hip was fractured and received surgery on 11/22/2021. Pt was pleasant and cooperative but has impaired insight.   Rehab Prognosis: Good; patient would benefit from acute skilled PT services to address these deficits and reach maximum level of function.    Recent Surgery: * No surgery found *      Plan:     During this hospitalization, patient to be seen 5 x/week to address the identified rehab impairments via gait training,therapeutic activities,therapeutic exercises and progress toward the following goals:    · Plan of Care Expires:  01/23/22    Subjective     Chief Complaint: none   Patient/Family Comments/goals:Go home to her cats  Pain/Comfort:  ·      Patients cultural, spiritual, Yazidism conflicts given the current situation:      Living Environment:  Pt reported living alone at home in a one story house  Prior to admission, patients level of function was independent at baseline before fall resulting in L hip surgery , Rehab..  Equipment used at home:RW  .  DME owned (not currently used): none.  Upon discharge, patient will have assistance from facility.    Objective:     Communicated with nurse prior to session.  Patient found supine with bed alarm,peripheral IV  upon PT entry to room.    General Precautions: Standard, fall , PEC 'ed    Orthopedic Precautions:    Braces:    Respiratory Status: Room air    Exams:  · Cognitive Exam:  Patient is oriented to Person and Time  · RLE ROM: WFL  · RLE Strength: WFL  · LLE ROM: WFL  · LLE Strength: WFL    Functional Mobility:  · Bed Mobility:     · Supine to Sit: contact guard assistance  · Sit to Supine: contact guard assistance  · Transfers:     · Sit to Stand:  contact guard assistance with rolling walker  · Gait: 80 ft RW and CGA    Therapeutic Activities and Exercises:  Pt was educated on the role of PT  for assessment, treatment with emphasis on safety and increased mobility and D/C  recommendations.    AM-PAC 6 CLICK MOBILITY  Total Score:      Patient left supine with all lines intact, call button in reach, bed alarm on and sitter present.    GOALS:   Multidisciplinary Problems     Physical Therapy Goals        Problem: Physical Therapy Goal    Goal Priority Disciplines Outcome Goal Variances Interventions   Physical Therapy Goal     PT, PT/OT      Description: Goals to be met by: D/C    Patient will increase functional independence with mobility by performin. Supine to sit with Stand-by Assistance  2. Sit to supine with Stand-by Assistance  3. Sit to stand transfer with Stand-by Assistance  4. Gait  x 200 feet with Stand-by Assistance using Rolling Walker.                      History:     Past Medical History:   Diagnosis Date    Dementia     Hip fracture     L       Past Surgical History:   Procedure Laterality Date    PERCUTANEOUS PINNING OF HIP Right 2021    Procedure: PINNING, HIP, PERCUTANEOUS;  Surgeon: Navneet Rios MD;  Location: [facility];  Service: Orthopedics;  Laterality: Right;       Time Tracking:     PT Received On: 21  PT Start Time: 1026     PT Stop Time: 1044  PT Total Time (min): 18 min     Billable Minutes: Evaluation 10 minutes and Gait Training 8 minutes      2021

## 2021-12-23 NOTE — CARE UPDATE
called [facility] [phone] to inquire about if CEC will be via telephone visit or if MD will visit the patient at the hospital. PEC expires on 12/25 at 0605. Dr Gill is out of office on 12/24/21-1/6/22.

## 2021-12-23 NOTE — NURSING
"Pt noted to be increasingly agitated, insisting that she has to leave this place because "she has been working here for 75 years". Redirecting the patient is unsuccessful, pt is attempting to get physical with staff while attempts to redirect patient are made. MD made aware via secure chat.  "

## 2021-12-23 NOTE — NURSING
Pt arrived to unit via wheelchair. VS stable. No acute issues noted. Sitter in room. Bed in lowest position. Call bell in reach.

## 2021-12-23 NOTE — HPI
89-year-old woman with hip fracture status post repair in November 2 was discharged to skilled nursing, but has altered mental status versus worsening baseline dementia for which the patient was started on Lexapro and Aricept.  The workup for underlying dementia today revealed pyuria consistent with urinary tract infection.    The patient was initially seen at [facility] after she sustained a left hip fracture that required operative repair on November 22nd of this year.  She was then transferred to a local rehab facility.  During her stay she had increasing confusion.  It was thought that maybe she had underlying dementia and was started on Lexapro and Aricept.  Tele psychiatry was consulted yesterday and it was deemed that the patient did not have capacity to make her own medical decisions.  The patient had been reportedly having episodes of being aggressive and trying to elope from the rehab facility so the physician did place her under at PEC this morning so that she could not leave the facility.     Unable to gather reliable review of systems due to mental status and dementia.    Per discussion with the patient's friends, who are at bedside, the patient has not been working well with physical therapy after her surgery due to her mental status.  They are concerned about her physical debility.

## 2021-12-23 NOTE — SUBJECTIVE & OBJECTIVE
Past Medical History:   Diagnosis Date    Dementia     Hip fracture     L       Past Surgical History:   Procedure Laterality Date    PERCUTANEOUS PINNING OF HIP Right 11/22/2021    Procedure: PINNING, HIP, PERCUTANEOUS;  Surgeon: Navneet Rios MD;  Location: [facility];  Service: Orthopedics;  Laterality: Right;       Review of patient's allergies indicates:  No Known Allergies    Current Facility-Administered Medications   Medication    acetaminophen tablet 1,000 mg    albuterol-ipratropium 2.5 mg-0.5 mg/3 mL nebulizer solution 3 mL    aluminum-magnesium hydroxide-simethicone 200-200-20 mg/5 mL suspension 30 mL    aluminum-magnesium hydroxide-simethicone 200-200-20 mg/5 mL suspension 30 mL    aspirin EC tablet 81 mg    calcium chloride 1 g in dextrose 5 % 100 mL IVPB    calcium chloride 1 g in dextrose 5 % 100 mL IVPB    calcium chloride 1 g in dextrose 5 % 100 mL IVPB    calcium-vitamin D3 500 mg-5 mcg (200 unit) per tablet 2 tablet    cefTRIAXone (ROCEPHIN) 1 g/50 mL D5W IVPB    dextrose 50% injection 12.5 g    dextrose 50% injection 25 g    donepeziL tablet 5 mg    EScitalopram oxalate tablet 5 mg    ferrous sulfate tablet 1 each    glucagon (human recombinant) injection 1 mg    glucose chewable tablet 16 g    glucose chewable tablet 24 g    ibuprofen tablet 600 mg    magnesium oxide tablet 800 mg    magnesium sulfate 2g in water 50mL IVPB (premix)    magnesium sulfate 2g in water 50mL IVPB (premix)    magnesium sulfate 2g in water 50mL IVPB (premix)    magnesium sulfate in dextrose IVPB (premix) 1 g    meclizine tablet 25 mg    melatonin tablet 6 mg    ondansetron disintegrating tablet 8 mg    polyethylene glycol packet 17 g    potassium bicarbonate disintegrating tablet 35 mEq    potassium bicarbonate disintegrating tablet 50 mEq    potassium bicarbonate disintegrating tablet 60 mEq    potassium chloride 10 mEq in 100 mL IVPB    potassium chloride 10 mEq in 100 mL IVPB     potassium chloride 10 mEq in 100 mL IVPB    potassium chloride 10 mEq in 100 mL IVPB    potassium chloride SA CR tablet 20 mEq    potassium chloride SA CR tablet 20 mEq    potassium chloride SA CR tablet 40 mEq    potassium chloride SA CR tablet 40 mEq    potassium, sodium phosphates 280-160-250 mg packet 2 packet    potassium, sodium phosphates 280-160-250 mg packet 2 packet    potassium, sodium phosphates 280-160-250 mg packet 2 packet    prochlorperazine injection Soln 5 mg    senna-docusate 8.6-50 mg per tablet 1 tablet    simethicone chewable tablet 80 mg    sodium chloride 0.9% flush 10 mL    sodium chloride 0.9% flush 10 mL     Facility-Administered Medications Ordered in Other Encounters   Medication    [MAR Hold - Suspended Admission] acetaminophen tablet 650 mg    [MAR Hold - Suspended Admission] acetaminophen tablet 650 mg    [MAR Hold - Suspended Admission] calcium carbonate 200 mg calcium (500 mg) chewable tablet 500 mg    [MAR Hold - Suspended Admission] calcium-vitamin D3 500 mg(1,250mg) -200 unit per tablet 1 tablet    [MAR Hold - Suspended Admission] donepeziL tablet 5 mg    [MAR Hold - Suspended Admission] enoxaparin injection 40 mg    [MAR Hold - Suspended Admission] EScitalopram oxalate tablet 5 mg    [MAR Hold - Suspended Admission] ferrous gluconate tablet 324 mg    [MAR Hold - Suspended Admission] meclizine tablet 25 mg    [MAR Hold - Suspended Admission] melatonin tablet 6 mg    [MAR Hold - Suspended Admission] multivitamin tablet    [MAR Hold - Suspended Admission] ondansetron disintegrating tablet 4 mg    [MAR Hold - Suspended Admission] polyethylene glycol packet 17 g    [MAR Hold - Suspended Admission] simethicone chewable tablet 80 mg     Family History    None       Tobacco Use    Smoking status: Never Smoker    Smokeless tobacco: Never Used   Substance and Sexual Activity    Alcohol use: No    Drug use: Never    Sexual activity: Not on file     Review of  "Systems   Unable to perform ROS: dementia     Objective:     Vital Signs (Most Recent):  Temp: 98.7 °F (37.1 °C) (12/23/21 0728)  Pulse: 64 (12/23/21 0728)  Resp: 16 (12/23/21 0728)  BP: 125/63 (12/23/21 0728)  SpO2: 96 % (12/23/21 0728) Vital Signs (24h Range):  Temp:  [97.5 °F (36.4 °C)-98.7 °F (37.1 °C)] 98.7 °F (37.1 °C)  Pulse:  [53-94] 64  Resp:  [16-18] 16  SpO2:  [96 %-98 %] 96 %  BP: ()/(62-78) 125/63     Weight: 43.2 kg (95 lb 3.8 oz)  Height: 5' 2" (157.5 cm)  Body mass index is 17.42 kg/m².    No intake or output data in the 24 hours ending 12/23/21 0926    General    Nursing note and vitals reviewed.  Constitutional:   Anorexic.   Pulmonary/Chest: Effort normal.   Neurological: She is alert.   Very pleasant but confused   Psychiatric: She has a normal mood and affect. Her behavior is normal.             Right Hip Exam     Comments:  RLE DNVI. Incision is very well healed. Mild TTP.        Significant Labs:   CBC:   Recent Labs   Lab 12/22/21  0944 12/23/21  0541   WBC 4.70 5.44   HGB 11.1* 10.3*   HCT 35.6* 32.6*    296     CMP:   Recent Labs   Lab 12/22/21 0944 12/23/21  0541    139   K 4.0 4.5    100   CO2 29 30*    93   BUN 24* 25*   CREATININE 0.7 0.6   CALCIUM 8.9 8.7   PROT 6.2 6.2   ALBUMIN 3.2* 3.1*   BILITOT 0.7 0.6   ALKPHOS 86 84   AST 24 21   ALT 22 22   ANIONGAP 10 9   EGFRNONAA >60.0 >60.0     All pertinent labs within the past 24 hours have been reviewed.    Significant Imaging: None  "

## 2021-12-23 NOTE — H&P
[facility] - Emergency Dept  Hospital Medicine  History & Physical    Patient Name: Keila Romano  MRN: [id number]  Patient Class: Emergency  Admission Date: 12/22/2021  Attending Physician: Anaid Abdi MD   Primary Care Provider: Primary Doctor No         Patient information was obtained from patient, caregiver / friend, past medical records and ER records.     Subjective:     Principal Problem:Acute cystitis    Chief Complaint:   Chief Complaint   Patient presents with    [facility] PT     PT WAS AT [facility] AND WAS READY TO GO HOME, PT WAS THEN PEC'D AND SENT TO [facility]         HPI: 89-year-old woman with hip fracture status post repair in November 2 was discharged to skilled nursing, but has altered mental status versus worsening baseline dementia for which the patient was started on Lexapro and Aricept.  The workup for underlying dementia today revealed pyuria consistent with urinary tract infection.    The patient was initially seen at [facility] after she sustained a left hip fracture that required operative repair on November 22nd of this year.  She was then transferred to a local rehab facility.  During her stay she had increasing confusion.  It was thought that maybe she had underlying dementia and was started on Lexapro and Aricept.  Tele psychiatry was consulted yesterday and it was deemed that the patient did not have capacity to make her own medical decisions.  The patient had been reportedly having episodes of being aggressive and trying to elope from the rehab facility so the physician did place her under at [facility] this morning so that she could not leave the facility.     Unable to gather reliable review of systems due to mental status and dementia.    Per discussion with the patient's friends, who are at bedside, the patient has not been working well with physical therapy after her surgery due to her mental status.  They are concerned about her physical  debility.      Past Medical History:   Diagnosis Date    Dementia     Hip fracture     L       Past Surgical History:   Procedure Laterality Date    PERCUTANEOUS PINNING OF HIP Right 11/22/2021    Procedure: PINNING, HIP, PERCUTANEOUS;  Surgeon: Navneet Rios MD;  [facility];  Service: Orthopedics;  Laterality: Right;       Review of patient's allergies indicates:  No Known Allergies    Current Facility-Administered Medications on File Prior to Encounter   Medication    [MAR Hold - Suspended Admission] acetaminophen tablet 650 mg    [MAR Hold - Suspended Admission] acetaminophen tablet 650 mg    [MAR Hold - Suspended Admission] calcium carbonate 200 mg calcium (500 mg) chewable tablet 500 mg    [MAR Hold - Suspended Admission] calcium-vitamin D3 500 mg(1,250mg) -200 unit per tablet 1 tablet    [MAR Hold - Suspended Admission] donepeziL tablet 5 mg    [MAR Hold - Suspended Admission] enoxaparin injection 40 mg    [MAR Hold - Suspended Admission] EScitalopram oxalate tablet 5 mg    [MAR Hold - Suspended Admission] ferrous gluconate tablet 324 mg    [MAR Hold - Suspended Admission] meclizine tablet 25 mg    [MAR Hold - Suspended Admission] melatonin tablet 6 mg    [MAR Hold - Suspended Admission] multivitamin tablet    [MAR Hold - Suspended Admission] ondansetron disintegrating tablet 4 mg    [MAR Hold - Suspended Admission] polyethylene glycol packet 17 g    [MAR Hold - Suspended Admission] simethicone chewable tablet 80 mg     Current Outpatient Medications on File Prior to Encounter   Medication Sig    acetaminophen (TYLENOL) 500 MG tablet Take 2 tablets (1,000 mg total) by mouth every 8 (eight) hours as needed.    aspirin (ECOTRIN) 81 MG EC tablet Take 1 tablet (81 mg total) by mouth 2 (two) times a day.    calcium-vitamin D3 (OS-EMELY 500 + D3) 500 mg(1,250mg) -200 unit per tablet Take 2 tablets by mouth 2 (two) times daily.    ferrous gluconate (FERGON) 324 MG tablet Take 1 tablet (324  mg total) by mouth every 48 hours.    ibuprofen (ADVIL,MOTRIN) 600 MG tablet Take 1 tablet (600 mg total) by mouth every 6 (six) hours as needed (pain, alternate with tylenol).    meclizine (ANTIVERT) 25 mg tablet Take 1 tablet (25 mg total) by mouth 3 (three) times daily as needed for Dizziness.    polyethylene glycol (GLYCOLAX) 17 gram PwPk Take 17 g by mouth once daily.    senna-docusate 8.6-50 mg (PERICOLACE) 8.6-50 mg per tablet Take 1 tablet by mouth once daily.    simethicone (MYLICON) 80 MG chewable tablet Take 1 tablet (80 mg total) by mouth 4 (four) times daily as needed for Flatulence.     Family History    None       Tobacco Use    Smoking status: Never Smoker    Smokeless tobacco: Never Used   Substance and Sexual Activity    Alcohol use: No    Drug use: Never    Sexual activity: Not on file     Review of Systems   Unable to perform ROS: Mental status change     Objective:     Vital Signs (Most Recent):  Temp: 98 °F (36.7 °C) (12/22/21 0913)  Pulse: 62 (12/22/21 0913)  Resp: 20 (12/22/21 0913)  BP: 136/61 (12/22/21 0913)  SpO2: 98 % (12/22/21 0913) Vital Signs (24h Range):  Temp:  [98 °F (36.7 °C)-98.4 °F (36.9 °C)] 98 °F (36.7 °C)  Pulse:  [62-75] 62  Resp:  [16-20] 20  SpO2:  [94 %-98 %] 98 %  BP: (114-136)/(54-61) 136/61     Weight: 40.8 kg (90 lb)  Body mass index is 15.69 kg/m².    Physical Exam  Vitals reviewed.   Constitutional:       General: She is not in acute distress.     Appearance: She is not ill-appearing.   HENT:      Mouth/Throat:      Mouth: Mucous membranes are moist.   Eyes:      Pupils: Pupils are equal, round, and reactive to light.   Cardiovascular:      Rate and Rhythm: Normal rate and regular rhythm.      Pulses: Normal pulses.   Pulmonary:      Effort: Pulmonary effort is normal. No respiratory distress.      Breath sounds: No wheezing.   Abdominal:      General: Abdomen is flat. There is no distension.   Musculoskeletal:         General: Normal range of motion.       Cervical back: Normal range of motion. No rigidity.   Skin:     General: Skin is warm.      Capillary Refill: Capillary refill takes less than 2 seconds.   Neurological:      General: No focal deficit present.      Mental Status: She is alert.      Comments: Moving all 4 extremities.  Pleasant conversant, but not oriented to place, time, or situation.  Social affect intact.           CRANIAL NERVES     CN III, IV, VI   Pupils are equal, round, and reactive to light.       Significant Labs: All pertinent labs within the past 24 hours have been reviewed.    Significant Imaging: I have reviewed all pertinent imaging results/findings within the past 24 hours.    Assessment/Plan:     Active Hospital Problems    Diagnosis    *Acute cystitis    Altered mental status    Vitamin B12 deficiency    Dementia with behavioral disturbance     Observe for urinary tract infection and altered mental status    Significant pyuria on UA  Continue Rocephin  Follow cultures  Repeat B12 and folate levels  RPR was nonreactive earlier this month.   TSH appropriate  No significant electrolyte abnormalities  Recent CT head unrevealing  PT and OT  Sitter at bedside    The patient's friends, pat and Jackie at [phone] have helped care for the patient in the past.  They are increasingly concerned about her mental status inability to care for herself.  They acknowledge that her social affect is intact, but our concern with her frequent confabulation and memory impairment.    Anticipate that the patient will require a skilled nursing facility before transitioning to Memory Care Unit.    VTE Risk Mitigation (From admission, onward)    None             Bc Hernandez MD  Department of Hospital Medicine   [facility] - Emergency Dept

## 2021-12-23 NOTE — PT/OT/SLP EVAL
Occupational Therapy   Evaluation    Name: Keila Romano  MRN: [id number]  Admitting Diagnosis:  Acute cystitis  Recent Surgery: * No surgery found *      Recommendations:     Discharge Recommendations: nursing facility, skilled  Discharge Equipment Recommendations:  rollator,grab bar  Barriers to discharge:  Decreased caregiver support    Assessment:     Keila Romano is a 89 y.o. female with a medical diagnosis of Acute cystitis.  She presents with sitter in room. Pt agreeable to OT today. Performance deficits affecting function: weakness,impaired endurance,impaired self care skills,impaired functional mobilty,gait instability,impaired balance,decreased safety awareness,decreased lower extremity function.      Rehab Prognosis: Fair; patient would benefit from acute skilled OT services to address these deficits and reach maximum level of function.       Plan:     Patient to be seen 5 x/week to address the above listed problems via self-care/home management,therapeutic activities,therapeutic exercises  · Plan of Care Expires: 01/23/22  · Plan of Care Reviewed with: patient    Subjective     Chief Complaint: No complaints reported  Patient/Family Comments/goals: return to previous level of function    Occupational Profile:  Living Environment: Pt appeared confused during OT eval and no assistance for questions was available. Pt reports she lives in a 1 story group home, but could not remember, with steps to enter.   Previous level of function: mod ind for ADLs  Roles and Routines: non reported  Equipment Used at Home:  none  Assistance upon Discharge: possible group home with staff to help    Pain/Comfort:  · Pain Rating 1: 6/10  · Location - Side 1: Bilateral  · Location 1: groin  · Pain Addressed 1: Reposition,Distraction  · Pain Rating Post-Intervention 1: 5/10    Patients cultural, spiritual, Alevism conflicts given the current situation: no    Objective:     Communicated with: nurse prior to session.   Patient found supine with bed alarm,peripheral IV upon OT entry to room.    General Precautions: Standard, fall   Orthopedic Precautions:N/A   Braces: N/A  Respiratory Status: Room air    Occupational Performance:    Bed Mobility:    · Patient completed Rolling/Turning to Right with stand by assistance  · Patient completed Scooting/Bridging with stand by assistance  · Patient completed Supine to Sit with stand by assistance  · Patient completed Sit to Supine with stand by assistance    Functional Mobility/Transfers:  · Patient completed Sit <> Stand Transfer with contact guard assistance  with  rolling walker   · Functional Mobility: Pt ambulated from bed to sink in hospital room for grooming.    Activities of Daily Living:  · Feeding:  independence    · Grooming: stand by assistance while standing at sink  · Bathing: moderate assistance    · Upper Body Dressing: minimum assistance    · Lower Body Dressing: moderate assistance    · Toileting: minimum assistance      Cognitive/Visual Perceptual:  Cognitive/Psychosocial Skills:     -       Oriented to: Person   -       Follows Commands/attention:Follows two-step commands  -       Communication: clear/fluent  -       Memory: Impaired STM and Impaired LTM  -       Safety awareness/insight to disability: impaired   -       Mood/Affect/Coping skills/emotional control: Appropriate to situation, Cooperative and Pleasant  Visual/Perceptual:      -Intact      Physical Exam:  Balance:    -       unsteady gait, requires CGA with rolling walker.   Dominant hand:    -       right  Upper Extremity Range of Motion:     -       Right Upper Extremity: WNL  -       Left Upper Extremity: WNL  Upper Extremity Strength:    -       Right Upper Extremity: WFL  -       Left Upper Extremity: WFL   Strength:    -       Right Upper Extremity: WFL  -       Left Upper Extremity: WFL  Fine Motor Coordination:    -       Intact  Gross motor coordination:   intact    AMPAC 6 Click ADL:  AMPAC  Total Score: 17    Treatment & Education:  Role of OT, call bell use, importance of out of bed activity, safety awareness.  Pt presents confused and disoriented but pleasant. Pt unable to recall previous living environment with 100% accuracy.  Bed mobility, sit<>stand, grooming at sink, ambulation  Education:    Patient left HOB elevated with all lines intact, call button in reach, bed alarm on and sitter present    GOALS:   Multidisciplinary Problems     Occupational Therapy Goals        Problem: Occupational Therapy Goal    Goal Priority Disciplines Outcome Interventions   Occupational Therapy Goal     OT, PT/OT     Description: Goals to be met by: discharge     Patient will increase functional independence with ADLs by performing:    UE Dressing with Contact Guard Assistance.  LE Dressing with Contact Guard Assistance.  Grooming while standing at sink with Contact Guard Assistance.  Toileting from toilet with Contact Guard Assistance for hygiene and clothing management.   Toilet transfer to toilet with Contact Guard Assistance.                     History:     Past Medical History:   Diagnosis Date    Dementia     Hip fracture     L       Past Surgical History:   Procedure Laterality Date    PERCUTANEOUS PINNING OF HIP Right 11/22/2021    Procedure: PINNING, HIP, PERCUTANEOUS;  Surgeon: Navneet Rios MD;  Location: [facility];  Service: Orthopedics;  Laterality: Right;       Time Tracking:     OT Date of Treatment: 12/23/21  OT Start Time: 0935  OT Stop Time: 0958  OT Total Time (min): 23 min    Billable Minutes:Evaluation 8  Self Care/Home Management 15    12/23/2021

## 2021-12-23 NOTE — CONSULTS
[facility]  Orthopedics  Consult Note    Patient Name: Keila Romano  MRN: [id number]  Admission Date: 12/22/2021  Hospital Length of Stay: 0 days  Attending Provider: Lv Brantley MD  Primary Care Provider: Primary Doctor No    Patient information was obtained from ER records.     Consults  Subjective:     Principal Problem:Acute cystitis    Chief Complaint:   Chief Complaint   Patient presents with    PEC PT     PT WAS AT [facility] AND WAS READY TO GO HOME, PT WAS THEN PEC'D AND SENT TO [facility]         HPI: Ortho consult for previous R hip ORIF 11-22-21  - She has not been back to the office for post-op follow up. Has been in SNF.    Per H&P:    89-year-old woman with hip fracture status post repair in November 22, 2021 was discharged to skilled nursing, but has altered mental status versus worsening baseline dementia for which the patient was started on Lexapro and Aricept.  The workup for underlying dementia today revealed pyuria consistent with urinary tract infection.     The patient was initially seen at [facility] after she sustained a left hip fracture that required operative repair on November 22nd of this year.  She was then transferred to a local rehab facility.  During her stay she had increasing confusion.  It was thought that maybe she had underlying dementia and was started on Lexapro and Aricept.  Tele psychiatry was consulted yesterday and it was deemed that the patient did not have capacity to make her own medical decisions.  The patient had been reportedly having episodes of being aggressive and trying to elope from the rehab facility so the physician did place her under at PEC this morning so that she could not leave the facility.      Unable to gather reliable review of systems due to mental status and dementia.     Per discussion with the patient's friends, who are at bedside, the patient has not been working well with physical therapy after her  surgery due to her mental status.  They are concerned about her physical debility.      Past Medical History:   Diagnosis Date    Dementia     Hip fracture     L       Past Surgical History:   Procedure Laterality Date    PERCUTANEOUS PINNING OF HIP Right 11/22/2021    Procedure: PINNING, HIP, PERCUTANEOUS;  Surgeon: Navneet Rios MD;  Location: [facility];  Service: Orthopedics;  Laterality: Right;       Review of patient's allergies indicates:  No Known Allergies    Current Facility-Administered Medications   Medication    acetaminophen tablet 1,000 mg    albuterol-ipratropium 2.5 mg-0.5 mg/3 mL nebulizer solution 3 mL    aluminum-magnesium hydroxide-simethicone 200-200-20 mg/5 mL suspension 30 mL    aluminum-magnesium hydroxide-simethicone 200-200-20 mg/5 mL suspension 30 mL    aspirin EC tablet 81 mg    calcium chloride 1 g in dextrose 5 % 100 mL IVPB    calcium chloride 1 g in dextrose 5 % 100 mL IVPB    calcium chloride 1 g in dextrose 5 % 100 mL IVPB    calcium-vitamin D3 500 mg-5 mcg (200 unit) per tablet 2 tablet    cefTRIAXone (ROCEPHIN) 1 g/50 mL D5W IVPB    dextrose 50% injection 12.5 g    dextrose 50% injection 25 g    donepeziL tablet 5 mg    EScitalopram oxalate tablet 5 mg    ferrous sulfate tablet 1 each    glucagon (human recombinant) injection 1 mg    glucose chewable tablet 16 g    glucose chewable tablet 24 g    ibuprofen tablet 600 mg    magnesium oxide tablet 800 mg    magnesium sulfate 2g in water 50mL IVPB (premix)    magnesium sulfate 2g in water 50mL IVPB (premix)    magnesium sulfate 2g in water 50mL IVPB (premix)    magnesium sulfate in dextrose IVPB (premix) 1 g    meclizine tablet 25 mg    melatonin tablet 6 mg    ondansetron disintegrating tablet 8 mg    polyethylene glycol packet 17 g    potassium bicarbonate disintegrating tablet 35 mEq    potassium bicarbonate disintegrating tablet 50 mEq    potassium bicarbonate disintegrating tablet 60 mEq     potassium chloride 10 mEq in 100 mL IVPB    potassium chloride 10 mEq in 100 mL IVPB    potassium chloride 10 mEq in 100 mL IVPB    potassium chloride 10 mEq in 100 mL IVPB    potassium chloride SA CR tablet 20 mEq    potassium chloride SA CR tablet 20 mEq    potassium chloride SA CR tablet 40 mEq    potassium chloride SA CR tablet 40 mEq    potassium, sodium phosphates 280-160-250 mg packet 2 packet    potassium, sodium phosphates 280-160-250 mg packet 2 packet    potassium, sodium phosphates 280-160-250 mg packet 2 packet    prochlorperazine injection Soln 5 mg    senna-docusate 8.6-50 mg per tablet 1 tablet    simethicone chewable tablet 80 mg    sodium chloride 0.9% flush 10 mL    sodium chloride 0.9% flush 10 mL     Facility-Administered Medications Ordered in Other Encounters   Medication    [MAR Hold - Suspended Admission] acetaminophen tablet 650 mg    [MAR Hold - Suspended Admission] acetaminophen tablet 650 mg    [MAR Hold - Suspended Admission] calcium carbonate 200 mg calcium (500 mg) chewable tablet 500 mg    [MAR Hold - Suspended Admission] calcium-vitamin D3 500 mg(1,250mg) -200 unit per tablet 1 tablet    [MAR Hold - Suspended Admission] donepeziL tablet 5 mg    [MAR Hold - Suspended Admission] enoxaparin injection 40 mg    [MAR Hold - Suspended Admission] EScitalopram oxalate tablet 5 mg    [MAR Hold - Suspended Admission] ferrous gluconate tablet 324 mg    [MAR Hold - Suspended Admission] meclizine tablet 25 mg    [MAR Hold - Suspended Admission] melatonin tablet 6 mg    [MAR Hold - Suspended Admission] multivitamin tablet    [MAR Hold - Suspended Admission] ondansetron disintegrating tablet 4 mg    [MAR Hold - Suspended Admission] polyethylene glycol packet 17 g    [MAR Hold - Suspended Admission] simethicone chewable tablet 80 mg     Family History    None       Tobacco Use    Smoking status: Never Smoker    Smokeless tobacco: Never Used   Substance and Sexual  "Activity    Alcohol use: No    Drug use: Never    Sexual activity: Not on file     Review of Systems   Unable to perform ROS: dementia     Objective:     Vital Signs (Most Recent):  Temp: 98.7 °F (37.1 °C) (12/23/21 0728)  Pulse: 64 (12/23/21 0728)  Resp: 16 (12/23/21 0728)  BP: 125/63 (12/23/21 0728)  SpO2: 96 % (12/23/21 0728) Vital Signs (24h Range):  Temp:  [97.5 °F (36.4 °C)-98.7 °F (37.1 °C)] 98.7 °F (37.1 °C)  Pulse:  [53-94] 64  Resp:  [16-18] 16  SpO2:  [96 %-98 %] 96 %  BP: ()/(62-78) 125/63     Weight: 43.2 kg (95 lb 3.8 oz)  Height: 5' 2" (157.5 cm)  Body mass index is 17.42 kg/m².    No intake or output data in the 24 hours ending 12/23/21 0926    General    Nursing note and vitals reviewed.  Constitutional:   Anorexic.   Pulmonary/Chest: Effort normal.   Neurological: She is alert.   Very pleasant but confused   Psychiatric: She has a normal mood and affect. Her behavior is normal.             Right Hip Exam     Comments:  RLE DNVI. Incision is very well healed. Mild TTP.        Significant Labs:   CBC:   Recent Labs   Lab 12/22/21  0944 12/23/21  0541   WBC 4.70 5.44   HGB 11.1* 10.3*   HCT 35.6* 32.6*    296     CMP:   Recent Labs   Lab 12/22/21  0944 12/23/21  0541    139   K 4.0 4.5    100   CO2 29 30*    93   BUN 24* 25*   CREATININE 0.7 0.6   CALCIUM 8.9 8.7   PROT 6.2 6.2   ALBUMIN 3.2* 3.1*   BILITOT 0.7 0.6   ALKPHOS 86 84   AST 24 21   ALT 22 22   ANIONGAP 10 9   EGFRNONAA >60.0 >60.0     All pertinent labs within the past 24 hours have been reviewed.    Significant Imaging: None    Assessment/Plan:     Closed right hip fracture, initial encounter  R hip X-rays  PT for mobility - RLE WBAT        Thank you for your consult. I will follow-up with patient. Please contact us if you have any additional questions.    DARBY GOMEZ  Orthopedics  [facility]      "

## 2021-12-23 NOTE — CARE UPDATE
12/23/21 0945   Patient Assessment/Suction   Level of Consciousness (AVPU) alert   All Lung Fields Breath Sounds clear   PRE-TX-O2   O2 Device (Oxygen Therapy) room air   SpO2 97 %   Pulse Oximetry Type Intermittent   $ Pulse Oximetry - Multiple Charge Pulse Oximetry - Multiple   Pulse 73   Resp 15   Aerosol Therapy   $ Aerosol Therapy Charges PRN treatment not required   Education   $ Education Bronchodilator;15 min   Respiratory Evaluation   $ Care Plan Tech Time 15 min

## 2021-12-23 NOTE — SUBJECTIVE & OBJECTIVE
Past Medical History:   Diagnosis Date    Dementia     Hip fracture     L       Past Surgical History:   Procedure Laterality Date    PERCUTANEOUS PINNING OF HIP Right 11/22/2021    Procedure: PINNING, HIP, PERCUTANEOUS;  Surgeon: Navneet Rios MD;  [facility];  Service: Orthopedics;  Laterality: Right;       Review of patient's allergies indicates:  No Known Allergies    Current Facility-Administered Medications on File Prior to Encounter   Medication    [MAR Hold - Suspended Admission] acetaminophen tablet 650 mg    [MAR Hold - Suspended Admission] acetaminophen tablet 650 mg    [MAR Hold - Suspended Admission] calcium carbonate 200 mg calcium (500 mg) chewable tablet 500 mg    [MAR Hold - Suspended Admission] calcium-vitamin D3 500 mg(1,250mg) -200 unit per tablet 1 tablet    [MAR Hold - Suspended Admission] donepeziL tablet 5 mg    [MAR Hold - Suspended Admission] enoxaparin injection 40 mg    [MAR Hold - Suspended Admission] EScitalopram oxalate tablet 5 mg    [MAR Hold - Suspended Admission] ferrous gluconate tablet 324 mg    [MAR Hold - Suspended Admission] meclizine tablet 25 mg    [MAR Hold - Suspended Admission] melatonin tablet 6 mg    [MAR Hold - Suspended Admission] multivitamin tablet    [MAR Hold - Suspended Admission] ondansetron disintegrating tablet 4 mg    [MAR Hold - Suspended Admission] polyethylene glycol packet 17 g    [MAR Hold - Suspended Admission] simethicone chewable tablet 80 mg     Current Outpatient Medications on File Prior to Encounter   Medication Sig    acetaminophen (TYLENOL) 500 MG tablet Take 2 tablets (1,000 mg total) by mouth every 8 (eight) hours as needed.    aspirin (ECOTRIN) 81 MG EC tablet Take 1 tablet (81 mg total) by mouth 2 (two) times a day.    calcium-vitamin D3 (OS-EMELY 500 + D3) 500 mg(1,250mg) -200 unit per tablet Take 2 tablets by mouth 2 (two) times daily.    ferrous gluconate (FERGON) 324 MG tablet Take 1 tablet (324 mg total) by  mouth every 48 hours.    ibuprofen (ADVIL,MOTRIN) 600 MG tablet Take 1 tablet (600 mg total) by mouth every 6 (six) hours as needed (pain, alternate with tylenol).    meclizine (ANTIVERT) 25 mg tablet Take 1 tablet (25 mg total) by mouth 3 (three) times daily as needed for Dizziness.    polyethylene glycol (GLYCOLAX) 17 gram PwPk Take 17 g by mouth once daily.    senna-docusate 8.6-50 mg (PERICOLACE) 8.6-50 mg per tablet Take 1 tablet by mouth once daily.    simethicone (MYLICON) 80 MG chewable tablet Take 1 tablet (80 mg total) by mouth 4 (four) times daily as needed for Flatulence.     Family History    None       Tobacco Use    Smoking status: Never Smoker    Smokeless tobacco: Never Used   Substance and Sexual Activity    Alcohol use: No    Drug use: Never    Sexual activity: Not on file     Review of Systems   Unable to perform ROS: Mental status change     Objective:     Vital Signs (Most Recent):  Temp: 98 °F (36.7 °C) (12/22/21 0913)  Pulse: 62 (12/22/21 0913)  Resp: 20 (12/22/21 0913)  BP: 136/61 (12/22/21 0913)  SpO2: 98 % (12/22/21 0913) Vital Signs (24h Range):  Temp:  [98 °F (36.7 °C)-98.4 °F (36.9 °C)] 98 °F (36.7 °C)  Pulse:  [62-75] 62  Resp:  [16-20] 20  SpO2:  [94 %-98 %] 98 %  BP: (114-136)/(54-61) 136/61     Weight: 40.8 kg (90 lb)  Body mass index is 15.69 kg/m².    Physical Exam  Vitals reviewed.   Constitutional:       General: She is not in acute distress.     Appearance: She is not ill-appearing.   HENT:      Mouth/Throat:      Mouth: Mucous membranes are moist.   Eyes:      Pupils: Pupils are equal, round, and reactive to light.   Cardiovascular:      Rate and Rhythm: Normal rate and regular rhythm.      Pulses: Normal pulses.   Pulmonary:      Effort: Pulmonary effort is normal. No respiratory distress.      Breath sounds: No wheezing.   Abdominal:      General: Abdomen is flat. There is no distension.   Musculoskeletal:         General: Normal range of motion.      Cervical  back: Normal range of motion. No rigidity.   Skin:     General: Skin is warm.      Capillary Refill: Capillary refill takes less than 2 seconds.   Neurological:      General: No focal deficit present.      Mental Status: She is alert.      Comments: Moving all 4 extremities.  Pleasant conversant, but not oriented to place, time, or situation.  Social affect intact.           CRANIAL NERVES     CN III, IV, VI   Pupils are equal, round, and reactive to light.       Significant Labs: All pertinent labs within the past 24 hours have been reviewed.    Significant Imaging: I have reviewed all pertinent imaging results/findings within the past 24 hours.

## 2021-12-23 NOTE — HPI
Ortho consult for previous R hip ORIF 11-22-21  - She has not been back to the office for post-op follow up. Has been in SNF.    - R hip X-rays ordered at the time of consult. And reviewed yesterday.

## 2021-12-23 NOTE — CARE UPDATE
contacted Nena at [facility] office to inform of PEC. Nena updated patients file to patients location at [facility] room 1117 and reports case has been assigned to Psychiatrist. Nena confirmed PEC will  on  at 0605.

## 2021-12-23 NOTE — PROGRESS NOTES
[facility] Medicine  Progress Note    Patient name: Keila Romano  MRN: [id number]  Admit Date: 12/22/2021   LOS: 0 days     SUBJECTIVE:     Principal problem: Acute cystitis    Interval History:  Admitted yesterday for altered mental status in the background of dementia.  Patient is pleasantly demented during my encounter.  Nursing staff reports that she gets agitated intermittently and can be hard to redirect with nonpharmacologic interventions.    Hospital course:  89-year-old female with recent right hip fracture repair on 11/22 who has been discharged to skilled nursing facility was brought to the ED secondary to altered mental status.  Workup notable for UTI.  On empiric antibiotics.  PEC was obtained at outside facility prior to her being sent here.       Scheduled Meds:   aluminum-magnesium hydroxide-simethicone  30 mL Oral QID (AC & HS)    aspirin  81 mg Oral BID    calcium-vitamin D3  2 tablet Oral BID    cefTRIAXone (ROCEPHIN) IVPB  1 g Intravenous Q24H    donepeziL  5 mg Oral QHS    EScitalopram oxalate  5 mg Oral QHS    ferrous sulfate  1 tablet Oral Q48H    polyethylene glycol  17 g Oral Daily    senna-docusate 8.6-50 mg  1 tablet Oral Daily     Continuous Infusions:  PRN Meds:acetaminophen, albuterol-ipratropium, aluminum-magnesium hydroxide-simethicone, calcium chloride IVPB, calcium chloride IVPB, calcium chloride IVPB, dextrose 50%, dextrose 50%, glucagon (human recombinant), glucose, glucose, haloperidol lactate, ibuprofen, magnesium oxide, magnesium sulfate IVPB, magnesium sulfate IVPB, magnesium sulfate IVPB, magnesium sulfate IVPB, meclizine, melatonin, ondansetron, potassium bicarbonate, potassium bicarbonate, potassium bicarbonate, potassium chloride in water, potassium chloride in water, potassium chloride in water, potassium chloride in water, potassium chloride, potassium chloride, potassium chloride, potassium chloride, potassium, sodium phosphates,  potassium, sodium phosphates, potassium, sodium phosphates, prochlorperazine, simethicone, sodium chloride 0.9%, sodium chloride 0.9%    Review of patient's allergies indicates:  No Known Allergies    Review of Systems:  Unable to obtain due to dementia    OBJECTIVE:     Vital Signs (Most Recent)  Temp: 98.4 °F (36.9 °C) (12/23/21 1158)  Pulse: 64 (12/23/21 1158)  Resp: 18 (12/23/21 1158)  BP: 116/66 (12/23/21 1158)  SpO2: 95 % (12/23/21 1158)    Vital Signs Range (Last 24H):  Temp:  [97.5 °F (36.4 °C)-98.7 °F (37.1 °C)]   Pulse:  [53-94]   Resp:  [15-18]   BP: ()/(62-78)   SpO2:  [95 %-98 %]     I & O (Last 24H):No intake or output data in the 24 hours ending 12/23/21 2409    Physical Exam:  General: Patient resting comfortably in no acute distress. Appears as stated age. Calm  Eyes: No conjunctival injection. No scleral icterus.  ENT: Hearing grossly intact. No discharge from ears. No nasal discharge.   Neck: Supple, trachea midline. No JVD  CVS: RRR. No LE edema BL  Lungs:Good breath sounds. No accessory muscle use. No acute respiratory distress  Abdomen:  Soft, nontender and nondistended.  No organomegaly  Neuro:  Alert.  Confused.  Skin:  No rash or erythema noted    Laboratory:  I have reviewed all pertinent lab results within the past 24 hours.  CBC:   Recent Labs   Lab 12/23/21  0541   WBC 5.44   RBC 3.43*   HGB 10.3*   HCT 32.6*      MCV 95   MCH 30.0   MCHC 31.6*     CMP:   Recent Labs   Lab 12/23/21  0541   GLU 93   CALCIUM 8.7   ALBUMIN 3.1*   PROT 6.2      K 4.5   CO2 30*      BUN 25*   CREATININE 0.6   ALKPHOS 84   ALT 22   AST 21   BILITOT 0.6       Diagnostic Results:  Labs: Reviewed    Microbiology Results (last 7 days)     Procedure Component Value Units Date/Time    Urine culture [[id number]]  (Abnormal) Collected: 12/22/21 1133    Order Status: Completed Specimen: Urine Updated: 12/23/21 0836     Urine Culture, Routine PRESUMPTIVE PROTEUS SPECIES  >100,000  cfu/ml  Identification and susceptibility pending      Narrative:      Specimen Source->Urine    Blood culture #2 **CANNOT BE ORDERED STAT** [[id number]] Collected: 12/22/21 1320    Order Status: Completed Specimen: Blood from Peripheral, Antecubital, Left Updated: 12/22/21 2117     Blood Culture, Routine No Growth to date    Blood culture #1 **CANNOT BE ORDERED STAT** [[id number]] Collected: 12/22/21 1325    Order Status: Completed Specimen: Blood from Peripheral, Antecubital, Right Updated: 12/22/21 2117     Blood Culture, Routine No Growth to date          ASSESSMENT/PLAN:       Active Hospital Problems    Diagnosis  POA    *Acute cystitis [N30.00]  Yes    Altered mental status [R41.82]  Yes    Vitamin B12 deficiency [E53.8]  Yes    Dementia with behavioral disturbance [F03.91]  Yes    Closed right hip fracture, initial encounter [S72.001A]  Yes      Resolved Hospital Problems   No resolved problems to display.       Plan:   Acute encephalopathy in the background of dementia  Workup notable for Proteus UTI.  Treat with IV antibiotics  Delirium precautions  Finished physical therapy for right hip fracture at skilled nursing facility.  She has been re-evaluated by Orthopedics during this current admission.  Right hip x-rays show satisfactory alignment.  Right lower extremity weight-bearing as tolerated as per Ortho    Case management consulted for geriatric psychiatric placement      VTE Risk Mitigation (From admission, onward)         Ordered     IP VTE HIGH RISK PATIENT  Once         12/23/21 0442                  Department Hospital Medicine  [facility]  Lv Brantley MD  Date of service: 12/23/2021

## 2021-12-24 LAB — BACTERIA UR CULT: ABNORMAL

## 2021-12-24 PROCEDURE — 97116 GAIT TRAINING THERAPY: CPT

## 2021-12-24 PROCEDURE — 25000003 PHARM REV CODE 250: Performed by: STUDENT IN AN ORGANIZED HEALTH CARE EDUCATION/TRAINING PROGRAM

## 2021-12-24 PROCEDURE — 63600175 PHARM REV CODE 636 W HCPCS: Performed by: STUDENT IN AN ORGANIZED HEALTH CARE EDUCATION/TRAINING PROGRAM

## 2021-12-24 PROCEDURE — 25000003 PHARM REV CODE 250: Performed by: EMERGENCY MEDICINE

## 2021-12-24 PROCEDURE — 99900035 HC TECH TIME PER 15 MIN (STAT)

## 2021-12-24 PROCEDURE — 94761 N-INVAS EAR/PLS OXIMETRY MLT: CPT

## 2021-12-24 PROCEDURE — 25000003 PHARM REV CODE 250: Performed by: INTERNAL MEDICINE

## 2021-12-24 PROCEDURE — 12000002 HC ACUTE/MED SURGE SEMI-PRIVATE ROOM

## 2021-12-24 PROCEDURE — 94799 UNLISTED PULMONARY SVC/PX: CPT

## 2021-12-24 PROCEDURE — 99900031 HC PATIENT EDUCATION (STAT)

## 2021-12-24 RX ADMIN — ALUMINUM HYDROXIDE, MAGNESIUM HYDROXIDE, AND SIMETHICONE 30 ML: 200; 200; 20 SUSPENSION ORAL at 06:12

## 2021-12-24 RX ADMIN — CEFTRIAXONE SODIUM 1 G: 1 INJECTION, POWDER, FOR SOLUTION INTRAMUSCULAR; INTRAVENOUS at 11:12

## 2021-12-24 RX ADMIN — CALCIUM CARBONATE-VITAMIN D TAB 500 MG-200 UNIT 2 TABLET: 500-200 TAB at 08:12

## 2021-12-24 RX ADMIN — ALUMINUM HYDROXIDE, MAGNESIUM HYDROXIDE, AND SIMETHICONE 30 ML: 200; 200; 20 SUSPENSION ORAL at 04:12

## 2021-12-24 RX ADMIN — ASPIRIN 81 MG: 81 TABLET, COATED ORAL at 08:12

## 2021-12-24 RX ADMIN — QUETIAPINE 100 MG: 100 TABLET ORAL at 08:12

## 2021-12-24 RX ADMIN — ALUMINUM HYDROXIDE, MAGNESIUM HYDROXIDE, AND SIMETHICONE 30 ML: 200; 200; 20 SUSPENSION ORAL at 11:12

## 2021-12-24 RX ADMIN — DONEPEZIL HYDROCHLORIDE 5 MG: 5 TABLET, FILM COATED ORAL at 08:12

## 2021-12-24 RX ADMIN — ALUMINUM HYDROXIDE, MAGNESIUM HYDROXIDE, AND SIMETHICONE 30 ML: 200; 200; 20 SUSPENSION ORAL at 08:12

## 2021-12-24 RX ADMIN — ESCITALOPRAM 5 MG: 5 TABLET, FILM COATED ORAL at 08:12

## 2021-12-24 NOTE — PLAN OF CARE
Faxed packet for psychiatric placement to [facility], [facility], [facility], and [facility]. Awaiting response.

## 2021-12-24 NOTE — PLAN OF CARE
[facility]  Initial Discharge Assessment       Primary Care Provider: Primary Doctor No    Admission Diagnosis: Urinary tract infection without hematuria, site unspecified [N39.0]  Acute encephalopathy [G93.40]    Admission Date: 12/22/2021  Expected Discharge Date:     Discharge Barriers Identified: No family/friends to help    [place]: [place] [employer] MCARE / Plan: [facility] MCE-[employer] CONNECTIONS / Product Type: Medicare Advantage /     Extended Emergency Contact Information  Primary Emergency Contact: JAYLEN BAE  Mobile Phone: [phone]  Relation: Neighbor  Preferred language: English   needed? No  Secondary Emergency Contact: ELLE BAE  Relation: Neighbor  Preferred language: English   needed? No    Discharge Plan A: Psychiatric hospital  Discharge Plan B: Home      "[id number] - [place] - [address]  [address]  [place] [address]  Phone: [phone] Fax: [phone]      Initial Assessment (most recent)     Adult Discharge Assessment - 12/24/21 1246        Discharge Assessment    Assessment Type Discharge Planning Assessment     Confirmed/corrected address, phone number and insurance Yes     Confirmed Demographics Correct on Facesheet   Patient has a nephew that lives in [place] - Gurinder Maurilioford (International Number - [phone] and Email - [email or web address].  She also has niece - Humairalatasha Sawant (International Number - [phone])    Source of Information health record     Communicated MIKI with patient/caregiver Date not available/Unable to determine     Reason For Admission Acute Cystitis     Lives With alone     Facility Arrived From: [facility]     Do you have help at home or someone to help you manage your care at home? No     Who are your caregiver(s) and their phone number(s)? Jaylen Bae, friend ([phone])      Prior to hospitilization cognitive status: Alert/Oriented     Current cognitive status: Unable to Assess     Walking or Climbing Stairs Difficulty none     Dressing/Bathing Difficulty none     Equipment Currently Used at Home none     Readmission within 30 days? Yes     Patient currently being followed by outpatient case management? No     Do you currently have service(s) that help you manage your care at home? No     Do you take prescription medications? Yes     Do you have prescription coverage? Yes     Do you have any problems affording any of your prescribed medications? No     Is the patient taking medications as prescribed? yes     Who is going to help you get home at discharge? Jackie Donta, friend ([phone])     How do you get to doctors appointments? family or friend will provide     Are you on dialysis? No     Do you take coumadin? No     Discharge Plan A Psychiatric hospital     Discharge Plan B Home     DME Needed Upon Discharge  --   TBD    Discharge Barriers Identified No family/friends to help                 Readmission Assessment (most recent)     Readmission Assessment - 12/24/21 1301        Readmission    Why were you hospitalized in the last 30 days? Closed Right Hip Fracture     Why were you readmitted? New medical problem     When you left the hospital where did you go? SNF     Did patient/caregiver refused recommended DC plan? No     Was this a planned readmission? No

## 2021-12-24 NOTE — PT/OT/SLP PROGRESS
Occupational Therapy      Patient Name:  Keila Romano   MRN:  [id number]    Patient not seen today secondary to Patient unwilling to participate despite encouragement.      12/24/2021

## 2021-12-24 NOTE — PROGRESS NOTES
[facility] Medicine  Progress Note    Patient name: Keila Romano  MRN: [id number]  Admit Date: 12/22/2021   LOS: 1 day     SUBJECTIVE:     Principal problem: Acute cystitis    Interval History:  No acute overnight events reported.  Yesterday evening patient became more agitated requiring haloperidol and quetiapine.  Pleasantly demented during my encounter this morning.    Hospital course:  89-year-old female with recent right hip fracture repair on 11/22 who has been discharged to skilled nursing facility was brought to the ED secondary to altered mental status.  Workup notable for UTI.  On empiric antibiotics.  PEC was obtained at outside facility prior to her being sent here.  Seen by ; CEC in place.     Scheduled Meds:   aluminum-magnesium hydroxide-simethicone  30 mL Oral QID (AC & HS)    aspirin  81 mg Oral BID    calcium-vitamin D3  2 tablet Oral BID    cefTRIAXone (ROCEPHIN) IVPB  1 g Intravenous Q24H    donepeziL  5 mg Oral QHS    EScitalopram oxalate  5 mg Oral QHS    ferrous sulfate  1 tablet Oral Q48H    polyethylene glycol  17 g Oral Daily    QUEtiapine  100 mg Oral QHS    senna-docusate 8.6-50 mg  1 tablet Oral Daily     Continuous Infusions:  PRN Meds:acetaminophen, albuterol-ipratropium, aluminum-magnesium hydroxide-simethicone, calcium chloride IVPB, calcium chloride IVPB, calcium chloride IVPB, dextrose 50%, dextrose 50%, glucagon (human recombinant), glucose, glucose, haloperidol lactate, ibuprofen, magnesium oxide, magnesium sulfate IVPB, magnesium sulfate IVPB, magnesium sulfate IVPB, magnesium sulfate IVPB, meclizine, melatonin, ondansetron, potassium bicarbonate, potassium bicarbonate, potassium bicarbonate, potassium chloride in water, potassium chloride in water, potassium chloride in water, potassium chloride in water, potassium chloride, potassium chloride, potassium chloride, potassium chloride, potassium, sodium phosphates, potassium, sodium  phosphates, potassium, sodium phosphates, prochlorperazine, simethicone, sodium chloride 0.9%, sodium chloride 0.9%    Review of patient's allergies indicates:  No Known Allergies    Review of Systems:  Unable to obtain due to dementia    OBJECTIVE:     Vital Signs (Most Recent)  Temp: 98 °F (36.7 °C) (12/24/21 1110)  Pulse: 74 (12/24/21 1110)  Resp: 18 (12/24/21 1110)  BP: (!) 152/77 (12/24/21 1110)  SpO2: 96 % (12/24/21 1110)    Vital Signs Range (Last 24H):  Temp:  [98 °F (36.7 °C)]   Pulse:  [57-76]   Resp:  [14-18]   BP: (152-158)/(77-82)   SpO2:  [96 %-97 %]     I & O (Last 24H):No intake or output data in the 24 hours ending 12/24/21 1214    Physical Exam:  General: Patient resting comfortably in no acute distress. Appears as stated age. Calm  Eyes: No conjunctival injection. No scleral icterus.  ENT: Hearing grossly intact. No discharge from ears. No nasal discharge.   Neck: Supple, trachea midline. No JVD  CVS: RRR. No LE edema BL  Lungs:Good breath sounds. No accessory muscle use. No acute respiratory distress  Abdomen:  Soft, nontender and nondistended.  No organomegaly  Neuro:  Alert.  Confused.  Skin:  No rash or erythema noted    Laboratory:  I have reviewed all pertinent lab results within the past 24 hours.  CBC:   Recent Labs   Lab 12/23/21  0541   WBC 5.44   RBC 3.43*   HGB 10.3*   HCT 32.6*      MCV 95   MCH 30.0   MCHC 31.6*     CMP:   Recent Labs   Lab 12/23/21  0541   GLU 93   CALCIUM 8.7   ALBUMIN 3.1*   PROT 6.2      K 4.5   CO2 30*      BUN 25*   CREATININE 0.6   ALKPHOS 84   ALT 22   AST 21   BILITOT 0.6       Diagnostic Results:  Labs: Reviewed    Microbiology Results (last 7 days)     Procedure Component Value Units Date/Time    Urine culture [[id number]]  (Abnormal)  (Susceptibility) Collected: 12/22/21 1619    Order Status: Completed Specimen: Urine Updated: 12/24/21 0799     Urine Culture, Routine PROTEUS MIRABILIS  >100,000 cfu/ml      Narrative:      Specimen  Source->Urine    Blood culture #2 **CANNOT BE ORDERED STAT** [[id number]] Collected: 12/22/21 1320    Order Status: Completed Specimen: Blood from Peripheral, Antecubital, Left Updated: 12/23/21 1632     Blood Culture, Routine No Growth to date      No Growth to date    Blood culture #1 **CANNOT BE ORDERED STAT** [[id number]] Collected: 12/22/21 1325    Order Status: Completed Specimen: Blood from Peripheral, Antecubital, Right Updated: 12/23/21 1632     Blood Culture, Routine No Growth to date      No Growth to date          ASSESSMENT/PLAN:       Active Hospital Problems    Diagnosis  POA    *Acute cystitis [N30.00]  Yes    Altered mental status [R41.82]  Yes    Vitamin B12 deficiency [E53.8]  Yes    Dementia with behavioral disturbance [F03.91]  Yes    Closed right hip fracture, initial encounter [S72.001A]  Yes      Resolved Hospital Problems   No resolved problems to display.       Plan:   Acute encephalopathy in the background of dementia  Workup notable for pansensitive Proteus UTI.  Treat with IV antibiotics - last dose today  Delirium precautions  Finished physical therapy for right hip fracture at skilled nursing facility.  She has been re-evaluated by Orthopedics during this current admission.  Right hip x-rays show satisfactory alignment.  Right lower extremity weight-bearing as tolerated as per Ortho  Delirium precautions.  Started on quetiapine scheduled and haloperidol as needed    Case management consulted for geriatric psychiatric placement      VTE Risk Mitigation (From admission, onward)         Ordered     IP VTE HIGH RISK PATIENT  Once         12/23/21 0442                  Department Hospital Medicine  [facility]  Lv Brantley MD  Date of service: 12/24/2021

## 2021-12-24 NOTE — PLAN OF CARE
Faxed packet for psychiatric placement to [facility], [facility], [facility], and [facility]. Awaiting response.

## 2021-12-24 NOTE — PT/OT/SLP PROGRESS
Physical Therapy Treatment    Patient Name:  Keila Romano   MRN:  [id number]    Recommendations:     Discharge Recommendations:  nursing facility, skilled   Discharge Equipment Recommendations: rollator   Barriers to discharge: None    Assessment:     Keila Romano is a 89 y.o. female admitted with a medical diagnosis of Acute cystitis.  She presents with the following impairments/functional limitations:  weakness,impaired endurance,gait instability,impaired balance,impaired functional mobilty,decreased lower extremity function,orthopedic precautions Upon arrival, pt was sitting on EOB unsupported on room air. Nurse extender present w/in room. Pt was CGA sit<>stand, CGA amb w/ RW 85ft. Pt has slight confusion during session.    Rehab Prognosis: Good; patient would benefit from acute skilled PT services to address these deficits and reach maximum level of function.    Recent Surgery: * No surgery found *      Plan:     During this hospitalization, patient to be seen 5 x/week to address the identified rehab impairments via therapeutic activities,therapeutic exercises,neuromuscular re-education and progress toward the following goals:    · Plan of Care Expires:  01/23/22    Subjective     Chief Complaint: none stated   Patient/Family Comments/goals: None stated  Pain/Comfort:  · Pain Rating 1: 0/10      Objective:     Communicated with Gaston HOLLINGSWORTH prior to session.  Patient found sitting on EOB unsupported with peripheral IV upon PT entry to room.     General Precautions: Standard, fall   Orthopedic Precautions:RLE weight bearing as tolerated   Braces: N/A  Respiratory Status: Room air     Functional Mobility:  · Transfers:     · Sit to Stand:  stand by assistance with rolling walker  · Gait: 85ft w/ RW CGA w/ redirection on AD  · Balance: good static and dynamic      AM-PAC 6 CLICK MOBILITY          Therapeutic Activities and Exercises:   importance of mob, safety awareness, AD usage, increased vcs for redirection  of AD usage    Patient left sitting on EOB unsupported with all lines intact, call button in reach and Nurse Extender present..    GOALS:   Multidisciplinary Problems     Physical Therapy Goals        Problem: Physical Therapy Goal    Goal Priority Disciplines Outcome Goal Variances Interventions   Physical Therapy Goal     PT, PT/OT      Description: Goals to be met by: D/C    Patient will increase functional independence with mobility by performin. Supine to sit with Stand-by Assistance  2. Sit to supine with Stand-by Assistance  3. Sit to stand transfer with Stand-by Assistance  4. Gait  x 200 feet with Stand-by Assistance using Rolling Walker.                      Time Tracking:     PT Received On: 21  PT Start Time: 1409     PT Stop Time: 1421  PT Total Time (min): 12 min     Billable Minutes: Gait Training 12    Treatment Type: Treatment  PT/PTA: PT     PTA Visit Number: 0     2021

## 2021-12-24 NOTE — PLAN OF CARE
12/24/21 1515   Discharge Reassessment   Assessment Type Discharge Planning Reassessment   Discharge Plan A Psychiatric hospital   Discharge Plan B Psychiatric hospital   Post-Acute Status   Post-Acute Authorization Placement   Post-Acute Placement Status Referrals Sent   Discharge Delays None known at this time     Referrals sent to [facility], and will await response.

## 2021-12-24 NOTE — CARE UPDATE
12/23/21 8161   Patient Assessment/Suction   Level of Consciousness (AVPU)   (sleeping)   Respiratory Effort Unlabored   Expansion/Accessory Muscles/Retractions expansion symmetric;no retractions;no use of accessory muscles   All Lung Fields Breath Sounds diminished   Rhythm/Pattern, Respiratory pattern regular;unlabored;no shortness of breath reported   PRE-TX-O2   O2 Device (Oxygen Therapy) room air   SpO2 97 %   Pulse Oximetry Type Intermittent   $ Pulse Oximetry - Multiple Charge Pulse Oximetry - Multiple   Pulse (!) 57   Resp 14   Respiratory Evaluation   $ Care Plan Tech Time 15 min   $ Eval/Re-eval Charges Re-evaluation   Evaluation For   (care plan)

## 2021-12-25 PROCEDURE — 25000003 PHARM REV CODE 250: Performed by: STUDENT IN AN ORGANIZED HEALTH CARE EDUCATION/TRAINING PROGRAM

## 2021-12-25 PROCEDURE — 99900035 HC TECH TIME PER 15 MIN (STAT)

## 2021-12-25 PROCEDURE — 25000003 PHARM REV CODE 250: Performed by: EMERGENCY MEDICINE

## 2021-12-25 PROCEDURE — 12000002 HC ACUTE/MED SURGE SEMI-PRIVATE ROOM

## 2021-12-25 PROCEDURE — 25000003 PHARM REV CODE 250: Performed by: INTERNAL MEDICINE

## 2021-12-25 PROCEDURE — 94761 N-INVAS EAR/PLS OXIMETRY MLT: CPT

## 2021-12-25 PROCEDURE — 63600175 PHARM REV CODE 636 W HCPCS: Performed by: INTERNAL MEDICINE

## 2021-12-25 PROCEDURE — 99900031 HC PATIENT EDUCATION (STAT)

## 2021-12-25 RX ORDER — ENOXAPARIN SODIUM 100 MG/ML
40 INJECTION SUBCUTANEOUS
Status: DISCONTINUED | OUTPATIENT
Start: 2021-12-25 | End: 2021-12-27 | Stop reason: HOSPADM

## 2021-12-25 RX ADMIN — ESCITALOPRAM 5 MG: 5 TABLET, FILM COATED ORAL at 09:12

## 2021-12-25 RX ADMIN — ENOXAPARIN SODIUM 40 MG: 40 INJECTION SUBCUTANEOUS at 05:12

## 2021-12-25 RX ADMIN — FERROUS SULFATE TAB 325 MG (65 MG ELEMENTAL FE) 1 EACH: 325 (65 FE) TAB at 09:12

## 2021-12-25 RX ADMIN — CALCIUM CARBONATE-VITAMIN D TAB 500 MG-200 UNIT 2 TABLET: 500-200 TAB at 09:12

## 2021-12-25 RX ADMIN — SENNOSIDES AND DOCUSATE SODIUM 1 TABLET: 8.6; 5 TABLET ORAL at 09:12

## 2021-12-25 RX ADMIN — ALUMINUM HYDROXIDE, MAGNESIUM HYDROXIDE, AND SIMETHICONE 30 ML: 200; 200; 20 SUSPENSION ORAL at 10:12

## 2021-12-25 RX ADMIN — DONEPEZIL HYDROCHLORIDE 5 MG: 5 TABLET, FILM COATED ORAL at 09:12

## 2021-12-25 RX ADMIN — ALUMINUM HYDROXIDE, MAGNESIUM HYDROXIDE, AND SIMETHICONE 30 ML: 200; 200; 20 SUSPENSION ORAL at 09:12

## 2021-12-25 RX ADMIN — ASPIRIN 81 MG: 81 TABLET, COATED ORAL at 09:12

## 2021-12-25 RX ADMIN — QUETIAPINE 100 MG: 100 TABLET ORAL at 09:12

## 2021-12-25 NOTE — PLAN OF CARE
12/25/21 1001   Patient Assessment/Suction   Level of Consciousness (AVPU) alert   Respiratory Effort Normal;Unlabored   All Lung Fields Breath Sounds clear   PRE-TX-O2   O2 Device (Oxygen Therapy) room air   SpO2 95 %   Pulse Oximetry Type Intermittent   $ Pulse Oximetry - Multiple Charge Pulse Oximetry - Multiple   Pulse 74   Resp 18   Aerosol Therapy   $ Aerosol Therapy Charges PRN treatment not required   Respiratory Treatment Status (SVN) PRN treatment not required   Education   $ Education Bronchodilator;15 min   Respiratory Evaluation   $ Care Plan Tech Time 15 min

## 2021-12-25 NOTE — PROGRESS NOTES
[facility]  Orthopedics  Progress Note    Patient Name: Keila Romano  MRN: [id number]  Admission Date: 12/22/2021  Hospital Length of Stay: 2 days  Attending Provider: Lv Brantley MD  Primary Care Provider: Primary Doctor No    Subjective:     Principal Problem:Acute cystitis    Principal Orthopedic Problem: S/p R hip ORIF    Interval History: Admitted with MS changes    Review of patient's allergies indicates:  No Known Allergies    Current Facility-Administered Medications   Medication    acetaminophen tablet 1,000 mg    albuterol-ipratropium 2.5 mg-0.5 mg/3 mL nebulizer solution 3 mL    aluminum-magnesium hydroxide-simethicone 200-200-20 mg/5 mL suspension 30 mL    aluminum-magnesium hydroxide-simethicone 200-200-20 mg/5 mL suspension 30 mL    aspirin EC tablet 81 mg    calcium chloride 1 g in dextrose 5 % 100 mL IVPB    calcium chloride 1 g in dextrose 5 % 100 mL IVPB    calcium chloride 1 g in dextrose 5 % 100 mL IVPB    calcium-vitamin D3 500 mg-5 mcg (200 unit) per tablet 2 tablet    dextrose 50% injection 12.5 g    dextrose 50% injection 25 g    donepeziL tablet 5 mg    EScitalopram oxalate tablet 5 mg    ferrous sulfate tablet 1 each    glucagon (human recombinant) injection 1 mg    glucose chewable tablet 16 g    glucose chewable tablet 24 g    haloperidol lactate injection 2 mg    ibuprofen tablet 600 mg    magnesium oxide tablet 800 mg    magnesium sulfate 2g in water 50mL IVPB (premix)    magnesium sulfate 2g in water 50mL IVPB (premix)    magnesium sulfate 2g in water 50mL IVPB (premix)    magnesium sulfate in dextrose IVPB (premix) 1 g    meclizine tablet 25 mg    melatonin tablet 6 mg    ondansetron disintegrating tablet 8 mg    polyethylene glycol packet 17 g    potassium bicarbonate disintegrating tablet 35 mEq    potassium bicarbonate disintegrating tablet 50 mEq    potassium bicarbonate disintegrating tablet 60 mEq    potassium chloride 10 mEq  in 100 mL IVPB    potassium chloride 10 mEq in 100 mL IVPB    potassium chloride 10 mEq in 100 mL IVPB    potassium chloride 10 mEq in 100 mL IVPB    potassium chloride SA CR tablet 20 mEq    potassium chloride SA CR tablet 20 mEq    potassium chloride SA CR tablet 40 mEq    potassium chloride SA CR tablet 40 mEq    potassium, sodium phosphates 280-160-250 mg packet 2 packet    potassium, sodium phosphates 280-160-250 mg packet 2 packet    potassium, sodium phosphates 280-160-250 mg packet 2 packet    prochlorperazine injection Soln 5 mg    QUEtiapine tablet 100 mg    senna-docusate 8.6-50 mg per tablet 1 tablet    simethicone chewable tablet 80 mg    sodium chloride 0.9% flush 10 mL    sodium chloride 0.9% flush 10 mL     Facility-Administered Medications Ordered in Other Encounters   Medication    [MAR Hold - Suspended Admission] acetaminophen tablet 650 mg    [MAR Hold - Suspended Admission] acetaminophen tablet 650 mg    [MAR Hold - Suspended Admission] calcium carbonate 200 mg calcium (500 mg) chewable tablet 500 mg    [MAR Hold - Suspended Admission] calcium-vitamin D3 500 mg(1,250mg) -200 unit per tablet 1 tablet    [MAR Hold - Suspended Admission] donepeziL tablet 5 mg    [MAR Hold - Suspended Admission] enoxaparin injection 40 mg    [MAR Hold - Suspended Admission] EScitalopram oxalate tablet 5 mg    [MAR Hold - Suspended Admission] ferrous gluconate tablet 324 mg    [MAR Hold - Suspended Admission] meclizine tablet 25 mg    [MAR Hold - Suspended Admission] melatonin tablet 6 mg    [MAR Hold - Suspended Admission] multivitamin tablet    [MAR Hold - Suspended Admission] ondansetron disintegrating tablet 4 mg    [MAR Hold - Suspended Admission] polyethylene glycol packet 17 g    [MAR Hold - Suspended Admission] simethicone chewable tablet 80 mg     Objective:     Vital Signs (Most Recent):  Temp: 97.5 °F (36.4 °C) (12/25/21 1123)  Pulse: 80 (12/25/21 1123)  Resp: 18 (12/25/21  "1123)  BP: 136/71 (12/25/21 1123)  SpO2: 96 % (12/25/21 1123) Vital Signs (24h Range):  Temp:  [97.2 °F (36.2 °C)-99.7 °F (37.6 °C)] 97.5 °F (36.4 °C)  Pulse:  [62-80] 80  Resp:  [14-18] 18  SpO2:  [95 %-99 %] 96 %  BP: (122-137)/(65-72) 136/71     Weight: 43.2 kg (95 lb 3.8 oz)  Height: 5' 2" (157.5 cm)  Body mass index is 17.42 kg/m².    No intake or output data in the 24 hours ending 12/25/21 1149    Ortho/SPM Exam    Significant Labs: All pertinent labs within the past 24 hours have been reviewed.  None    Significant Imaging: X-Ray: I have reviewed all pertinent results/findings and my personal findings are:  R hip X-ray S/p ORIF without complication and good alignement    Assessment/Plan:     Closed right hip fracture, initial encounter  PT for mobility - RLE WBAT  Follow up with Dr. Rios as an out-patient          DARBY GOMEZ  Orthopedics  [facility]  "

## 2021-12-25 NOTE — PLAN OF CARE
12/24/21 0800   Patient Assessment/Suction   Level of Consciousness (AVPU) alert   Respiratory Effort Unlabored;Normal   Expansion/Accessory Muscles/Retractions no use of accessory muscles   Rhythm/Pattern, Respiratory unlabored;pattern regular;depth regular   Cough Frequency no cough   PRE-TX-O2   O2 Device (Oxygen Therapy) room air   SpO2 96 %   Pulse Oximetry Type Intermittent   $ Pulse Oximetry - Multiple Charge Pulse Oximetry - Multiple   Pulse 76   Resp 20   Aerosol Therapy   $ Aerosol Therapy Charges PRN treatment not required   Education   $ Education Bronchodilator;15 min   Respiratory Evaluation   $ Care Plan Tech Time 15 min   $ Eval/Re-eval Charges Re-evaluation   Evaluation For New Orders

## 2021-12-25 NOTE — CARE UPDATE
12/24/21 3364   Patient Assessment/Suction   Level of Consciousness (AVPU) alert   Respiratory Effort Unlabored   Expansion/Accessory Muscles/Retractions expansion symmetric;no retractions;no use of accessory muscles   All Lung Fields Breath Sounds clear   Rhythm/Pattern, Respiratory no shortness of breath reported;pattern regular;unlabored   PRE-TX-O2   O2 Device (Oxygen Therapy) room air   SpO2 96 %   Pulse Oximetry Type Intermittent   $ Pulse Oximetry - Multiple Charge Pulse Oximetry - Multiple   Pulse 78   Resp 14   Aerosol Therapy   $ Aerosol Therapy Charges PRN treatment not required   Respiratory Interventions   Cough And Deep Breathing done with encouragement   Breathing Techniques/Airway Clearance deep/controlled cough encouraged;diaphragmatic breathing promoted   Education   $ Education Bronchodilator;Other (see comment);15 min  (prn tx,deep diaphragmatic breathing exercises)   Respiratory Evaluation   $ Care Plan Tech Time 15 min   $ Eval/Re-eval Charges Re-evaluation   Evaluation For   (care plan)

## 2021-12-25 NOTE — PROGRESS NOTES
[facility] Medicine  Progress Note    Patient name: Keila Romano  MRN: [id number]  Admit Date: 12/22/2021   LOS: 2 days     SUBJECTIVE:     Principal problem: Acute cystitis    Interval History:  No acute overnight events reported. Pleasant and calm during my encounter.  Awaiting psychiatric hospitalization    Hospital course:  89-year-old female with recent right hip fracture repair on 11/22 who has been discharged to skilled nursing facility was brought to the ED secondary to altered mental status.  Workup notable for UTI.  Treated with IV antibiotics.  PEC was obtained at outside facility prior to her being sent here.  Seen by ; CEC in place.     Scheduled Meds:   aluminum-magnesium hydroxide-simethicone  30 mL Oral QID (AC & HS)    aspirin  81 mg Oral BID    calcium-vitamin D3  2 tablet Oral BID    donepeziL  5 mg Oral QHS    EScitalopram oxalate  5 mg Oral QHS    ferrous sulfate  1 tablet Oral Q48H    polyethylene glycol  17 g Oral Daily    QUEtiapine  100 mg Oral QHS    senna-docusate 8.6-50 mg  1 tablet Oral Daily     Continuous Infusions:  PRN Meds:acetaminophen, albuterol-ipratropium, aluminum-magnesium hydroxide-simethicone, calcium chloride IVPB, calcium chloride IVPB, calcium chloride IVPB, dextrose 50%, dextrose 50%, glucagon (human recombinant), glucose, glucose, haloperidol lactate, ibuprofen, magnesium oxide, magnesium sulfate IVPB, magnesium sulfate IVPB, magnesium sulfate IVPB, magnesium sulfate IVPB, meclizine, melatonin, ondansetron, potassium bicarbonate, potassium bicarbonate, potassium bicarbonate, potassium chloride in water, potassium chloride in water, potassium chloride in water, potassium chloride in water, potassium chloride, potassium chloride, potassium chloride, potassium chloride, potassium, sodium phosphates, potassium, sodium phosphates, potassium, sodium phosphates, prochlorperazine, simethicone, sodium chloride 0.9%, sodium chloride  0.9%    Review of patient's allergies indicates:  No Known Allergies    Review of Systems:  Unable to obtain due to dementia    OBJECTIVE:     Vital Signs (Most Recent)  Temp: 97.5 °F (36.4 °C) (12/25/21 1123)  Pulse: 80 (12/25/21 1123)  Resp: 18 (12/25/21 1123)  BP: 136/71 (12/25/21 1123)  SpO2: 96 % (12/25/21 1123)    Vital Signs Range (Last 24H):  Temp:  [97.2 °F (36.2 °C)-99.7 °F (37.6 °C)]   Pulse:  [62-80]   Resp:  [14-18]   BP: (128-137)/(65-72)   SpO2:  [95 %-99 %]     I & O (Last 24H):No intake or output data in the 24 hours ending 12/25/21 1636    Physical Exam:  General: Patient resting comfortably in no acute distress. Appears as stated age. Calm  Eyes: No conjunctival injection. No scleral icterus.  ENT: Hearing grossly intact. No discharge from ears. No nasal discharge.   Neck: Supple, trachea midline. No JVD  Lungs:  No tachypnea or accessory muscle use  Neuro:  Alert.  Confused.  Skin:  No rash or erythema noted    Laboratory:  I have reviewed all pertinent lab results within the past 24 hours.  CBC:   Recent Labs   Lab 12/23/21  0541   WBC 5.44   RBC 3.43*   HGB 10.3*   HCT 32.6*      MCV 95   MCH 30.0   MCHC 31.6*     CMP:   Recent Labs   Lab 12/23/21  0541   GLU 93   CALCIUM 8.7   ALBUMIN 3.1*   PROT 6.2      K 4.5   CO2 30*      BUN 25*   CREATININE 0.6   ALKPHOS 84   ALT 22   AST 21   BILITOT 0.6       Diagnostic Results:  Labs: Reviewed    Microbiology Results (last 7 days)     Procedure Component Value Units Date/Time    Blood culture #1 **CANNOT BE ORDERED STAT** [[id number]] Collected: 12/22/21 1325    Order Status: Completed Specimen: Blood from Peripheral, Antecubital, Right Updated: 12/25/21 1632     Blood Culture, Routine No Growth to date      No Growth to date      No Growth to date      No Growth to date    Blood culture #2 **CANNOT BE ORDERED STAT** [[id number]] Collected: 12/22/21 1320    Order Status: Completed Specimen: Blood from Peripheral, Antecubital, Left  Updated: 12/25/21 1632     Blood Culture, Routine No Growth to date      No Growth to date      No Growth to date      No Growth to date    Urine culture [[id number]]  (Abnormal)  (Susceptibility) Collected: 12/22/21 1133    Order Status: Completed Specimen: Urine Updated: 12/24/21 0737     Urine Culture, Routine PROTEUS MIRABILIS  >100,000 cfu/ml      Narrative:      Specimen Source->Urine          ASSESSMENT/PLAN:       Active Hospital Problems    Diagnosis  POA    *Acute cystitis [N30.00]  Yes    Altered mental status [R41.82]  Yes    Vitamin B12 deficiency [E53.8]  Yes    Dementia with behavioral disturbance [F03.91]  Yes    Closed right hip fracture, initial encounter [S72.001A]  Yes      Resolved Hospital Problems   No resolved problems to display.       Plan:   Acute encephalopathy in the background of dementia  Workup notable for pansensitive Proteus UTI.  Treat with IV antibiotics  Delirium precautions  Finished physical therapy for right hip fracture at skilled nursing facility.  She has been re-evaluated by Orthopedics during this current admission.  Right hip x-rays show satisfactory alignment.  Right lower extremity weight-bearing as tolerated as per Ortho  Delirium precautions.  Started on quetiapine scheduled and haloperidol as needed  CEC in place    Case management consulted for geriatric psychiatric placement      VTE Risk Mitigation (From admission, onward)         Ordered     IP VTE HIGH RISK PATIENT  Once         12/23/21 0442                  Department Hospital Medicine  [facility]  Lv Brantley MD  Date of service: 12/25/2021

## 2021-12-25 NOTE — SUBJECTIVE & OBJECTIVE
Principal Problem:Acute cystitis    Principal Orthopedic Problem: S/p R hip ORIF    Interval History: Admitted with MS changes    Review of patient's allergies indicates:  No Known Allergies    Current Facility-Administered Medications   Medication    acetaminophen tablet 1,000 mg    albuterol-ipratropium 2.5 mg-0.5 mg/3 mL nebulizer solution 3 mL    aluminum-magnesium hydroxide-simethicone 200-200-20 mg/5 mL suspension 30 mL    aluminum-magnesium hydroxide-simethicone 200-200-20 mg/5 mL suspension 30 mL    aspirin EC tablet 81 mg    calcium chloride 1 g in dextrose 5 % 100 mL IVPB    calcium chloride 1 g in dextrose 5 % 100 mL IVPB    calcium chloride 1 g in dextrose 5 % 100 mL IVPB    calcium-vitamin D3 500 mg-5 mcg (200 unit) per tablet 2 tablet    dextrose 50% injection 12.5 g    dextrose 50% injection 25 g    donepeziL tablet 5 mg    EScitalopram oxalate tablet 5 mg    ferrous sulfate tablet 1 each    glucagon (human recombinant) injection 1 mg    glucose chewable tablet 16 g    glucose chewable tablet 24 g    haloperidol lactate injection 2 mg    ibuprofen tablet 600 mg    magnesium oxide tablet 800 mg    magnesium sulfate 2g in water 50mL IVPB (premix)    magnesium sulfate 2g in water 50mL IVPB (premix)    magnesium sulfate 2g in water 50mL IVPB (premix)    magnesium sulfate in dextrose IVPB (premix) 1 g    meclizine tablet 25 mg    melatonin tablet 6 mg    ondansetron disintegrating tablet 8 mg    polyethylene glycol packet 17 g    potassium bicarbonate disintegrating tablet 35 mEq    potassium bicarbonate disintegrating tablet 50 mEq    potassium bicarbonate disintegrating tablet 60 mEq    potassium chloride 10 mEq in 100 mL IVPB    potassium chloride 10 mEq in 100 mL IVPB    potassium chloride 10 mEq in 100 mL IVPB    potassium chloride 10 mEq in 100 mL IVPB    potassium chloride SA CR tablet 20 mEq    potassium chloride SA CR tablet 20 mEq    potassium chloride SA CR  tablet 40 mEq    potassium chloride SA CR tablet 40 mEq    potassium, sodium phosphates 280-160-250 mg packet 2 packet    potassium, sodium phosphates 280-160-250 mg packet 2 packet    potassium, sodium phosphates 280-160-250 mg packet 2 packet    prochlorperazine injection Soln 5 mg    QUEtiapine tablet 100 mg    senna-docusate 8.6-50 mg per tablet 1 tablet    simethicone chewable tablet 80 mg    sodium chloride 0.9% flush 10 mL    sodium chloride 0.9% flush 10 mL     Facility-Administered Medications Ordered in Other Encounters   Medication    [MAR Hold - Suspended Admission] acetaminophen tablet 650 mg    [MAR Hold - Suspended Admission] acetaminophen tablet 650 mg    [MAR Hold - Suspended Admission] calcium carbonate 200 mg calcium (500 mg) chewable tablet 500 mg    [MAR Hold - Suspended Admission] calcium-vitamin D3 500 mg(1,250mg) -200 unit per tablet 1 tablet    [MAR Hold - Suspended Admission] donepeziL tablet 5 mg    [MAR Hold - Suspended Admission] enoxaparin injection 40 mg    [MAR Hold - Suspended Admission] EScitalopram oxalate tablet 5 mg    [MAR Hold - Suspended Admission] ferrous gluconate tablet 324 mg    [MAR Hold - Suspended Admission] meclizine tablet 25 mg    [MAR Hold - Suspended Admission] melatonin tablet 6 mg    [MAR Hold - Suspended Admission] multivitamin tablet    [MAR Hold - Suspended Admission] ondansetron disintegrating tablet 4 mg    [MAR Hold - Suspended Admission] polyethylene glycol packet 17 g    [MAR Hold - Suspended Admission] simethicone chewable tablet 80 mg     Objective:     Vital Signs (Most Recent):  Temp: 97.5 °F (36.4 °C) (12/25/21 1123)  Pulse: 80 (12/25/21 1123)  Resp: 18 (12/25/21 1123)  BP: 136/71 (12/25/21 1123)  SpO2: 96 % (12/25/21 1123) Vital Signs (24h Range):  Temp:  [97.2 °F (36.2 °C)-99.7 °F (37.6 °C)] 97.5 °F (36.4 °C)  Pulse:  [62-80] 80  Resp:  [14-18] 18  SpO2:  [95 %-99 %] 96 %  BP: (122-137)/(65-72) 136/71     Weight: 43.2 kg (95  "lb 3.8 oz)  Height: 5' 2" (157.5 cm)  Body mass index is 17.42 kg/m².    No intake or output data in the 24 hours ending 12/25/21 1149    Ortho/SPM Exam    Significant Labs: All pertinent labs within the past 24 hours have been reviewed.  None    Significant Imaging: X-Ray: I have reviewed all pertinent results/findings and my personal findings are:  R hip X-ray S/p ORIF without complication and good alignement  "

## 2021-12-26 PROCEDURE — 12000002 HC ACUTE/MED SURGE SEMI-PRIVATE ROOM

## 2021-12-26 PROCEDURE — 25000003 PHARM REV CODE 250: Performed by: STUDENT IN AN ORGANIZED HEALTH CARE EDUCATION/TRAINING PROGRAM

## 2021-12-26 PROCEDURE — 25000003 PHARM REV CODE 250: Performed by: EMERGENCY MEDICINE

## 2021-12-26 PROCEDURE — 99900031 HC PATIENT EDUCATION (STAT)

## 2021-12-26 PROCEDURE — 99900035 HC TECH TIME PER 15 MIN (STAT)

## 2021-12-26 PROCEDURE — 25000003 PHARM REV CODE 250: Performed by: INTERNAL MEDICINE

## 2021-12-26 PROCEDURE — 63600175 PHARM REV CODE 636 W HCPCS: Performed by: INTERNAL MEDICINE

## 2021-12-26 PROCEDURE — 94799 UNLISTED PULMONARY SVC/PX: CPT

## 2021-12-26 PROCEDURE — 94761 N-INVAS EAR/PLS OXIMETRY MLT: CPT

## 2021-12-26 RX ADMIN — CALCIUM CARBONATE-VITAMIN D TAB 500 MG-200 UNIT 2 TABLET: 500-200 TAB at 08:12

## 2021-12-26 RX ADMIN — DONEPEZIL HYDROCHLORIDE 5 MG: 5 TABLET, FILM COATED ORAL at 08:12

## 2021-12-26 RX ADMIN — ENOXAPARIN SODIUM 40 MG: 40 INJECTION SUBCUTANEOUS at 04:12

## 2021-12-26 RX ADMIN — ALUMINUM HYDROXIDE, MAGNESIUM HYDROXIDE, AND SIMETHICONE 30 ML: 200; 200; 20 SUSPENSION ORAL at 04:12

## 2021-12-26 RX ADMIN — ESCITALOPRAM 5 MG: 5 TABLET, FILM COATED ORAL at 08:12

## 2021-12-26 RX ADMIN — ALUMINUM HYDROXIDE, MAGNESIUM HYDROXIDE, AND SIMETHICONE 30 ML: 200; 200; 20 SUSPENSION ORAL at 05:12

## 2021-12-26 RX ADMIN — ALUMINUM HYDROXIDE, MAGNESIUM HYDROXIDE, AND SIMETHICONE 30 ML: 200; 200; 20 SUSPENSION ORAL at 08:12

## 2021-12-26 RX ADMIN — ASPIRIN 81 MG: 81 TABLET, COATED ORAL at 08:12

## 2021-12-26 RX ADMIN — ALUMINUM HYDROXIDE, MAGNESIUM HYDROXIDE, AND SIMETHICONE 30 ML: 200; 200; 20 SUSPENSION ORAL at 10:12

## 2021-12-26 RX ADMIN — QUETIAPINE 100 MG: 100 TABLET ORAL at 08:12

## 2021-12-26 RX ADMIN — SENNOSIDES AND DOCUSATE SODIUM 1 TABLET: 8.6; 5 TABLET ORAL at 08:12

## 2021-12-26 NOTE — PROGRESS NOTES
[facility] Medicine  Progress Note    Patient name: Keila Romano  MRN: [id number]  Admit Date: 12/22/2021   LOS: 3 days     SUBJECTIVE:     Principal problem: Acute cystitis    Interval History:  No acute overnight events reported. Awaiting psychiatric hospitalization    Hospital course:  89-year-old female with recent right hip fracture repair on 11/22 who has been discharged to skilled nursing facility was brought to the ED secondary to altered mental status.  Workup notable for UTI.  Treated with IV antibiotics.  PEC was obtained at outside facility prior to her being sent here.  Seen by ; CEC in place.     Scheduled Meds:   aluminum-magnesium hydroxide-simethicone  30 mL Oral QID (AC & HS)    aspirin  81 mg Oral BID    calcium-vitamin D3  2 tablet Oral BID    donepeziL  5 mg Oral QHS    enoxparin  40 mg Subcutaneous Q24H    EScitalopram oxalate  5 mg Oral QHS    ferrous sulfate  1 tablet Oral Q48H    polyethylene glycol  17 g Oral Daily    QUEtiapine  100 mg Oral QHS    senna-docusate 8.6-50 mg  1 tablet Oral Daily     Continuous Infusions:  PRN Meds:acetaminophen, albuterol-ipratropium, aluminum-magnesium hydroxide-simethicone, calcium chloride IVPB, calcium chloride IVPB, calcium chloride IVPB, dextrose 50%, dextrose 50%, glucagon (human recombinant), glucose, glucose, haloperidol lactate, ibuprofen, magnesium oxide, magnesium sulfate IVPB, magnesium sulfate IVPB, magnesium sulfate IVPB, magnesium sulfate IVPB, meclizine, melatonin, ondansetron, potassium bicarbonate, potassium bicarbonate, potassium bicarbonate, potassium chloride in water, potassium chloride in water, potassium chloride in water, potassium chloride in water, potassium chloride, potassium chloride, potassium chloride, potassium chloride, potassium, sodium phosphates, potassium, sodium phosphates, potassium, sodium phosphates, prochlorperazine, simethicone, sodium chloride 0.9%, sodium chloride  0.9%    Review of patient's allergies indicates:  No Known Allergies    Review of Systems:  Unable to obtain due to dementia    OBJECTIVE:     Vital Signs (Most Recent)  Temp: 97.8 °F (36.6 °C) (12/26/21 1114)  Pulse: 77 (12/26/21 1114)  Resp: 18 (12/26/21 1114)  BP: 106/67 (12/26/21 1114)  SpO2: 95 % (12/26/21 1114)    Vital Signs Range (Last 24H):  Temp:  [97.7 °F (36.5 °C)-98.4 °F (36.9 °C)]   Pulse:  [56-85]   Resp:  [17-19]   BP: (106-172)/(67-84)   SpO2:  [95 %-97 %]     I & O (Last 24H):No intake or output data in the 24 hours ending 12/26/21 1529    Physical Exam:  General: Patient resting comfortably in no acute distress. Appears as stated age. Calm  Eyes: No conjunctival injection. No scleral icterus.  ENT: Hearing grossly intact. No discharge from ears. No nasal discharge.   Neck: Supple, trachea midline. No JVD  Lungs:  No tachypnea or accessory muscle use  Neuro:  Alert.  Confused.  Skin:  No rash or erythema noted    Laboratory:  I have reviewed all pertinent lab results within the past 24 hours.  CBC:   Recent Labs   Lab 12/23/21  0541   WBC 5.44   RBC 3.43*   HGB 10.3*   HCT 32.6*      MCV 95   MCH 30.0   MCHC 31.6*     CMP:   Recent Labs   Lab 12/23/21  0541   GLU 93   CALCIUM 8.7   ALBUMIN 3.1*   PROT 6.2      K 4.5   CO2 30*      BUN 25*   CREATININE 0.6   ALKPHOS 84   ALT 22   AST 21   BILITOT 0.6       Diagnostic Results:  Labs: Reviewed    Microbiology Results (last 7 days)     Procedure Component Value Units Date/Time    Blood culture #1 **CANNOT BE ORDERED STAT** [[id number]] Collected: 12/22/21 1325    Order Status: Completed Specimen: Blood from Peripheral, Antecubital, Right Updated: 12/25/21 1632     Blood Culture, Routine No Growth to date      No Growth to date      No Growth to date      No Growth to date    Blood culture #2 **CANNOT BE ORDERED STAT** [[id number]] Collected: 12/22/21 1320    Order Status: Completed Specimen: Blood from Peripheral, Antecubital, Left  Updated: 12/25/21 1632     Blood Culture, Routine No Growth to date      No Growth to date      No Growth to date      No Growth to date    Urine culture [[id number]]  (Abnormal)  (Susceptibility) Collected: 12/22/21 1133    Order Status: Completed Specimen: Urine Updated: 12/24/21 0737     Urine Culture, Routine PROTEUS MIRABILIS  >100,000 cfu/ml      Narrative:      Specimen Source->Urine          ASSESSMENT/PLAN:       Active Hospital Problems    Diagnosis  POA    *Acute cystitis [N30.00]  Yes    Altered mental status [R41.82]  Yes    Vitamin B12 deficiency [E53.8]  Yes    Dementia with behavioral disturbance [F03.91]  Yes    Closed right hip fracture, initial encounter [S72.001A]  Yes      Resolved Hospital Problems   No resolved problems to display.       Plan:   Acute encephalopathy in the background of dementia  Workup notable for pansensitive Proteus UTI.  Treat with IV antibiotics  Delirium precautions  Finished physical therapy for right hip fracture at [facility] nursing facility.  She has been re-evaluated by Orthopedics during this current admission.  Right hip x-rays show satisfactory alignment.  Right lower extremity weight-bearing as tolerated as per Ortho  Delirium precautions.  Started on quetiapine scheduled and haloperidol as needed  CEC in place    Case management consulted for geriatric psychiatric placement      VTE Risk Mitigation (From admission, onward)         Ordered     enoxaparin injection 40 mg  Every 24 hours (non-standard times)         12/25/21 1638     IP VTE HIGH RISK PATIENT  Once         12/23/21 0442                  Department Hospital Medicine  [facility]  Lv Brantley MD  Date of service: 12/26/2021

## 2021-12-26 NOTE — CARE UPDATE
12/26/21 0428   PRE-TX-O2   O2 Device (Oxygen Therapy) room air   SpO2 96 %   Pulse Oximetry Type Intermittent   $ Pulse Oximetry - Multiple Charge Pulse Oximetry - Multiple   Pulse 85   Resp 19   Education   $ Education 15 min   Respiratory Evaluation   $ Care Plan Tech Time 15 min

## 2021-12-27 VITALS
HEIGHT: 62 IN | OXYGEN SATURATION: 98 % | SYSTOLIC BLOOD PRESSURE: 163 MMHG | HEART RATE: 66 BPM | BODY MASS INDEX: 17.53 KG/M2 | DIASTOLIC BLOOD PRESSURE: 81 MMHG | TEMPERATURE: 97 F | RESPIRATION RATE: 18 BRPM | WEIGHT: 95.25 LBS

## 2021-12-27 PROBLEM — N30.00 ACUTE CYSTITIS: Status: RESOLVED | Noted: 2021-12-22 | Resolved: 2021-12-27

## 2021-12-27 LAB
BACTERIA BLD CULT: NORMAL
BACTERIA BLD CULT: NORMAL
SARS-COV-2 RDRP RESP QL NAA+PROBE: NEGATIVE

## 2021-12-27 PROCEDURE — 97535 SELF CARE MNGMENT TRAINING: CPT

## 2021-12-27 PROCEDURE — U0002 COVID-19 LAB TEST NON-CDC: HCPCS | Performed by: INTERNAL MEDICINE

## 2021-12-27 PROCEDURE — 63600175 PHARM REV CODE 636 W HCPCS: Performed by: INTERNAL MEDICINE

## 2021-12-27 PROCEDURE — 97116 GAIT TRAINING THERAPY: CPT

## 2021-12-27 PROCEDURE — 25000003 PHARM REV CODE 250: Performed by: STUDENT IN AN ORGANIZED HEALTH CARE EDUCATION/TRAINING PROGRAM

## 2021-12-27 RX ORDER — QUETIAPINE FUMARATE 100 MG/1
100 TABLET, FILM COATED ORAL NIGHTLY
Qty: 30 TABLET | Refills: 11
Start: 2021-12-27 | End: 2022-10-31 | Stop reason: SDUPTHER

## 2021-12-27 RX ORDER — DONEPEZIL HYDROCHLORIDE 5 MG/1
5 TABLET, FILM COATED ORAL NIGHTLY
Qty: 30 TABLET | Refills: 11
Start: 2021-12-27 | End: 2022-04-06

## 2021-12-27 RX ORDER — ESCITALOPRAM OXALATE 5 MG/1
5 TABLET ORAL NIGHTLY
Qty: 30 TABLET | Refills: 11
Start: 2021-12-27 | End: 2022-04-06

## 2021-12-27 RX ADMIN — SENNOSIDES AND DOCUSATE SODIUM 1 TABLET: 8.6; 5 TABLET ORAL at 09:12

## 2021-12-27 RX ADMIN — HALOPERIDOL LACTATE 2 MG: 5 INJECTION, SOLUTION INTRAMUSCULAR at 04:12

## 2021-12-27 RX ADMIN — ASPIRIN 81 MG: 81 TABLET, COATED ORAL at 09:12

## 2021-12-27 RX ADMIN — CALCIUM CARBONATE-VITAMIN D TAB 500 MG-200 UNIT 2 TABLET: 500-200 TAB at 09:12

## 2021-12-27 RX ADMIN — ALUMINUM HYDROXIDE, MAGNESIUM HYDROXIDE, AND SIMETHICONE 30 ML: 200; 200; 20 SUSPENSION ORAL at 09:12

## 2021-12-27 RX ADMIN — FERROUS SULFATE TAB 325 MG (65 MG ELEMENTAL FE) 1 EACH: 325 (65 FE) TAB at 09:12

## 2021-12-27 NOTE — PT/OT/SLP PROGRESS
"Physical Therapy Treatment    Patient Name:  Keila Romano   MRN:  [id number]    Recommendations:     Discharge Recommendations:  nursing facility, skilled   Discharge Equipment Recommendations: rollator   Barriers to discharge: Decreased caregiver support    Assessment:     Keila Romano is a 89 y.o. female admitted with a medical diagnosis of Acute cystitis.  She presents with the following impairments/functional limitations:  weakness,impaired endurance,impaired functional mobilty,gait instability,impaired balance,decreased lower extremity function,impaired cognition,decreased safety awareness,pain,orthopedic precautions.    Pt found resting with HOB slightly elevated, sitting present in room and pt agreeable with participating in PT session. Pt reported her legs are very sore "for about 6 months." Pt tolerated session fairly well, despite exhibiting an antalgic pattern needing CGA and frequent vc for safe use of RW (to remain inside RW base).     Rehab Prognosis: Fair; patient would benefit from acute skilled PT services to address these deficits and reach maximum level of function.    Recent Surgery: * No surgery found *      Plan:     During this hospitalization, patient to be seen 5 x/week to address the identified rehab impairments via gait training,therapeutic activities,therapeutic exercises,neuromuscular re-education and progress toward the following goals:    · Plan of Care Expires:  01/23/22    Subjective     Chief Complaint: "my legs are very sore."  Patient/Family Comments/goals: SNF  Pain/Comfort:  · Pain Rating 1:  (not rated)  · Location - Side 1: Bilateral  · Location 1: leg  · Pain Addressed 1: Reposition,Distraction  · Pain Rating Post-Intervention 1:  (not rated)      Objective:     Communicated with sitter prior to and after session.  Patient found HOB elevated with bed alarm,peripheral IV (sitter in room) upon PT entry to room.     General Precautions: Standard, fall   Orthopedic " Precautions:RLE weight bearing as tolerated   Braces: N/A  Respiratory Status: Room air     Functional Mobility:  · Bed Mobility:     · Scooting: stand by assistance  · Supine to Sit: minimum assistance  · Sit to Supine: stand by assistance  · Transfers:     · Sit to Stand:  contact guard assistance and minimum assistance with rolling walker  · Gait: x 200 feet with rolling walker and minimal A > CGA for balance and frequent vc for remaining inside RW base for increased safety.      AM-PAC 6 CLICK MOBILITY          Therapeutic Activities and Exercises:   Pt was educated on the following: call light use, importance of OOB activity and functional mobility to negate the negative effects of prolonged bed rest during this hospitalization, safe transfers/ambulation and discharge planning recommendations/options.      Patient left HOB elevated with all lines intact, call button in reach, bed alarm on, RN notified and sitter present..    GOALS:   Multidisciplinary Problems     Physical Therapy Goals        Problem: Physical Therapy Goal    Goal Priority Disciplines Outcome Goal Variances Interventions   Physical Therapy Goal     PT, PT/OT      Description: Goals to be met by: D/C    Patient will increase functional independence with mobility by performin. Supine to sit with Stand-by Assistance  2. Sit to supine with Stand-by Assistance  3. Sit to stand transfer with Stand-by Assistance  4. Gait  x 200 feet with Stand-by Assistance using Rolling Walker.                      Time Tracking:     PT Received On: 21  PT Start Time: 1347     PT Stop Time: 1357  PT Total Time (min): 10 min     Billable Minutes: Gait Training 10    Treatment Type: Treatment  PT/PTA: PT     PTA Visit Number: 0     2021

## 2021-12-27 NOTE — PLAN OF CARE
12/27/21 1300   Post-Acute Status   Post-Acute Authorization Placement   Post-Acute Placement Status Referrals Sent   Discharge Plan   Discharge Plan A Psychiatric hospital   Discharge Plan B Skilled Nursing Facility     Referrals sent to the following for Psych placement         [facility]       [address] [place] [address] [place] [address]        [facility]          [address] [facility] [place] [address]        [facility]        [address] [place] [address]        [facility]      [address] [facility] [address]      [facility] Intake        Phone: [phone]   Fax: [phone]     [facility]       [address] [facility] [place] [address]        [facility]     [address] [facility] [address] [place] [address]-IP PSYCH          [address] [facility] [address]      [facility]          [address] [place] [address]      [facility]          [address], [place] [address]        [facility]          [address] [facility] [place] [address]

## 2021-12-27 NOTE — PT/OT/SLP PROGRESS
Occupational Therapy   Treatment    Name: Keila Romano  MRN: [id number]  Admitting Diagnosis:  Acute cystitis       Recommendations:     Discharge Recommendations: nursing facility, skilled  Discharge Equipment Recommendations:  rollator,grab bar  Barriers to discharge:  Decreased caregiver support    Assessment:     Keila Romano is a 89 y.o. female with a medical diagnosis of Acute cystitis.  She presents with sitter in room. Performance deficits affecting function are weakness,impaired endurance,impaired balance,impaired functional mobilty,decreased lower extremity function,orthopedic precautions.     Rehab Prognosis:  Fair; patient would benefit from acute skilled OT services to address these deficits and reach maximum level of function.       Plan:     Patient to be seen 5 x/week to address the above listed problems via self-care/home management,therapeutic activities,therapeutic exercises  · Plan of Care Expires: 01/23/22  · Plan of Care Reviewed with: patient    Subjective     Pain/Comfort:  · Pain Rating 1: 0/10    Objective:     Communicated with: nurse prior to session.  Patient found HOB elevated with peripheral IV upon OT entry to room.    General Precautions: Standard, fall   Orthopedic Precautions:RLE weight bearing as tolerated   Braces: N/A  Respiratory Status: Room air     Occupational Performance:     Bed Mobility:    · Patient completed Rolling/Turning to Left with  stand by assistance  · Patient completed Rolling/Turning to Right with stand by assistance  · Patient completed Scooting/Bridging with stand by assistance  · Patient completed Supine to Sit with stand by assistance  · Patient completed Sit to Supine with stand by assistance     Functional Mobility/Transfers:  · Patient completed Sit <> Stand Transfer with stand by assistance  with  rolling walker   · Functional Mobility: Pt ambulated from bed to sink in room.    Activities of Daily Living:  · Grooming: stand by assistance while  standing at sink      [facility] 6 Click ADL:      Treatment & Education:  Role of OT, call bell use, safety awareness, importance of out of bed activity.  Pt refused sitting in chair after therapy session, Pt completed grooming while standing at sink.     Patient left HOB elevated with all lines intact, call button in reach and bed alarm onEducation:      GOALS:   Multidisciplinary Problems     Occupational Therapy Goals        Problem: Occupational Therapy Goal    Goal Priority Disciplines Outcome Interventions   Occupational Therapy Goal     OT, PT/OT     Description: Goals to be met by: discharge     Patient will increase functional independence with ADLs by performing:    UE Dressing with Contact Guard Assistance.  LE Dressing with Contact Guard Assistance.  Grooming while standing at sink with Contact Guard Assistance.  Toileting from toilet with Contact Guard Assistance for hygiene and clothing management.   Toilet transfer to toilet with Contact Guard Assistance.                     Time Tracking:     OT Date of Treatment: 12/27/21  OT Start Time: 1049  OT Stop Time: 1102  OT Total Time (min): 13 min    Billable Minutes:Self Care/Home Management 13    OT/BAUTISTA: OT          12/27/2021

## 2021-12-27 NOTE — PLAN OF CARE
Problem: Oral Intake Inadequate  Goal: Improved Oral Intake  Outcome: Ongoing, Progressing  Intervention: Promote and Optimize Oral Intake  Flowsheets (Taken 12/27/2021 1110)  Oral Nutrition Promotion: (Ensure TID) calorie-dense liquids provided

## 2021-12-27 NOTE — PLAN OF CARE
12/27/21 1555   Final Note   Assessment Type Final Discharge Note   Anticipated Discharge Disposition Psych   Post-Acute Status   Post-Acute Authorization Placement  ([facility])   Post-Acute Placement Status Set-up Complete/Auth obtained      I spoke with Gurinder and provide him the number to Lyndsay at [facility] in [place] so he could give consent for treatment.   I notified Lyndsay that he was calling her.  His number: [phone]  CM arranged transportation via \[facility]\"".  Number sent to the Nurse Via secure chat to call report.

## 2021-12-27 NOTE — PLAN OF CARE
"   12/23/21 1500   Discharge Reassessment   Assessment Type Discharge Planning Reassessment   Discharge Plan discussed with:   (Simone- Admin at [facility])   Discharge Plan A Psychiatric hospital   Discharge Plan B Skilled Nursing Facility  ([facility])   Discharge Barriers Identified No family/friends to help   Why the patient remains in the hospital Placement issues   Post-Acute Status   Post-Acute Placement Status Referrals Sent   Discharge Delays (!) Patient and Family Barriers     12/23/21 @ 1500 Spoke with Winsome- Admin at [facility].  Per Winsome they will take the patient back for placement if patient can make her own decisions and cleared by psych.  12/24/21 @ 1731 Message sent to Winsome that [facility] is willing to accept patient if [facility] will take her back for placement.. I spoke with Winsome and she stated " If she has a need for rehab they can accept her back from psych".  Winsome reached out to [facility] to discuss with them.   6803 Winsome call me back to let me know she spoke with someone at [facility].  They could take the patient back if she could make her decisions but that someone needs to do an interdiction.   ER CM to continue send out referrals for psych placement.   "

## 2021-12-27 NOTE — PLAN OF CARE
12/27/21 1406   Post-Acute Status   Post-Acute Authorization Placement   Post-Acute Placement Status Pending post-acute provider review/more information requested   Discharge Plan   Discharge Plan A Psychiatric hospital   Discharge Plan B Skilled Nursing Facility     Spoke with Lyndsay at [facility] in [place] she has accepted the patient pending a negative covid. [facility] faxed.  Awaiting Covid results. Will need to contact her nephew when she transfers to [facility] [place].   email address on file: [email or web address]     1515: Lyndsay called back and confirmed that she has received the neg. Covid.  Number to call report: [phone] Opt. 4 and ask for Araceli WESLEY to set up transportation.   1520: Lyndsay called back and needs to contact family to obtain consent.  I emailed Gurinder to confirm his phone number and to inform him where his aunt was going.  His sister will be here on 12/29/2021.  He is currently in the [place] traveling here. Awaiting a response back from Gurinder.

## 2021-12-27 NOTE — HOSPITAL COURSE
89-year-old female with recent right hip fracture repair on 11/22 who has been discharged to skilled nursing facility was brought to the ED secondary to altered mental status.  Workup notable for UTI.  Treated with IV antibiotics.  PEC was obtained at outside facility prior to her being sent here.  Seen by  here; [facility] was obtained.     Appears that patient has dementia with behavioral disturbances.  She would benefit from psychiatric hospitalization.  Family (next of kin is nephew Gurinder; who resides in [place]) has been notified of this and they are in agreement with the same.  She has been deemed medically stable to be discharged to psychiatric facility.    Re-evaluate by Orthopedics during this admission due to her history of recent hip fracture repair.  Recommended weight-bearing as tolerated for right lower extremity.    Physical exam on the day of discharge:  General: Patient resting comfortably in no acute distress.  Lungs: CTA. Good air entry.  Cor: Regular rate and rhythm. No murmurs. No pedal edema.  Abd: Soft. Nontender. Non-distended.  Neuro:  Alert.  Confused.  Moving all 4 extremities equally  Ext: No clubbing. No cyanosis.

## 2021-12-27 NOTE — PROGRESS NOTES
"[facility]  Adult Nutrition   Progress Note (Initial Assessment)     SUMMARY     Recommendations  1. Continue current diet as tolerated by pt.   2. Encourage intake.  3. RD ordered ONS Ensure TID to assist pt with meeting estimated energy and protein needs and provide 1050kcals and 60g PRO per day.    Goals:   1. Pt to meet at least 75% of estimated energy and protein needs via oral intake of meals.  Nutrition Goal Status: new    Dietitian Rounds Brief   Pt assessed 2/2 LOS. Pt awaiting placement. Noted pt BMI:17.42, however pt with no significant wt loss in ~3-4 years per wt hx. RD unable to perform NFPE at this time. RD ordered ONS Ensure TID to assist pt with meeting estimated needs. Will continue to monitor and make recommendations accordingly.    Reason for Assessment  Reason For Assessment: length of stay,low BMI  Diagnosis: other (see comments) (Acute cystitis)  Relevant Medical History: Dementia with behavioral disturbance, vitamin B12 deficiency, AMS, malnutrition of moderate degree  Interdisciplinary Rounds: did not attend    Nutrition Risk Screen  Nutrition Risk Screen: no indicators present     MST Score: 0  Have you recently lost weight without trying?: No  Weight loss score: 0  Have you been eating poorly because of a decreased appetite?: No  Appetite score: 0     Nutrition/Diet History  Spiritual, Cultural Beliefs, Temple Practices, Values that Affect Care: no  Food Allergies: NKFA  Factors Affecting Nutritional Intake: impaired cognitive status/motor control    Anthropometrics  Temp: 97.4 °F (36.3 °C)  Height Method: Stated  Height: 5' 2" (157.5 cm)  Height (inches): 62 in  Weight Method: Bed Scale  Weight: 43.2 kg (95 lb 3.8 oz)  Weight (lb): 95.24 lb  Ideal Body Weight (IBW), Female: 110 lb  % Ideal Body Weight, Female (lb): 86.58 %  BMI (Calculated): 17.4  BMI Grade: 17 - 18.4 protein-energy malnutrition grade I       Weight History:  Wt Readings from Last 10 Encounters: "   12/22/21 43.2 kg (95 lb 3.8 oz)   12/19/21 43.5 kg (96 lb)   11/22/21 44.9 kg (99 lb)   10/24/18 50 kg (110 lb 3.7 oz)     Lab/Procedures/Meds: Pertinent Labs Reviewed    Clinical Chemistry:  Recent Labs   Lab 12/23/21  0541      K 4.5      CO2 30*   GLU 93   BUN 25*   CREATININE 0.6   CALCIUM 8.7   PROT 6.2   ALBUMIN 3.1*   BILITOT 0.6   ALKPHOS 84   AST 21   ALT 22   ANIONGAP 9   ESTGFRAFRICA >60.0   EGFRNONAA >60.0       CBC:   Recent Labs   Lab 12/23/21  0541   WBC 5.44   RBC 3.43*   HGB 10.3*   HCT 32.6*      MCV 95   MCH 30.0   MCHC 31.6*     Thyroid & Parathyroid:  Recent Labs   Lab 12/22/21  0944   TSH 2.250       Vitamins:  Recent Labs   Lab 12/23/21  0541   [id number]     Medications: Pertinent Medications reviewed    Scheduled Meds:   aluminum-magnesium hydroxide-simethicone  30 mL Oral QID (AC & HS)    aspirin  81 mg Oral BID    calcium-vitamin D3  2 tablet Oral BID    donepeziL  5 mg Oral QHS    enoxparin  40 mg Subcutaneous Q24H    EScitalopram oxalate  5 mg Oral QHS    ferrous sulfate  1 tablet Oral Q48H    polyethylene glycol  17 g Oral Daily    QUEtiapine  100 mg Oral QHS    senna-docusate 8.6-50 mg  1 tablet Oral Daily     Continuous Infusions:    PRN Meds:.acetaminophen, albuterol-ipratropium, aluminum-magnesium hydroxide-simethicone, calcium chloride IVPB, calcium chloride IVPB, calcium chloride IVPB, dextrose 50%, dextrose 50%, glucagon (human recombinant), glucose, glucose, haloperidol lactate, ibuprofen, magnesium oxide, magnesium sulfate IVPB, magnesium sulfate IVPB, magnesium sulfate IVPB, magnesium sulfate IVPB, meclizine, melatonin, ondansetron, potassium bicarbonate, potassium bicarbonate, potassium bicarbonate, potassium chloride in water, potassium chloride in water, potassium chloride in water, potassium chloride in water, potassium chloride, potassium chloride, potassium chloride, potassium chloride, potassium, sodium phosphates, potassium, sodium  phosphates, potassium, sodium phosphates, prochlorperazine, simethicone, sodium chloride 0.9%, sodium chloride 0.9%    Estimated/Assessed Needs  Weight Used For Calorie Calculations: 43.2 kg (95 lb 3.8 oz)  Energy Calorie Requirements (kcal): 1512-1728kcals (35-40kcals/kg)  Energy Need Method: Kcal/kg  Protein Requirements: 52-65g (1.2-1.5g/kg)  Weight Used For Protein Calculations: 43.2 kg (95 lb 3.8 oz)     Estimated Fluid Requirement Method: RDA Method  RDA Method (mL): 1512       Nutrition Prescription Ordered  Current Diet Order: Regular    Evaluation of Received Nutrient/Fluid Intake  Energy Calories Required: not meeting needs  Protein Required: not meeting needs  Fluid Required: meeting needs  Tolerance: tolerating     Intake/Output Summary (Last 24 hours) at 12/27/2021 0830  Last data filed at 12/26/2021 1650  Gross per 24 hour   Intake --   Output 1 ml   Net -1 ml      Nutrition Risk  Level of Risk/Frequency of Follow-up: moderate - high     Monitor and Evaluation  Food and Nutrient Intake: energy intake,food and beverage intake  Food and Nutrient Adminstration: diet order  Physical Activity and Function: nutrition-related ADLs and IADLs,factors affecting access to physical activity  Anthropometric Measurements: weight,weight change,body mass index  Biochemical Data, Medical Tests and Procedures: electrolyte and renal panel,gastrointestinal profile,glucose/endocrine profile,inflammatory profile,lipid profile  Nutrition-Focused Physical Findings: overall appearance     Nutrition Follow-Up  RD Follow-up?: Yes   Cecy Rosales, GADIEL  12/27/2021, 11:08 AM

## 2021-12-28 NOTE — DISCHARGE SUMMARY
[facility]  Discharge Summary      Patient Name: Keila Romano  MRN: [id number]  Patient Class: IP- Inpatient  Admission Date: 12/22/2021  Hospital Length of Stay: 4 days  Discharge Date and Time: 12/27/2021  5:21 PM  Attending Physician: Alyx att. providers found   Discharging Provider: Lv Brantley MD  Primary Care Provider: Primary Doctor Alyx      HPI:   89-year-old woman with hip fracture status post repair in November 2 was discharged to skilled nursing, but has altered mental status versus worsening baseline dementia for which the patient was started on Lexapro and Aricept.  The workup for underlying dementia today revealed pyuria consistent with urinary tract infection.    The patient was initially seen at [facility] after she sustained a left hip fracture that required operative repair on November 22nd of this year.  She was then transferred to a local rehab facility.  During her stay she had increasing confusion.  It was thought that maybe she had underlying dementia and was started on Lexapro and Aricept.  Tele psychiatry was consulted yesterday and it was deemed that the patient did not have capacity to make her own medical decisions.  The patient had been reportedly having episodes of being aggressive and trying to elope from the rehab facility so the physician did place her under at [facility] this morning so that she could not leave the facility.     Unable to gather reliable review of systems due to mental status and dementia.    Per discussion with the patient's friends, who are at bedside, the patient has not been working well with physical therapy after her surgery due to her mental status.  They are concerned about her physical debility.      * No surgery found *      Hospital Course:   89-year-old female with recent right hip fracture repair on 11/22 who has been discharged to skilled nursing facility was brought to the ED secondary to altered mental status.   Workup notable for UTI.  Treated with IV antibiotics.  PEC was obtained at outside facility prior to her being sent here.  Seen by  here; [facility] was obtained.     Appears that patient has dementia with behavioral disturbances.  She would benefit from psychiatric hospitalization.  Family (next of kin is nephew Gurinder; who resides in [place]) has been notified of this and they are in agreement with the same.  She has been deemed medically stable to be discharged to psychiatric facility.    Re-evaluate by Orthopedics during this admission due to her history of recent hip fracture repair.  Recommended weight-bearing as tolerated for right lower extremity.    Physical exam on the day of discharge:  General: Patient resting comfortably in no acute distress.  Lungs: CTA. Good air entry.  Cor: Regular rate and rhythm. No murmurs. No pedal edema.  Abd: Soft. Nontender. Non-distended.  Neuro:  Alert.  Confused.  Moving all 4 extremities equally  Ext: No clubbing. No cyanosis.          Goals of Care Treatment Preferences:  Code Status: Full Code      Consults:   Consults (From admission, onward)        Status Ordering Provider     Inpatient consult to Social Work/Case Management  Once        Provider:  (Not yet assigned)    Completed KAELYN DAWN     Inpatient consult to Orthopedics  Once        Provider:  Navneet Rios MD    Acknowledged CARLEE LAZCANO     Inpatient consult to Social Work/Case Management  Once        Provider:  (Not yet assigned)    Completed CARLEE LAZCANO     Inpatient consult to Hospitalist  Once        Provider:  Bc Hernandez MD    Acknowledged GOLD BALDERAS     Inpatient consult to Social Work/Case Management  Once        Provider:  (Not yet assigned)    Completed GOLD BALDERAS          No new Assessment & Plan notes have been filed under this hospital service since the last note was generated.  Service: Hospital Medicine    Final Active Diagnoses:    Diagnosis Date  Noted POA    Altered mental status [R41.82] 12/22/2021 Yes    Vitamin B12 deficiency [E53.8] 11/30/2021 Yes    Dementia with behavioral disturbance [F03.91] 11/24/2021 Yes    Closed right hip fracture, initial encounter [S72.001A] 11/21/2021 Yes      Problems Resolved During this Admission:    Diagnosis Date Noted Date Resolved POA    PRINCIPAL PROBLEM:  Acute cystitis [N30.00] 12/22/2021 12/27/2021 Yes       Discharged Condition: stable    Disposition: Psychiatric Hospital    Follow Up:   Contact information for follow-up providers     Access [facility] In 4 weeks.    Why: Establish care with primary care physician  Contact information:  [address] [facility] [address]  [phone]                   Contact information for after-discharge care     Destination     [facility] .    Service: Mental Health Hospital Treatment  Contact information:  [address] [address] [facility] [id number] [phone]                           Patient Instructions:      Diet Cardiac     Notify your health care provider if you experience any of the following:  temperature >100.4     Notify your health care provider if you experience any of the following:  persistent nausea and vomiting or diarrhea     Notify your health care provider if you experience any of the following:  severe uncontrolled pain     Notify your health care provider if you experience any of the following:  difficulty breathing or increased cough     Notify your health care provider if you experience any of the following:  persistent dizziness, light-headedness, or visual disturbances     Notify your health care provider if you experience any of the following:  increased confusion or weakness     Activity as tolerated   Order Comments: Right lower extremity weight-bearing as tolerated       Significant Diagnostic Studies:     Microbiology:   Urine Culture    Lab Results   Component Value Date     LABURIN PROTEUS MIRABILIS  >100,000 cfu/ml   (A) 12/22/2021       Pending Diagnostic Studies:     None         Medications:  Reconciled Home Medications:      Medication List      START taking these medications    donepeziL 5 MG tablet  Commonly known as: ARICEPT  Take 1 tablet (5 mg total) by mouth every evening.     EScitalopram oxalate 5 MG Tab  Commonly known as: LEXAPRO  Take 1 tablet (5 mg total) by mouth every evening.     QUEtiapine 100 MG Tab  Commonly known as: SEROQUEL  Take 1 tablet (100 mg total) by mouth every evening.        CONTINUE taking these medications    acetaminophen 500 MG tablet  Commonly known as: TYLENOL  Take 2 tablets (1,000 mg total) by mouth every 8 (eight) hours as needed.     aspirin 81 MG EC tablet  Commonly known as: ECOTRIN  Take 1 tablet (81 mg total) by mouth 2 (two) times a day.     calcium-vitamin D3 500 mg-5 mcg (200 unit) per tablet  Commonly known as: OS-EMELY 500 + D3  Take 2 tablets by mouth 2 (two) times daily.     ferrous gluconate 324 MG tablet  Commonly known as: FERGON  Take 1 tablet (324 mg total) by mouth every 48 hours.     ibuprofen 600 MG tablet  Commonly known as: ADVIL,MOTRIN  Take 1 tablet (600 mg total) by mouth every 6 (six) hours as needed (pain, alternate with tylenol).     polyethylene glycol 17 gram Pwpk  Commonly known as: GLYCOLAX  Take 17 g by mouth once daily.     senna-docusate 8.6-50 mg 8.6-50 mg per tablet  Commonly known as: PERICOLACE  Take 1 tablet by mouth once daily.     simethicone 80 MG chewable tablet  Commonly known as: MYLICON  Take 1 tablet (80 mg total) by mouth 4 (four) times daily as needed for Flatulence.        STOP taking these medications    meclizine 25 mg tablet  Commonly known as: ANTIVERT            Indwelling Lines/Drains at time of discharge:   Lines/Drains/Airways     None                 Time spent on the discharge of patient: 35 minutes         Lv Brantley MD  Department of Hospital Medicine  [facility]  [facility]

## 2021-12-30 NOTE — PT/OT/SLP DISCHARGE
Occupational Therapy Discharge Summary    Keila Romano  MRN: [id number]   Principal Problem: Acute cystitis      Patient Discharged from acute Occupational Therapy on 12/27/21.  Please refer to prior OT note dated 12/27/21 for functional status.    Assessment:      Patient appropriate for care in another setting.    Objective:     GOALS:   Multidisciplinary Problems     Occupational Therapy Goals        Problem: Occupational Therapy Goal    Goal Priority Disciplines Outcome Interventions   Occupational Therapy Goal     OT, PT/OT     Description: Goals to be met by: discharge     Patient will increase functional independence with ADLs by performing:    UE Dressing with Contact Guard Assistance.  LE Dressing with Contact Guard Assistance.  Grooming while standing at sink with Contact Guard Assistance.  Toileting from toilet with Contact Guard Assistance for hygiene and clothing management.   Toilet transfer to toilet with Contact Guard Assistance.                     Reasons for Discontinuation of Therapy Services  Transfer to alternate level of care.      Plan:     Patient Discharged to: [facility] facility     12/30/2021

## 2022-04-06 ENCOUNTER — OFFICE VISIT (OUTPATIENT)
Dept: FAMILY MEDICINE | Facility: CLINIC | Age: 87
End: 2022-04-06
Payer: MEDICARE

## 2022-04-06 VITALS
OXYGEN SATURATION: 97 % | SYSTOLIC BLOOD PRESSURE: 118 MMHG | HEIGHT: 62 IN | HEART RATE: 76 BPM | BODY MASS INDEX: 19.32 KG/M2 | DIASTOLIC BLOOD PRESSURE: 60 MMHG | TEMPERATURE: 98 F | WEIGHT: 105 LBS

## 2022-04-06 DIAGNOSIS — D64.9 ANEMIA, UNSPECIFIED TYPE: ICD-10-CM

## 2022-04-06 DIAGNOSIS — Z87.81 S/P RIGHT HIP FRACTURE: ICD-10-CM

## 2022-04-06 DIAGNOSIS — G30.9 ALZHEIMER'S DISEASE: ICD-10-CM

## 2022-04-06 DIAGNOSIS — Z91.81 AT HIGH RISK FOR FALLS: ICD-10-CM

## 2022-04-06 DIAGNOSIS — Z79.899 ENCOUNTER FOR LONG-TERM (CURRENT) USE OF OTHER MEDICATIONS: Primary | ICD-10-CM

## 2022-04-06 DIAGNOSIS — F02.80 ALZHEIMER'S DISEASE: ICD-10-CM

## 2022-04-06 PROCEDURE — 1125F PR PAIN SEVERITY QUANTIFIED, PAIN PRESENT: ICD-10-PCS | Mod: S$GLB,,, | Performed by: PHYSICIAN ASSISTANT

## 2022-04-06 PROCEDURE — 1125F AMNT PAIN NOTED PAIN PRSNT: CPT | Mod: S$GLB,,, | Performed by: PHYSICIAN ASSISTANT

## 2022-04-06 PROCEDURE — 99215 OFFICE O/P EST HI 40 MIN: CPT | Mod: S$GLB,,, | Performed by: PHYSICIAN ASSISTANT

## 2022-04-06 PROCEDURE — 1159F MED LIST DOCD IN RCRD: CPT | Mod: S$GLB,,, | Performed by: PHYSICIAN ASSISTANT

## 2022-04-06 PROCEDURE — 1160F RVW MEDS BY RX/DR IN RCRD: CPT | Mod: S$GLB,,, | Performed by: PHYSICIAN ASSISTANT

## 2022-04-06 PROCEDURE — 99215 PR OFFICE/OUTPT VISIT, EST, LEVL V, 40-54 MIN: ICD-10-PCS | Mod: S$GLB,,, | Performed by: PHYSICIAN ASSISTANT

## 2022-04-06 PROCEDURE — 1157F ADVNC CARE PLAN IN RCRD: CPT | Mod: S$GLB,,, | Performed by: PHYSICIAN ASSISTANT

## 2022-04-06 PROCEDURE — 1159F PR MEDICATION LIST DOCUMENTED IN MEDICAL RECORD: ICD-10-PCS | Mod: S$GLB,,, | Performed by: PHYSICIAN ASSISTANT

## 2022-04-06 PROCEDURE — 1157F PR ADVANCE CARE PLAN OR EQUIV PRESENT IN MEDICAL RECORD: ICD-10-PCS | Mod: S$GLB,,, | Performed by: PHYSICIAN ASSISTANT

## 2022-04-06 PROCEDURE — 1160F PR REVIEW ALL MEDS BY PRESCRIBER/CLIN PHARMACIST DOCUMENTED: ICD-10-PCS | Mod: S$GLB,,, | Performed by: PHYSICIAN ASSISTANT

## 2022-04-06 RX ORDER — DONEPEZIL HYDROCHLORIDE 5 MG/1
5 TABLET, FILM COATED ORAL 2 TIMES DAILY
Qty: 120 TABLET | Refills: 1 | Status: CANCELLED
Start: 2022-04-06 | End: 2023-04-06

## 2022-04-06 RX ORDER — QUETIAPINE FUMARATE 100 MG/1
100 TABLET, FILM COATED ORAL NIGHTLY
Qty: 90 TABLET | Refills: 1 | Status: CANCELLED
Start: 2022-04-06 | End: 2023-04-06

## 2022-04-06 RX ORDER — ESCITALOPRAM OXALATE 10 MG/1
10 TABLET ORAL DAILY
COMMUNITY
End: 2022-10-31 | Stop reason: SDUPTHER

## 2022-04-06 RX ORDER — DONEPEZIL HYDROCHLORIDE 5 MG/1
5 TABLET, FILM COATED ORAL 2 TIMES DAILY
COMMUNITY
End: 2022-10-31 | Stop reason: SDUPTHER

## 2022-04-07 LAB
ALBUMIN SERPL-MCNC: 3.8 G/DL (ref 3.6–5.1)
ALBUMIN/GLOB SERPL: 1.4 (CALC) (ref 1–2.5)
ALP SERPL-CCNC: 82 U/L (ref 37–153)
ALT SERPL-CCNC: 11 U/L (ref 6–29)
AST SERPL-CCNC: 12 U/L (ref 10–35)
BASOPHILS # BLD AUTO: 41 CELLS/UL (ref 0–200)
BASOPHILS NFR BLD AUTO: 0.9 %
BILIRUB SERPL-MCNC: 0.3 MG/DL (ref 0.2–1.2)
BUN SERPL-MCNC: 26 MG/DL (ref 7–25)
BUN/CREAT SERPL: 33 (CALC) (ref 6–22)
CALCIUM SERPL-MCNC: 9 MG/DL (ref 8.6–10.4)
CHLORIDE SERPL-SCNC: 102 MMOL/L (ref 98–110)
CHOLEST SERPL-MCNC: 242 MG/DL
CHOLEST/HDLC SERPL: 3.2 (CALC)
CO2 SERPL-SCNC: 32 MMOL/L (ref 20–32)
CREAT SERPL-MCNC: 0.78 MG/DL (ref 0.6–0.88)
EOSINOPHIL # BLD AUTO: 171 CELLS/UL (ref 15–500)
EOSINOPHIL NFR BLD AUTO: 3.8 %
ERYTHROCYTE [DISTWIDTH] IN BLOOD BY AUTOMATED COUNT: 14.1 % (ref 11–15)
GLOBULIN SER CALC-MCNC: 2.7 G/DL (CALC) (ref 1.9–3.7)
GLUCOSE SERPL-MCNC: 101 MG/DL (ref 65–139)
HCT VFR BLD AUTO: 33 % (ref 35–45)
HDLC SERPL-MCNC: 76 MG/DL
HGB BLD-MCNC: 10.7 G/DL (ref 11.7–15.5)
LDLC SERPL CALC-MCNC: 146 MG/DL (CALC)
LYMPHOCYTES # BLD AUTO: 1553 CELLS/UL (ref 850–3900)
LYMPHOCYTES NFR BLD AUTO: 34.5 %
MCH RBC QN AUTO: 28.5 PG (ref 27–33)
MCHC RBC AUTO-ENTMCNC: 32.4 G/DL (ref 32–36)
MCV RBC AUTO: 88 FL (ref 80–100)
MONOCYTES # BLD AUTO: 369 CELLS/UL (ref 200–950)
MONOCYTES NFR BLD AUTO: 8.2 %
NEUTROPHILS # BLD AUTO: 2367 CELLS/UL (ref 1500–7800)
NEUTROPHILS NFR BLD AUTO: 52.6 %
NONHDLC SERPL-MCNC: 166 MG/DL (CALC)
PLATELET # BLD AUTO: 297 THOUSAND/UL (ref 140–400)
PMV BLD REES-ECKER: 9 FL (ref 7.5–12.5)
POTASSIUM SERPL-SCNC: 4.1 MMOL/L (ref 3.5–5.3)
PROT SERPL-MCNC: 6.5 G/DL (ref 6.1–8.1)
RBC # BLD AUTO: 3.75 MILLION/UL (ref 3.8–5.1)
SODIUM SERPL-SCNC: 141 MMOL/L (ref 135–146)
TRIGL SERPL-MCNC: 95 MG/DL
TSH SERPL-ACNC: 1.33 MIU/L (ref 0.4–4.5)
WBC # BLD AUTO: 4.5 THOUSAND/UL (ref 3.8–10.8)

## 2022-04-12 ENCOUNTER — OFFICE VISIT (OUTPATIENT)
Dept: FAMILY MEDICINE | Facility: CLINIC | Age: 87
End: 2022-04-12
Payer: MEDICARE

## 2022-04-12 VITALS
OXYGEN SATURATION: 97 % | WEIGHT: 109 LBS | SYSTOLIC BLOOD PRESSURE: 110 MMHG | DIASTOLIC BLOOD PRESSURE: 60 MMHG | HEART RATE: 76 BPM | TEMPERATURE: 98 F | HEIGHT: 62 IN | BODY MASS INDEX: 20.06 KG/M2

## 2022-04-12 DIAGNOSIS — D64.9 ANEMIA, UNSPECIFIED TYPE: ICD-10-CM

## 2022-04-12 DIAGNOSIS — Z87.81 S/P RIGHT HIP FRACTURE: ICD-10-CM

## 2022-04-12 DIAGNOSIS — G30.9 ALZHEIMER'S DISEASE: Primary | ICD-10-CM

## 2022-04-12 DIAGNOSIS — F02.80 ALZHEIMER'S DISEASE: Primary | ICD-10-CM

## 2022-04-12 DIAGNOSIS — E78.2 MIXED HYPERLIPIDEMIA: ICD-10-CM

## 2022-04-12 DIAGNOSIS — Z91.81 AT HIGH RISK FOR FALLS: ICD-10-CM

## 2022-04-12 PROCEDURE — 1157F PR ADVANCE CARE PLAN OR EQUIV PRESENT IN MEDICAL RECORD: ICD-10-PCS | Mod: S$GLB,,, | Performed by: PHYSICIAN ASSISTANT

## 2022-04-12 PROCEDURE — 1160F RVW MEDS BY RX/DR IN RCRD: CPT | Mod: S$GLB,,, | Performed by: PHYSICIAN ASSISTANT

## 2022-04-12 PROCEDURE — 1159F MED LIST DOCD IN RCRD: CPT | Mod: S$GLB,,, | Performed by: PHYSICIAN ASSISTANT

## 2022-04-12 PROCEDURE — 1160F PR REVIEW ALL MEDS BY PRESCRIBER/CLIN PHARMACIST DOCUMENTED: ICD-10-PCS | Mod: S$GLB,,, | Performed by: PHYSICIAN ASSISTANT

## 2022-04-12 PROCEDURE — 99214 OFFICE O/P EST MOD 30 MIN: CPT | Mod: S$GLB,,, | Performed by: PHYSICIAN ASSISTANT

## 2022-04-12 PROCEDURE — 99214 PR OFFICE/OUTPT VISIT, EST, LEVL IV, 30-39 MIN: ICD-10-PCS | Mod: S$GLB,,, | Performed by: PHYSICIAN ASSISTANT

## 2022-04-12 PROCEDURE — 1159F PR MEDICATION LIST DOCUMENTED IN MEDICAL RECORD: ICD-10-PCS | Mod: S$GLB,,, | Performed by: PHYSICIAN ASSISTANT

## 2022-04-12 PROCEDURE — 1157F ADVNC CARE PLAN IN RCRD: CPT | Mod: S$GLB,,, | Performed by: PHYSICIAN ASSISTANT

## 2022-04-12 NOTE — PROGRESS NOTES
SUBJECTIVE:    Patient ID: Keila Romano is a 89 y.o. female.    Chief Complaint: Follow-up (1wk, no bottles// SW)    Pt is a 89 y.o. female who presents today, with her son Mr. Fairchild, for a 1-week follow-up to discuss living arrangements.  She continues to deny assistant living facilities, Home Health Services, or having a daily sitter.  Due to Banking and legal issues, multiple barriers involving this case are present.  Since last visit Mr. Fairchild contacted Mr. Mat Queen, a legal , to get legal advice regarding this challenging situation.  Unfortunately, multiple court cases would be required to settle this case and financial expenses that cannot be met by Ms. Romano's family would have to be undertaken.    Office Visit on 04/06/2022   Component Date Value Ref Range Status    WBC 04/06/2022 4.5  3.8 - 10.8 Thousand/uL Final    RBC 04/06/2022 3.75 (A) 3.80 - 5.10 Million/uL Final    Hemoglobin 04/06/2022 10.7 (A) 11.7 - 15.5 g/dL Final    Hematocrit 04/06/2022 33.0 (A) 35.0 - 45.0 % Final    MCV 04/06/2022 88.0  80.0 - 100.0 fL Final    MCH 04/06/2022 28.5  27.0 - 33.0 pg Final    MCHC 04/06/2022 32.4  32.0 - 36.0 g/dL Final    RDW 04/06/2022 14.1  11.0 - 15.0 % Final    Platelets 04/06/2022 297  140 - 400 Thousand/uL Final    MPV 04/06/2022 9.0  7.5 - 12.5 fL Final    Neutrophils, Abs 04/06/2022 2,367  1,500 - 7,800 cells/uL Final    Lymph # 04/06/2022 1,553  850 - 3,900 cells/uL Final    Mono # 04/06/2022 369  200 - 950 cells/uL Final    Eos # 04/06/2022 171  15 - 500 cells/uL Final    Baso # 04/06/2022 41  0 - 200 cells/uL Final    Neutrophils Relative 04/06/2022 52.6  % Final    Lymph % 04/06/2022 34.5  % Final    Mono % 04/06/2022 8.2  % Final    Eosinophil % 04/06/2022 3.8  % Final    Basophil % 04/06/2022 0.9  % Final    Glucose 04/06/2022 101  65 - 139 mg/dL Final    BUN 04/06/2022 26 (A) 7 - 25 mg/dL Final    Creatinine 04/06/2022 0.78  0.60 - 0.88 mg/dL Final    eGFR  if non  04/06/2022 67  > OR = 60 mL/min/1.73m2 Final    eGFR if African American 04/06/2022 78  > OR = 60 mL/min/1.73m2 Final    BUN/Creatinine Ratio 04/06/2022 33 (A) 6 - 22 (calc) Final    Sodium 04/06/2022 141  135 - 146 mmol/L Final    Potassium 04/06/2022 4.1  3.5 - 5.3 mmol/L Final    Chloride 04/06/2022 102  98 - 110 mmol/L Final    CO2 04/06/2022 32  20 - 32 mmol/L Final    Calcium 04/06/2022 9.0  8.6 - 10.4 mg/dL Final    Total Protein 04/06/2022 6.5  6.1 - 8.1 g/dL Final    Albumin 04/06/2022 3.8  3.6 - 5.1 g/dL Final    Globulin, Total 04/06/2022 2.7  1.9 - 3.7 g/dL (calc) Final    Albumin/Globulin Ratio 04/06/2022 1.4  1.0 - 2.5 (calc) Final    Total Bilirubin 04/06/2022 0.3  0.2 - 1.2 mg/dL Final    Alkaline Phosphatase 04/06/2022 82  37 - 153 U/L Final    AST 04/06/2022 12  10 - 35 U/L Final    ALT 04/06/2022 11  6 - 29 U/L Final    Cholesterol 04/06/2022 242 (A) <200 mg/dL Final    HDL 04/06/2022 76  > OR = 50 mg/dL Final    Triglycerides 04/06/2022 95  <150 mg/dL Final    LDL Cholesterol 04/06/2022 146 (A) mg/dL (calc) Final    HDL/Cholesterol Ratio 04/06/2022 3.2  <5.0 (calc) Final    Non HDL Chol. (LDL+VLDL) 04/06/2022 166 (A) <130 mg/dL (calc) Final    TSH w/reflex to FT4 04/06/2022 1.33  0.40 - 4.50 mIU/L Final   Admission on 12/22/2021, Discharged on 12/27/2021   Component Date Value Ref Range Status    WBC 12/22/2021 4.70  3.90 - 12.70 K/uL Final    RBC 12/22/2021 3.76 (A) 4.00 - 5.40 M/uL Final    Hemoglobin 12/22/2021 11.1 (A) 12.0 - 16.0 g/dL Final    Hematocrit 12/22/2021 35.6 (A) 37.0 - 48.5 % Final    MCV 12/22/2021 95  82 - 98 fL Final    MCH 12/22/2021 29.5  27.0 - 31.0 pg Final    MCHC 12/22/2021 31.2 (A) 32.0 - 36.0 g/dL Final    RDW 12/22/2021 14.4  11.5 - 14.5 % Final    Platelets 12/22/2021 312  150 - 450 K/uL Final    MPV 12/22/2021 8.5 (A) 9.2 - 12.9 fL Final    Immature Granulocytes 12/22/2021 0.4  0.0 - 0.5 % Final    Gran #  (ANC) 12/22/2021 2.9  1.8 - 7.7 K/uL Final    Immature Grans (Abs) 12/22/2021 0.02  0.00 - 0.04 K/uL Final    Lymph # 12/22/2021 1.2  1.0 - 4.8 K/uL Final    Mono # 12/22/2021 0.4  0.3 - 1.0 K/uL Final    Eos # 12/22/2021 0.2  0.0 - 0.5 K/uL Final    Baso # 12/22/2021 0.04  0.00 - 0.20 K/uL Final    nRBC 12/22/2021 0  0 /100 WBC Final    Gran % 12/22/2021 60.5  38.0 - 73.0 % Final    Lymph % 12/22/2021 26.0  18.0 - 48.0 % Final    Mono % 12/22/2021 7.7  4.0 - 15.0 % Final    Eosinophil % 12/22/2021 4.5  0.0 - 8.0 % Final    Basophil % 12/22/2021 0.9  0.0 - 1.9 % Final    Differential Method 12/22/2021 Automated   Final    Sodium 12/22/2021 139  136 - 145 mmol/L Final    Potassium 12/22/2021 4.0  3.5 - 5.1 mmol/L Final    Chloride 12/22/2021 100  95 - 110 mmol/L Final    CO2 12/22/2021 29  23 - 29 mmol/L Final    Glucose 12/22/2021 100  70 - 110 mg/dL Final    BUN 12/22/2021 24 (A) 8 - 23 mg/dL Final    Creatinine 12/22/2021 0.7  0.5 - 1.4 mg/dL Final    Calcium 12/22/2021 8.9  8.7 - 10.5 mg/dL Final    Total Protein 12/22/2021 6.2  6.0 - 8.4 g/dL Final    Albumin 12/22/2021 3.2 (A) 3.5 - 5.2 g/dL Final    Total Bilirubin 12/22/2021 0.7  0.1 - 1.0 mg/dL Final    Alkaline Phosphatase 12/22/2021 86  55 - 135 U/L Final    AST 12/22/2021 24  10 - 40 U/L Final    ALT 12/22/2021 22  10 - 44 U/L Final    Anion Gap 12/22/2021 10  8 - 16 mmol/L Final    eGFR if African American 12/22/2021 >60.0  >60 mL/min/1.73 m^2 Final    eGFR if non African American 12/22/2021 >60.0  >60 mL/min/1.73 m^2 Final    TSH 12/22/2021 2.250  0.340 - 5.600 uIU/mL Final    Benzodiazepines 12/22/2021 Negative  Negative Final    Cocaine (Metab.) 12/22/2021 Negative  Negative Final    Opiate Scrn, Ur 12/22/2021 Negative  Negative Final    Barbiturate Screen, Ur 12/22/2021 Negative  Negative Final    Amphetamine Screen, Ur 12/22/2021 Negative  Negative Final    THC 12/22/2021 Negative  Negative Final    Phencyclidine  12/22/2021 Negative  Negative Final    Creatinine, Urine 12/22/2021 51.0  15.0 - 325.0 mg/dL Final    Toxicology Information 12/22/2021 SEE COMMENT   Final    Alcohol, Serum 12/22/2021 <5  <10 mg/dL Final    Acetaminophen (Tylenol), Serum 12/22/2021 <10.0  10.0 - 20.0 ug/mL Final    Salicylate Lvl 12/22/2021 <4.0 (A) 15.0 - 30.0 mg/dL Final    Specimen UA 12/22/2021 Urine, Clean Catch   Final    Color, UA 12/22/2021 Yellow  Yellow, Straw, [place] Final    Appearance, UA 12/22/2021 Cloudy (A) Clear Final    pH, UA 12/22/2021 >8.0 (A) 5.0 - 8.0 Final    Specific Gravity, UA 12/22/2021 1.015  1.005 - 1.030 Final    Protein, UA 12/22/2021 Trace (A) Negative Final    Glucose, UA 12/22/2021 Negative  Negative Final    Ketones, UA 12/22/2021 Negative  Negative Final    Bilirubin (UA) 12/22/2021 Negative  Negative Final    Occult Blood UA 12/22/2021 Negative  Negative Final    Nitrite, UA 12/22/2021 Positive (A) Negative Final    Urobilinogen, UA 12/22/2021 Negative  Negative EU/dL Final    Leukocytes, UA 12/22/2021 3+ (A) Negative Final    SARS-CoV-2 RNA, Amplification, Qual 12/22/2021 Negative  Negative Final    Ammonia 12/22/2021 13  10 - 50 umol/L Final    RBC, UA 12/22/2021 5 (A) 0 - 4 /hpf Final    WBC, UA 12/22/2021 >100 (A) 0 - 5 /hpf Final    Bacteria 12/22/2021 Occasional  None-Occ /hpf Final    Squam Epithel, UA 12/22/2021 2  /hpf Final    Hyaline Casts, UA 12/22/2021 15 (A) 0-1/lpf /lpf Final    Microscopic Comment 12/22/2021 SEE COMMENT   Final    Urine Culture, Routine 12/22/2021  (A)  Final                    Value:PROTEUS MIRABILIS  >100,000 cfu/ml      Blood Culture, Routine 12/22/2021 No growth after 5 days.   Final    Blood Culture, Routine 12/22/2021 No growth after 5 days.   Final    Lactate (Lactic Acid) 12/22/2021 1.7  0.5 - 1.9 mmol/L Final    Vitamin B-12 12/23/2021 354  210 - 950 pg/mL Final    Folate 12/23/2021 13.7  4.0 - 24.0 ng/mL Final    WBC 12/23/2021 5.44   3.90 - 12.70 K/uL Final    RBC 12/23/2021 3.43 (A) 4.00 - 5.40 M/uL Final    Hemoglobin 12/23/2021 10.3 (A) 12.0 - 16.0 g/dL Final    Hematocrit 12/23/2021 32.6 (A) 37.0 - 48.5 % Final    MCV 12/23/2021 95  82 - 98 fL Final    MCH 12/23/2021 30.0  27.0 - 31.0 pg Final    MCHC 12/23/2021 31.6 (A) 32.0 - 36.0 g/dL Final    RDW 12/23/2021 14.6 (A) 11.5 - 14.5 % Final    Platelets 12/23/2021 296  150 - 450 K/uL Final    MPV 12/23/2021 8.6 (A) 9.2 - 12.9 fL Final    Immature Granulocytes 12/23/2021 0.4  0.0 - 0.5 % Final    Gran # (ANC) 12/23/2021 3.1  1.8 - 7.7 K/uL Final    Immature Grans (Abs) 12/23/2021 0.02  0.00 - 0.04 K/uL Final    Lymph # 12/23/2021 1.5  1.0 - 4.8 K/uL Final    Mono # 12/23/2021 0.5  0.3 - 1.0 K/uL Final    Eos # 12/23/2021 0.3  0.0 - 0.5 K/uL Final    Baso # 12/23/2021 0.04  0.00 - 0.20 K/uL Final    nRBC 12/23/2021 0  0 /100 WBC Final    Gran % 12/23/2021 57.0  38.0 - 73.0 % Final    Lymph % 12/23/2021 28.3  18.0 - 48.0 % Final    Mono % 12/23/2021 9.0  4.0 - 15.0 % Final    Eosinophil % 12/23/2021 4.6  0.0 - 8.0 % Final    Basophil % 12/23/2021 0.7  0.0 - 1.9 % Final    Differential Method 12/23/2021 Automated   Final    Sodium 12/23/2021 139  136 - 145 mmol/L Final    Potassium 12/23/2021 4.5  3.5 - 5.1 mmol/L Final    Chloride 12/23/2021 100  95 - 110 mmol/L Final    CO2 12/23/2021 30 (A) 23 - 29 mmol/L Final    Glucose 12/23/2021 93  70 - 110 mg/dL Final    BUN 12/23/2021 25 (A) 8 - 23 mg/dL Final    Creatinine 12/23/2021 0.6  0.5 - 1.4 mg/dL Final    Calcium 12/23/2021 8.7  8.7 - 10.5 mg/dL Final    Total Protein 12/23/2021 6.2  6.0 - 8.4 g/dL Final    Albumin 12/23/2021 3.1 (A) 3.5 - 5.2 g/dL Final    Total Bilirubin 12/23/2021 0.6  0.1 - 1.0 mg/dL Final    Alkaline Phosphatase 12/23/2021 84  55 - 135 U/L Final    AST 12/23/2021 21  10 - 40 U/L Final    ALT 12/23/2021 22  10 - 44 U/L Final    Anion Gap 12/23/2021 9  8 - 16 mmol/L Final    eGFR if   12/23/2021 >60.0  >60 mL/min/1.73 m^2 Final    eGFR if non African American 12/23/2021 >60.0  >60 mL/min/1.73 m^2 Final    SARS-CoV-2 RNA, Amplification, Qual 12/27/2021 Negative  Negative Final   Admission on 11/29/2021, Discharged on 12/22/2021   Component Date Value Ref Range Status    Sodium 11/30/2021 137  136 - 145 mmol/L Final    Potassium 11/30/2021 4.3  3.5 - 5.1 mmol/L Final    Chloride 11/30/2021 101  95 - 110 mmol/L Final    CO2 11/30/2021 24  23 - 29 mmol/L Final    Glucose 11/30/2021 92  70 - 110 mg/dL Final    BUN 11/30/2021 29 (A) 8 - 23 mg/dL Final    Creatinine 11/30/2021 0.7  0.5 - 1.4 mg/dL Final    Calcium 11/30/2021 8.8  8.7 - 10.5 mg/dL Final    Total Protein 11/30/2021 6.2  6.0 - 8.4 g/dL Final    Albumin 11/30/2021 2.9 (A) 3.5 - 5.2 g/dL Final    Total Bilirubin 11/30/2021 0.7  0.1 - 1.0 mg/dL Final    Alkaline Phosphatase 11/30/2021 75  55 - 135 U/L Final    AST 11/30/2021 29  10 - 40 U/L Final    ALT 11/30/2021 36  10 - 44 U/L Final    Anion Gap 11/30/2021 12  8 - 16 mmol/L Final    eGFR if African American 11/30/2021 >60  >60 mL/min/1.73 m^2 Final    eGFR if non African American 11/30/2021 >60  >60 mL/min/1.73 m^2 Final    WBC 11/30/2021 10.11  3.90 - 12.70 K/uL Final    RBC 11/30/2021 3.24 (A) 4.00 - 5.40 M/uL Final    Hemoglobin 11/30/2021 9.5 (A) 12.0 - 16.0 g/dL Final    Hematocrit 11/30/2021 30.0 (A) 37.0 - 48.5 % Final    MCV 11/30/2021 93  82 - 98 fL Final    MCH 11/30/2021 29.3  27.0 - 31.0 pg Final    MCHC 11/30/2021 31.7 (A) 32.0 - 36.0 g/dL Final    RDW 11/30/2021 13.5  11.5 - 14.5 % Final    Platelets 11/30/2021 428  150 - 450 K/uL Final    MPV 11/30/2021 8.6 (A) 9.2 - 12.9 fL Final    Immature Granulocytes 11/30/2021 0.5  0.0 - 0.5 % Final    Gran # (ANC) 11/30/2021 7.8 (A) 1.8 - 7.7 K/uL Final    Immature Grans (Abs) 11/30/2021 0.05 (A) 0.00 - 0.04 K/uL Final    Lymph # 11/30/2021 1.4  1.0 - 4.8 K/uL Final    Mono #  11/30/2021 0.6  0.3 - 1.0 K/uL Final    Eos # 11/30/2021 0.2  0.0 - 0.5 K/uL Final    Baso # 11/30/2021 0.06  0.00 - 0.20 K/uL Final    nRBC 11/30/2021 0  0 /100 WBC Final    Gran % 11/30/2021 77.2 (A) 38.0 - 73.0 % Final    Lymph % 11/30/2021 13.5 (A) 18.0 - 48.0 % Final    Mono % 11/30/2021 6.3  4.0 - 15.0 % Final    Eosinophil % 11/30/2021 1.9  0.0 - 8.0 % Final    Basophil % 11/30/2021 0.6  0.0 - 1.9 % Final    Differential Method 11/30/2021 Automated   Final    Ammonia 11/30/2021 32  10 - 50 umol/L Final    Vitamin B-12 11/30/2021 183 (A) 210 - 950 pg/mL Final    SARS-CoV2 (COVID-19) Qualitative P* 11/30/2021 Not Detected  Not Detected Final    TSH 12/01/2021 1.530  0.400 - 4.000 uIU/mL Final    RPR 12/01/2021 Non-reactive  Non-reactive Final    Vit D, 25-Hydroxy 12/01/2021 30  30 - 96 ng/mL Final    Folate 12/01/2021 8.1  4.0 - 24.0 ng/mL Final    SARS-COV-2- Cycle Number 11/30/2021 N/A   Final    WBC 12/06/2021 7.41  3.90 - 12.70 K/uL Final    RBC 12/06/2021 3.21 (A) 4.00 - 5.40 M/uL Final    Hemoglobin 12/06/2021 9.4 (A) 12.0 - 16.0 g/dL Final    Hematocrit 12/06/2021 30.8 (A) 37.0 - 48.5 % Final    MCV 12/06/2021 96  82 - 98 fL Final    MCH 12/06/2021 29.3  27.0 - 31.0 pg Final    MCHC 12/06/2021 30.5 (A) 32.0 - 36.0 g/dL Final    RDW 12/06/2021 13.8  11.5 - 14.5 % Final    Platelets 12/06/2021 497 (A) 150 - 450 K/uL Final    MPV 12/06/2021 8.6 (A) 9.2 - 12.9 fL Final    Immature Granulocytes 12/06/2021 0.4  0.0 - 0.5 % Final    Gran # (ANC) 12/06/2021 4.9  1.8 - 7.7 K/uL Final    Immature Grans (Abs) 12/06/2021 0.03  0.00 - 0.04 K/uL Final    Lymph # 12/06/2021 1.5  1.0 - 4.8 K/uL Final    Mono # 12/06/2021 0.5  0.3 - 1.0 K/uL Final    Eos # 12/06/2021 0.5  0.0 - 0.5 K/uL Final    Baso # 12/06/2021 0.09  0.00 - 0.20 K/uL Final    nRBC 12/06/2021 0  0 /100 WBC Final    Gran % 12/06/2021 65.5  38.0 - 73.0 % Final    Lymph % 12/06/2021 20.1  18.0 - 48.0 % Final    Mono %  12/06/2021 6.7  4.0 - 15.0 % Final    Eosinophil % 12/06/2021 6.1  0.0 - 8.0 % Final    Basophil % 12/06/2021 1.2  0.0 - 1.9 % Final    Differential Method 12/06/2021 Automated   Final    Sodium 12/06/2021 140  136 - 145 mmol/L Final    Potassium 12/06/2021 4.5  3.5 - 5.1 mmol/L Final    Chloride 12/06/2021 103  95 - 110 mmol/L Final    CO2 12/06/2021 27  23 - 29 mmol/L Final    Glucose 12/06/2021 85  70 - 110 mg/dL Final    BUN 12/06/2021 32 (A) 8 - 23 mg/dL Final    Creatinine 12/06/2021 0.7  0.5 - 1.4 mg/dL Final    Calcium 12/06/2021 8.7  8.7 - 10.5 mg/dL Final    Total Protein 12/06/2021 6.4  6.0 - 8.4 g/dL Final    Albumin 12/06/2021 3.0 (A) 3.5 - 5.2 g/dL Final    Total Bilirubin 12/06/2021 0.5  0.1 - 1.0 mg/dL Final    Alkaline Phosphatase 12/06/2021 103  55 - 135 U/L Final    AST 12/06/2021 26  10 - 40 U/L Final    ALT 12/06/2021 19  10 - 44 U/L Final    Anion Gap 12/06/2021 10  8 - 16 mmol/L Final    eGFR if African American 12/06/2021 >60  >60 mL/min/1.73 m^2 Final    eGFR if non African American 12/06/2021 >60  >60 mL/min/1.73 m^2 Final   Admission on 11/21/2021, Discharged on 11/29/2021   Component Date Value Ref Range Status    WBC 11/21/2021 7.82  3.90 - 12.70 K/uL Final    RBC 11/21/2021 3.51 (A) 4.00 - 5.40 M/uL Final    Hemoglobin 11/21/2021 10.5 (A) 12.0 - 16.0 g/dL Final    Hematocrit 11/21/2021 33.3 (A) 37.0 - 48.5 % Final    MCV 11/21/2021 95  82 - 98 fL Final    MCH 11/21/2021 29.9  27.0 - 31.0 pg Final    MCHC 11/21/2021 31.5 (A) 32.0 - 36.0 g/dL Final    RDW 11/21/2021 13.2  11.5 - 14.5 % Final    Platelets 11/21/2021 241  150 - 450 K/uL Final    MPV 11/21/2021 8.4 (A) 9.2 - 12.9 fL Final    Immature Granulocytes 11/21/2021 0.4  0.0 - 0.5 % Final    Gran # (ANC) 11/21/2021 7.1  1.8 - 7.7 K/uL Final    Immature Grans (Abs) 11/21/2021 0.03  0.00 - 0.04 K/uL Final    Lymph # 11/21/2021 0.4 (A) 1.0 - 4.8 K/uL Final    Mono # 11/21/2021 0.3  0.3 - 1.0 K/uL Final     Eos # 11/21/2021 0.0  0.0 - 0.5 K/uL Final    Baso # 11/21/2021 0.02  0.00 - 0.20 K/uL Final    nRBC 11/21/2021 0  0 /100 WBC Final    Gran % 11/21/2021 90.6 (A) 38.0 - 73.0 % Final    Lymph % 11/21/2021 5.0 (A) 18.0 - 48.0 % Final    Mono % 11/21/2021 3.7 (A) 4.0 - 15.0 % Final    Eosinophil % 11/21/2021 0.0  0.0 - 8.0 % Final    Basophil % 11/21/2021 0.3  0.0 - 1.9 % Final    Differential Method 11/21/2021 Automated   Final    Sodium 11/21/2021 140  136 - 145 mmol/L Final    Potassium 11/21/2021 3.9  3.5 - 5.1 mmol/L Final    Chloride 11/21/2021 104  95 - 110 mmol/L Final    CO2 11/21/2021 23  23 - 29 mmol/L Final    Glucose 11/21/2021 135 (A) 70 - 110 mg/dL Final    BUN 11/21/2021 18  8 - 23 mg/dL Final    Creatinine 11/21/2021 0.8  0.5 - 1.4 mg/dL Final    Calcium 11/21/2021 8.6 (A) 8.7 - 10.5 mg/dL Final    Anion Gap 11/21/2021 13  8 - 16 mmol/L Final    eGFR if African American 11/21/2021 >60  >60 mL/min/1.73 m^2 Final    eGFR if non African American 11/21/2021 >60  >60 mL/min/1.73 m^2 Final    SARS-CoV-2 RNA, Amplification, Qual 11/21/2021 Negative  Negative Final    Iron 11/21/2021 15 (A) 30 - 160 ug/dL Final    Transferrin 11/21/2021 243  200 - 375 mg/dL Final    TIBC 11/21/2021 360  250 - 450 ug/dL Final    Saturated Iron 11/21/2021 4 (A) 20 - 50 % Final    Ferritin 11/21/2021 112  20.0 - 300.0 ng/mL Final    WBC 11/22/2021 6.86  3.90 - 12.70 K/uL Final    RBC 11/22/2021 3.20 (A) 4.00 - 5.40 M/uL Final    Hemoglobin 11/22/2021 9.5 (A) 12.0 - 16.0 g/dL Final    Hematocrit 11/22/2021 30.2 (A) 37.0 - 48.5 % Final    MCV 11/22/2021 94  82 - 98 fL Final    MCH 11/22/2021 29.7  27.0 - 31.0 pg Final    MCHC 11/22/2021 31.5 (A) 32.0 - 36.0 g/dL Final    RDW 11/22/2021 13.3  11.5 - 14.5 % Final    Platelets 11/22/2021 239  150 - 450 K/uL Final    MPV 11/22/2021 8.8 (A) 9.2 - 12.9 fL Final    Immature Granulocytes 11/22/2021 0.3  0.0 - 0.5 % Final    Gran # (ANC) 11/22/2021  5.2  1.8 - 7.7 K/uL Final    Immature Grans (Abs) 11/22/2021 0.02  0.00 - 0.04 K/uL Final    Lymph # 11/22/2021 1.0  1.0 - 4.8 K/uL Final    Mono # 11/22/2021 0.5  0.3 - 1.0 K/uL Final    Eos # 11/22/2021 0.1  0.0 - 0.5 K/uL Final    Baso # 11/22/2021 0.02  0.00 - 0.20 K/uL Final    nRBC 11/22/2021 0  0 /100 WBC Final    Gran % 11/22/2021 76.2 (A) 38.0 - 73.0 % Final    Lymph % 11/22/2021 14.6 (A) 18.0 - 48.0 % Final    Mono % 11/22/2021 7.6  4.0 - 15.0 % Final    Eosinophil % 11/22/2021 1.0  0.0 - 8.0 % Final    Basophil % 11/22/2021 0.3  0.0 - 1.9 % Final    Differential Method 11/22/2021 Automated   Final    WBC 11/23/2021 6.59  3.90 - 12.70 K/uL Final    RBC 11/23/2021 3.03 (A) 4.00 - 5.40 M/uL Final    Hemoglobin 11/23/2021 8.9 (A) 12.0 - 16.0 g/dL Final    Hematocrit 11/23/2021 29.0 (A) 37.0 - 48.5 % Final    MCV 11/23/2021 96  82 - 98 fL Final    MCH 11/23/2021 29.4  27.0 - 31.0 pg Final    MCHC 11/23/2021 30.7 (A) 32.0 - 36.0 g/dL Final    RDW 11/23/2021 13.3  11.5 - 14.5 % Final    Platelets 11/23/2021 213  150 - 450 K/uL Final    MPV 11/23/2021 8.7 (A) 9.2 - 12.9 fL Final    Immature Granulocytes 11/23/2021 0.5  0.0 - 0.5 % Final    Gran # (ANC) 11/23/2021 5.2  1.8 - 7.7 K/uL Final    Immature Grans (Abs) 11/23/2021 0.03  0.00 - 0.04 K/uL Final    Lymph # 11/23/2021 0.7 (A) 1.0 - 4.8 K/uL Final    Mono # 11/23/2021 0.5  0.3 - 1.0 K/uL Final    Eos # 11/23/2021 0.1  0.0 - 0.5 K/uL Final    Baso # 11/23/2021 0.03  0.00 - 0.20 K/uL Final    nRBC 11/23/2021 0  0 /100 WBC Final    Gran % 11/23/2021 78.4 (A) 38.0 - 73.0 % Final    Lymph % 11/23/2021 11.2 (A) 18.0 - 48.0 % Final    Mono % 11/23/2021 7.6  4.0 - 15.0 % Final    Eosinophil % 11/23/2021 1.8  0.0 - 8.0 % Final    Basophil % 11/23/2021 0.5  0.0 - 1.9 % Final    Differential Method 11/23/2021 Automated   Final    WBC 11/25/2021 6.49  3.90 - 12.70 K/uL Final    RBC 11/25/2021 3.07 (A) 4.00 - 5.40 M/uL Final     Hemoglobin 11/25/2021 9.1 (A) 12.0 - 16.0 g/dL Final    Hematocrit 11/25/2021 28.5 (A) 37.0 - 48.5 % Final    MCV 11/25/2021 93  82 - 98 fL Final    MCH 11/25/2021 29.6  27.0 - 31.0 pg Final    MCHC 11/25/2021 31.9 (A) 32.0 - 36.0 g/dL Final    RDW 11/25/2021 13.2  11.5 - 14.5 % Final    Platelets 11/25/2021 271  150 - 450 K/uL Final    MPV 11/25/2021 8.7 (A) 9.2 - 12.9 fL Final    Immature Granulocytes 11/25/2021 0.6 (A) 0.0 - 0.5 % Final    Gran # (ANC) 11/25/2021 4.6  1.8 - 7.7 K/uL Final    Immature Grans (Abs) 11/25/2021 0.04  0.00 - 0.04 K/uL Final    Lymph # 11/25/2021 1.1  1.0 - 4.8 K/uL Final    Mono # 11/25/2021 0.5  0.3 - 1.0 K/uL Final    Eos # 11/25/2021 0.3  0.0 - 0.5 K/uL Final    Baso # 11/25/2021 0.03  0.00 - 0.20 K/uL Final    nRBC 11/25/2021 0  0 /100 WBC Final    Gran % 11/25/2021 70.7  38.0 - 73.0 % Final    Lymph % 11/25/2021 16.6 (A) 18.0 - 48.0 % Final    Mono % 11/25/2021 7.4  4.0 - 15.0 % Final    Eosinophil % 11/25/2021 4.2  0.0 - 8.0 % Final    Basophil % 11/25/2021 0.5  0.0 - 1.9 % Final    Differential Method 11/25/2021 Automated   Final    TB Induration 48 - 72 hr read 11/26/2021 0  mm Final    WBC 11/29/2021 6.26  3.90 - 12.70 K/uL Final    RBC 11/29/2021 3.10 (A) 4.00 - 5.40 M/uL Final    Hemoglobin 11/29/2021 9.2 (A) 12.0 - 16.0 g/dL Final    Hematocrit 11/29/2021 29.0 (A) 37.0 - 48.5 % Final    MCV 11/29/2021 94  82 - 98 fL Final    MCH 11/29/2021 29.7  27.0 - 31.0 pg Final    MCHC 11/29/2021 31.7 (A) 32.0 - 36.0 g/dL Final    RDW 11/29/2021 13.4  11.5 - 14.5 % Final    Platelets 11/29/2021 347  150 - 450 K/uL Final    MPV 11/29/2021 8.3 (A) 9.2 - 12.9 fL Final    Immature Granulocytes 11/29/2021 0.5  0.0 - 0.5 % Final    Gran # (ANC) 11/29/2021 3.8  1.8 - 7.7 K/uL Final    Immature Grans (Abs) 11/29/2021 0.03  0.00 - 0.04 K/uL Final    Lymph # 11/29/2021 1.6  1.0 - 4.8 K/uL Final    Mono # 11/29/2021 0.7  0.3 - 1.0 K/uL Final    Eos #  11/29/2021 0.2  0.0 - 0.5 K/uL Final    Baso # 11/29/2021 0.05  0.00 - 0.20 K/uL Final    nRBC 11/29/2021 0  0 /100 WBC Final    Gran % 11/29/2021 60.3  38.0 - 73.0 % Final    Lymph % 11/29/2021 24.8  18.0 - 48.0 % Final    Mono % 11/29/2021 10.4  4.0 - 15.0 % Final    Eosinophil % 11/29/2021 3.2  0.0 - 8.0 % Final    Basophil % 11/29/2021 0.8  0.0 - 1.9 % Final    Differential Method 11/29/2021 Automated   Final    Sodium 11/29/2021 138  136 - 145 mmol/L Final    Potassium 11/29/2021 4.3  3.5 - 5.1 mmol/L Final    Chloride 11/29/2021 99  95 - 110 mmol/L Final    CO2 11/29/2021 27  23 - 29 mmol/L Final    Glucose 11/29/2021 104  70 - 110 mg/dL Final    BUN 11/29/2021 26 (A) 8 - 23 mg/dL Final    Creatinine 11/29/2021 0.7  0.5 - 1.4 mg/dL Final    Calcium 11/29/2021 9.4  8.7 - 10.5 mg/dL Final    Anion Gap 11/29/2021 12  8 - 16 mmol/L Final    eGFR if African American 11/29/2021 >60  >60 mL/min/1.73 m^2 Final    eGFR if non African American 11/29/2021 >60  >60 mL/min/1.73 m^2 Final       Past Medical History:   Diagnosis Date    Dementia     Hip fracture     L     Past Surgical History:   Procedure Laterality Date    PERCUTANEOUS PINNING OF HIP Right 11/22/2021    Procedure: PINNING, HIP, PERCUTANEOUS;  Surgeon: Navneet Rios MD;  Location: [facility];  Service: Orthopedics;  Laterality: Right;     Family History   Problem Relation Age of Onset    Heart disease Neg Hx        Marital Status: Single  Alcohol History:  reports no history of alcohol use.  Tobacco History:  reports that she has never smoked. She has never used smokeless tobacco.  Drug History:  reports no history of drug use.    Health Maintenance Topics with due status: Not Due       Topic Last Completion Date    COVID-19 Vaccine 04/01/2022    Lipid Panel 04/06/2022     Immunization History   Administered Date(s) Administered    COVID-19, MRNA, LN-S, PF (Pfizer) ([place]) 02/10/2022, 04/01/2022    PPD Test 11/24/2021       Review  of patient's allergies indicates:  No Known Allergies    Current Outpatient Medications:     aspirin (ECOTRIN) 81 MG EC tablet, Take 1 tablet (81 mg total) by mouth 2 (two) times a day. (Patient not taking: Reported on 4/6/2022), Disp: 60 tablet, Rfl: 0    calcium-vitamin D3 (OS-EMELY 500 + D3) 500 mg(1,250mg) -200 unit per tablet, Take 2 tablets by mouth 2 (two) times daily. (Patient not taking: Reported on 4/6/2022), Disp: 120 tablet, Rfl: 11    donepeziL (ARICEPT) 5 MG tablet, Take 5 mg by mouth 2 (two) times a day., Disp: , Rfl:     EScitalopram oxalate (LEXAPRO) 10 MG tablet, Take 10 mg by mouth once daily., Disp: , Rfl:     ferrous gluconate (FERGON) 324 MG tablet, Take 1 tablet (324 mg total) by mouth every 48 hours. (Patient not taking: Reported on 4/6/2022), Disp: , Rfl:     polyethylene glycol (GLYCOLAX) 17 gram PwPk, Take 17 g by mouth once daily. (Patient not taking: Reported on 4/6/2022), Disp: 30 packet, Rfl: 0    QUEtiapine (SEROQUEL) 100 MG Tab, Take 1 tablet (100 mg total) by mouth every evening. (Patient not taking: Reported on 4/6/2022), Disp: 30 tablet, Rfl: 11    senna-docusate 8.6-50 mg (PERICOLACE) 8.6-50 mg per tablet, Take 1 tablet by mouth once daily. (Patient not taking: Reported on 4/6/2022), Disp: , Rfl:     Review of Systems   Constitutional: Negative for activity change, appetite change, chills, fatigue and fever.   Respiratory: Negative for cough, shortness of breath and wheezing.    Cardiovascular: Negative for chest pain, palpitations and leg swelling.   Gastrointestinal: Negative for abdominal pain, constipation, diarrhea, nausea and vomiting.   Genitourinary: Negative for difficulty urinating, dysuria and frequency.   Musculoskeletal: Negative for arthralgias and myalgias.   Neurological: Negative for dizziness, syncope, weakness, light-headedness and headaches.   Psychiatric/Behavioral: Negative for behavioral problems.          Objective:      Vitals:    04/12/22 1603  "  BP: 110/60   Pulse: 76   Temp: 97.9 °F (36.6 °C)   SpO2: 97%   Weight: 49.4 kg (109 lb)   Height: 5' 2" (1.575 m)     Physical Exam  Vitals and nursing note reviewed.   Constitutional:       General: She is not in acute distress.     Appearance: Normal appearance. She is not ill-appearing, toxic-appearing or diaphoretic.      Comments: Thin, underweight WF sitting erect in an office chair in no acute distress talking about unrelated topics randomly throughout the interview.   HENT:      Head: Normocephalic and atraumatic.      Right Ear: Tympanic membrane, ear canal and external ear normal. There is no impacted cerumen.      Left Ear: Tympanic membrane, ear canal and external ear normal. There is no impacted cerumen.      Nose: Nose normal. No rhinorrhea.      Mouth/Throat:      Mouth: Mucous membranes are moist.      Pharynx: Oropharynx is clear.   Eyes:      General: No scleral icterus.     Extraocular Movements: Extraocular movements intact.      Conjunctiva/sclera: Conjunctivae normal.      Pupils: Pupils are equal, round, and reactive to light.   Cardiovascular:      Rate and Rhythm: Normal rate and regular rhythm.      Pulses: Normal pulses.      Heart sounds: Murmur (3/6 systolic aortic murmur noted.) heard.     No friction rub. No gallop.   Pulmonary:      Effort: Pulmonary effort is normal. No respiratory distress.      Breath sounds: Normal breath sounds. No wheezing, rhonchi or rales.   Abdominal:      General: There is no distension.      Palpations: Abdomen is soft.      Tenderness: There is no abdominal tenderness. There is no guarding or rebound.   Musculoskeletal:      Cervical back: Normal range of motion and neck supple. No tenderness.      Right lower leg: No edema.      Left lower leg: No edema.      Comments: Tenderness to deep palpation over the right trochanter bursa.   Lymphadenopathy:      Cervical: No cervical adenopathy.   Skin:     General: Skin is warm and dry.      Findings: No rash. "   Neurological:      Mental Status: She is alert. Mental status is at baseline. She is disoriented.      Gait: Gait abnormal (Ambulates with the assistance of a cane.  Gait is unbalanced with a short, shuffling gait.).   Psychiatric:         Speech: Speech is tangential.         Behavior: Behavior is slowed. Behavior is cooperative.         Cognition and Memory: Cognition is impaired. Memory is impaired. She exhibits impaired recent memory and impaired remote memory.      Comments: MMSE performed in office today: 20/30  Today, patient was making statements that do not concur with previous states on last visit.  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           Assessment:       1. Alzheimer's disease    2. S/P right hip fracture    3. Anemia, unspecified type    4. At high risk for falls    5. Mixed hyperlipidemia           Plan:       Alzheimer's disease  Due to significant mental decline and no caregiver to manage medications, prescription medications will not be prescribed in worry of patient accidentally abusing meds (antipsychotics, ect.), which increases her risk for possible OD, falls, ect..  Reasoning for withholding medications was explained to family and they state their understanding and are in agreeance.  I explained to both Ms. Romano and Mr. Fairchild the severity of her mental condition/declined and the severe risk including falls, trauma, malnutrition, and even death of living alone. In my medical opinion, I advise higher level care/assisted living facilities for Ms. Romano's safety. Both Ms. Romano and Mr. Barba voiced their understanding, but she refused assisted living, home health, or daily sitters. She elects to return home.  Patient provided living will today which was scanned into her chart.  Will keep close follow-up care with patient and continued to express the need for assisted living in hopes she reconsiders.    S/P right hip  fracture    Anemia, unspecified type  Comments:  H&H at baseline.    At high risk for falls    Mixed hyperlipidemia  Comments:  Lab work reviewed with patient today.  Cholesterol level mildly elevated.  Will not initiate therapy at this time due to decreased mental state and fear of      Follow up in about 3 months (around 7/12/2022) for Alzheimer's.        4/12/2022 Michael Rivas PA-C

## 2022-04-27 ENCOUNTER — TELEPHONE (OUTPATIENT)
Dept: FAMILY MEDICINE | Facility: CLINIC | Age: 87
End: 2022-04-27

## 2022-04-27 DIAGNOSIS — F02.80 ALZHEIMER'S DISEASE: Primary | ICD-10-CM

## 2022-04-27 DIAGNOSIS — G30.9 ALZHEIMER'S DISEASE: Primary | ICD-10-CM

## 2022-04-27 NOTE — TELEPHONE ENCOUNTER
----- Message from Kerrie Nathan sent at 4/27/2022  4:29 PM CDT -----   4:24   Sadie with [facility]. She is asking for an order for a  alex. She needs help with some long time planing and community services. You can fax the order or call Sadie  phone #  [phone] or Fax # [phone]  [facility]

## 2022-05-19 ENCOUNTER — EXTERNAL HOME HEALTH (OUTPATIENT)
Dept: HOME HEALTH SERVICES | Facility: HOSPITAL | Age: 87
End: 2022-05-19
Payer: MEDICARE

## 2022-05-21 ENCOUNTER — EXTERNAL HOME HEALTH (OUTPATIENT)
Dept: HOME HEALTH SERVICES | Facility: HOSPITAL | Age: 87
End: 2022-05-21

## 2022-05-25 ENCOUNTER — TELEPHONE (OUTPATIENT)
Dept: FAMILY MEDICINE | Facility: CLINIC | Age: 87
End: 2022-05-25

## 2022-05-25 NOTE — TELEPHONE ENCOUNTER
Spoke to Home health nurse. Pt is not symptomatic. Not taking any BP medications. Eating and drinking normal. Denies GI issues such as nausea/vomiting/diarrea. Per porsche monitor if she is not symptomatic this may be a normal BP for pt. See BP has been on the lower side in the past. If symptoms develop she will need to be seen.  nurse susannah voiced understanding.

## 2022-05-25 NOTE — TELEPHONE ENCOUNTER
----- Message from Asha Guardado sent at 5/25/2022  1:30 PM CDT -----  Silas- susannah with  is calling pt bp is  102/43. She doubled check it. Pule 66 oxygen 95. She would like a call back   [phone]

## 2022-07-12 ENCOUNTER — TELEPHONE (OUTPATIENT)
Dept: FAMILY MEDICINE | Facility: CLINIC | Age: 87
End: 2022-07-12

## 2022-07-12 NOTE — TELEPHONE ENCOUNTER
----- Message from Jasmina Deleon sent at 7/12/2022 10:33 AM CDT -----  Yenny with [facility] called and stated that she need the most recent office notes please fax to [phone] and if there are any questions please give her a call at [phone]

## 2022-10-31 ENCOUNTER — OFFICE VISIT (OUTPATIENT)
Dept: FAMILY MEDICINE | Facility: CLINIC | Age: 87
End: 2022-10-31
Payer: MEDICARE

## 2022-10-31 VITALS
HEIGHT: 62 IN | BODY MASS INDEX: 17.3 KG/M2 | DIASTOLIC BLOOD PRESSURE: 78 MMHG | HEART RATE: 68 BPM | TEMPERATURE: 98 F | WEIGHT: 94 LBS | SYSTOLIC BLOOD PRESSURE: 110 MMHG | OXYGEN SATURATION: 98 %

## 2022-10-31 DIAGNOSIS — D64.9 ANEMIA, UNSPECIFIED TYPE: ICD-10-CM

## 2022-10-31 DIAGNOSIS — Z23 NEED FOR INFLUENZA VACCINATION: ICD-10-CM

## 2022-10-31 DIAGNOSIS — F02.80 ALZHEIMER'S DISEASE: Primary | ICD-10-CM

## 2022-10-31 DIAGNOSIS — Z91.81 AT HIGH RISK FOR FALLS: ICD-10-CM

## 2022-10-31 DIAGNOSIS — Z11.1 SCREENING-PULMONARY TB: ICD-10-CM

## 2022-10-31 DIAGNOSIS — E78.2 MIXED HYPERLIPIDEMIA: ICD-10-CM

## 2022-10-31 DIAGNOSIS — G30.9 ALZHEIMER'S DISEASE: Primary | ICD-10-CM

## 2022-10-31 PROCEDURE — 99214 PR OFFICE/OUTPT VISIT, EST, LEVL IV, 30-39 MIN: ICD-10-PCS | Mod: 25,S$GLB,, | Performed by: PHYSICIAN ASSISTANT

## 2022-10-31 PROCEDURE — 1101F PT FALLS ASSESS-DOCD LE1/YR: CPT | Mod: CPTII,S$GLB,, | Performed by: PHYSICIAN ASSISTANT

## 2022-10-31 PROCEDURE — 90662 IIV NO PRSV INCREASED AG IM: CPT | Mod: S$GLB,,, | Performed by: PHYSICIAN ASSISTANT

## 2022-10-31 PROCEDURE — 3288F FALL RISK ASSESSMENT DOCD: CPT | Mod: CPTII,S$GLB,, | Performed by: PHYSICIAN ASSISTANT

## 2022-10-31 PROCEDURE — 1160F PR REVIEW ALL MEDS BY PRESCRIBER/CLIN PHARMACIST DOCUMENTED: ICD-10-PCS | Mod: CPTII,S$GLB,, | Performed by: PHYSICIAN ASSISTANT

## 2022-10-31 PROCEDURE — G0008 ADMIN INFLUENZA VIRUS VAC: HCPCS | Mod: S$GLB,,, | Performed by: PHYSICIAN ASSISTANT

## 2022-10-31 PROCEDURE — 1101F PR PT FALLS ASSESS DOC 0-1 FALLS W/OUT INJ PAST YR: ICD-10-PCS | Mod: CPTII,S$GLB,, | Performed by: PHYSICIAN ASSISTANT

## 2022-10-31 PROCEDURE — 90662 FLU VACCINE - QUADRIVALENT - HIGH DOSE (65+) PRESERVATIVE FREE IM: ICD-10-PCS | Mod: S$GLB,,, | Performed by: PHYSICIAN ASSISTANT

## 2022-10-31 PROCEDURE — 1160F RVW MEDS BY RX/DR IN RCRD: CPT | Mod: CPTII,S$GLB,, | Performed by: PHYSICIAN ASSISTANT

## 2022-10-31 PROCEDURE — G0008 FLU VACCINE - QUADRIVALENT - HIGH DOSE (65+) PRESERVATIVE FREE IM: ICD-10-PCS | Mod: S$GLB,,, | Performed by: PHYSICIAN ASSISTANT

## 2022-10-31 PROCEDURE — 1157F ADVNC CARE PLAN IN RCRD: CPT | Mod: CPTII,S$GLB,, | Performed by: PHYSICIAN ASSISTANT

## 2022-10-31 PROCEDURE — 99214 OFFICE O/P EST MOD 30 MIN: CPT | Mod: 25,S$GLB,, | Performed by: PHYSICIAN ASSISTANT

## 2022-10-31 PROCEDURE — 3288F PR FALLS RISK ASSESSMENT DOCUMENTED: ICD-10-PCS | Mod: CPTII,S$GLB,, | Performed by: PHYSICIAN ASSISTANT

## 2022-10-31 PROCEDURE — 1159F PR MEDICATION LIST DOCUMENTED IN MEDICAL RECORD: ICD-10-PCS | Mod: CPTII,S$GLB,, | Performed by: PHYSICIAN ASSISTANT

## 2022-10-31 PROCEDURE — 1157F PR ADVANCE CARE PLAN OR EQUIV PRESENT IN MEDICAL RECORD: ICD-10-PCS | Mod: CPTII,S$GLB,, | Performed by: PHYSICIAN ASSISTANT

## 2022-10-31 PROCEDURE — 86580 TB INTRADERMAL TEST: CPT | Mod: S$GLB,,, | Performed by: PHYSICIAN ASSISTANT

## 2022-10-31 PROCEDURE — 86580 POCT TB SKIN TEST: ICD-10-PCS | Mod: S$GLB,,, | Performed by: PHYSICIAN ASSISTANT

## 2022-10-31 PROCEDURE — 1159F MED LIST DOCD IN RCRD: CPT | Mod: CPTII,S$GLB,, | Performed by: PHYSICIAN ASSISTANT

## 2022-10-31 RX ORDER — ESCITALOPRAM OXALATE 10 MG/1
10 TABLET ORAL NIGHTLY
Qty: 90 TABLET | Refills: 1 | Status: SHIPPED | OUTPATIENT
Start: 2022-10-31 | End: 2022-11-16 | Stop reason: SDUPTHER

## 2022-10-31 RX ORDER — QUETIAPINE FUMARATE 100 MG/1
100 TABLET, FILM COATED ORAL NIGHTLY
Qty: 90 TABLET | Refills: 1
Start: 2022-10-31 | End: 2022-12-12 | Stop reason: SDUPTHER

## 2022-10-31 RX ORDER — ASPIRIN 81 MG/1
81 TABLET ORAL DAILY
Qty: 90 TABLET | Refills: 1
Start: 2022-10-31 | End: 2022-11-16 | Stop reason: SDUPTHER

## 2022-10-31 RX ORDER — DONEPEZIL HYDROCHLORIDE 5 MG/1
5 TABLET, FILM COATED ORAL DAILY
Qty: 90 TABLET | Refills: 1 | Status: SHIPPED | OUTPATIENT
Start: 2022-10-31 | End: 2022-11-16 | Stop reason: SDUPTHER

## 2022-10-31 NOTE — PROGRESS NOTES
SUBJECTIVE:    Patient ID: Keila Romano is a 89 y.o. female.    Chief Complaint: Dementia (No bottles/ Pt is c/o of dementia getting worse/ Decline flu, pna/BG)    Pt is a 89 y.o. female who presents today with her son, Mr. Barba, requesting placement into [facility] secondary to her progressing dementia. She is gravely disabled secondary to her cognitive status and admission into an assisted living facility is highly recommended. She currently lives alone, and secondary to legal circumstances, her admission into a assisted living facility was delayed.  Her son has now gained power of , and feels as though it is in her best interest to be admitted into a facility with long-term care.  In the past, she was admitted in [facility] regarding her Alzheimer's dementia, was prescribed numerous medication, but never initiated her treatment regimen.  Overall she is without complaints today, but her son reports she has been experiencing severe mental declined accompanied with strange behaviors.  Her personal hygiene is lacking and she goes extended periods of time without bathing.  Her nutrition status is inadequate and she has been found eating cat food at times.  A significant weight loss is noted since last visit - 15 lb - current weight 94 lbs.    No visits with results within 6 Month(s) from this visit.   Latest known visit with results is:   Office Visit on 04/06/2022   Component Date Value Ref Range Status    WBC 04/06/2022 4.5  3.8 - 10.8 Thousand/uL Final    RBC 04/06/2022 3.75 (L)  3.80 - 5.10 Million/uL Final    Hemoglobin 04/06/2022 10.7 (L)  11.7 - 15.5 g/dL Final    Hematocrit 04/06/2022 33.0 (L)  35.0 - 45.0 % Final    MCV 04/06/2022 88.0  80.0 - 100.0 fL Final    MCH 04/06/2022 28.5  27.0 - 33.0 pg Final    MCHC 04/06/2022 32.4  32.0 - 36.0 g/dL Final    RDW 04/06/2022 14.1  11.0 - 15.0 % Final    Platelets 04/06/2022 297  140 - 400 Thousand/uL Final    MPV 04/06/2022  9.0  7.5 - 12.5 fL Final    Neutrophils, Abs 04/06/2022 2,367  1,500 - 7,800 cells/uL Final    Lymph # 04/06/2022 1,553  850 - 3,900 cells/uL Final    Mono # 04/06/2022 369  200 - 950 cells/uL Final    Eos # 04/06/2022 171  15 - 500 cells/uL Final    Baso # 04/06/2022 41  0 - 200 cells/uL Final    Neutrophils Relative 04/06/2022 52.6  % Final    Lymph % 04/06/2022 34.5  % Final    Mono % 04/06/2022 8.2  % Final    Eosinophil % 04/06/2022 3.8  % Final    Basophil % 04/06/2022 0.9  % Final    Glucose 04/06/2022 101  65 - 139 mg/dL Final    BUN 04/06/2022 26 (H)  7 - 25 mg/dL Final    Creatinine 04/06/2022 0.78  0.60 - 0.88 mg/dL Final    eGFR if non African American 04/06/2022 67  > OR = 60 mL/min/1.73m2 Final    eGFR if African American 04/06/2022 78  > OR = 60 mL/min/1.73m2 Final    BUN/Creatinine Ratio 04/06/2022 33 (H)  6 - 22 (calc) Final    Sodium 04/06/2022 141  135 - 146 mmol/L Final    Potassium 04/06/2022 4.1  3.5 - 5.3 mmol/L Final    Chloride 04/06/2022 102  98 - 110 mmol/L Final    CO2 04/06/2022 32  20 - 32 mmol/L Final    Calcium 04/06/2022 9.0  8.6 - 10.4 mg/dL Final    Total Protein 04/06/2022 6.5  6.1 - 8.1 g/dL Final    Albumin 04/06/2022 3.8  3.6 - 5.1 g/dL Final    Globulin, Total 04/06/2022 2.7  1.9 - 3.7 g/dL (calc) Final    Albumin/Globulin Ratio 04/06/2022 1.4  1.0 - 2.5 (calc) Final    Total Bilirubin 04/06/2022 0.3  0.2 - 1.2 mg/dL Final    Alkaline Phosphatase 04/06/2022 82  37 - 153 U/L Final    AST 04/06/2022 12  10 - 35 U/L Final    ALT 04/06/2022 11  6 - 29 U/L Final    Cholesterol 04/06/2022 242 (H)  <200 mg/dL Final    HDL 04/06/2022 76  > OR = 50 mg/dL Final    Triglycerides 04/06/2022 95  <150 mg/dL Final    LDL Cholesterol 04/06/2022 146 (H)  mg/dL (calc) Final    HDL/Cholesterol Ratio 04/06/2022 3.2  <5.0 (calc) Final    Non HDL Chol. (LDL+VLDL) 04/06/2022 166 (H)  <130 mg/dL (calc) Final    TSH w/reflex to FT4 04/06/2022 1.33  0.40 - 4.50 mIU/L Final       Past Medical History:    Diagnosis Date    Dementia     Hip fracture     L     Past Surgical History:   Procedure Laterality Date    PERCUTANEOUS PINNING OF HIP Right 11/22/2021    Procedure: PINNING, HIP, PERCUTANEOUS;  Surgeon: Navneet Rios MD;  Location: [facility];  Service: Orthopedics;  Laterality: Right;     Family History   Problem Relation Age of Onset    Heart disease Neg Hx        Marital Status: Single  Alcohol History:  reports no history of alcohol use.  Tobacco History:  reports that she has never smoked. She has never used smokeless tobacco.  Drug History:  reports no history of drug use.    Health Maintenance Topics with due status: Not Due       Topic Last Completion Date    Lipid Panel 04/06/2022     Immunization History   Administered Date(s) Administered    COVID-19, MRNA, LN-S, PF (Pfizer) (Gray Cap) 02/10/2022, 04/01/2022    Influenza - Quadrivalent - High Dose - PF (65 years and older) 10/31/2022    PPD Test 11/24/2021, 10/31/2022       Review of patient's allergies indicates:  No Known Allergies    Current Outpatient Medications:     aspirin (ECOTRIN) 81 MG EC tablet, Take 1 tablet (81 mg total) by mouth once daily., Disp: 90 tablet, Rfl: 1    donepeziL (ARICEPT) 5 MG tablet, Take 1 tablet (5 mg total) by mouth once daily., Disp: 90 tablet, Rfl: 1    EScitalopram oxalate (LEXAPRO) 10 MG tablet, Take 1 tablet (10 mg total) by mouth every evening., Disp: 90 tablet, Rfl: 1    QUEtiapine (SEROQUEL) 100 MG Tab, Take 1 tablet (100 mg total) by mouth every evening., Disp: 90 tablet, Rfl: 1    Review of Systems   Constitutional:  Negative for activity change, appetite change, fatigue and fever.   HENT:  Negative for sore throat.    Respiratory:  Negative for cough, shortness of breath and wheezing.    Cardiovascular:  Negative for chest pain, palpitations and leg swelling.   Gastrointestinal:  Negative for abdominal pain, constipation, diarrhea, nausea and vomiting.   Genitourinary:  Negative for decreased urine volume,  "difficulty urinating, dysuria, frequency and hematuria.   Musculoskeletal:  Negative for arthralgias and myalgias.   Neurological:  Negative for dizziness, syncope, weakness, light-headedness and headaches.   Psychiatric/Behavioral:  Positive for confusion. Negative for behavioral problems and hallucinations.         Objective:      Vitals:    10/31/22 1154   BP: 110/78   Pulse: 68   Temp: 97.7 °F (36.5 °C)   TempSrc: Oral   SpO2: 98%   Weight: 42.6 kg (94 lb)   Height: 5' 2" (1.575 m)     Physical Exam  Vitals and nursing note reviewed.   Constitutional:       General: She is not in acute distress.     Appearance: Normal appearance. She is not ill-appearing, toxic-appearing or diaphoretic.      Comments: Thin, underweight WF sitting erect in an office chair in no acute distress talking about unrelated topics randomly throughout the interview.   HENT:      Head: Normocephalic and atraumatic.      Right Ear: Tympanic membrane, ear canal and external ear normal. There is no impacted cerumen.      Left Ear: Tympanic membrane, ear canal and external ear normal. There is no impacted cerumen.      Nose: Nose normal. No rhinorrhea.      Mouth/Throat:      Mouth: Mucous membranes are moist.      Pharynx: Oropharynx is clear.   Eyes:      General: No scleral icterus.     Extraocular Movements: Extraocular movements intact.      Conjunctiva/sclera: Conjunctivae normal.      Pupils: Pupils are equal, round, and reactive to light.   Cardiovascular:      Rate and Rhythm: Normal rate and regular rhythm.      Pulses: Normal pulses.      Heart sounds: Murmur (3/6 systolic aortic murmur noted.) heard.     No friction rub. No gallop.   Pulmonary:      Effort: Pulmonary effort is normal. No respiratory distress.      Breath sounds: Normal breath sounds. No wheezing, rhonchi or rales.   Abdominal:      General: There is no distension.      Palpations: Abdomen is soft.      Tenderness: There is no abdominal tenderness. There is no " guarding or rebound.   Musculoskeletal:      Cervical back: Normal range of motion and neck supple. No tenderness.      Right lower leg: No edema.      Left lower leg: No edema.   Lymphadenopathy:      Cervical: No cervical adenopathy.   Skin:     General: Skin is warm and dry.      Findings: No rash.   Neurological:      Mental Status: She is alert. Mental status is at baseline. She is disoriented.      Motor: No weakness.      Gait: Gait abnormal (Gait is unbalanced with a short, shuffling gait.).   Psychiatric:         Speech: Speech is tangential.         Behavior: Behavior is slowed. Behavior is cooperative.         Cognition and Memory: Cognition is impaired. Memory is impaired. She exhibits impaired recent memory and impaired remote memory.      Comments: Throughout the interview, patient was making statements of working 40 hour days, 7 days a week.          Assessment:       1. Alzheimer's disease    2. Need for influenza vaccination    3. Screening-pulmonary TB    4. Mixed hyperlipidemia    5. Anemia, unspecified type    6. At high risk for falls           Plan:       Alzheimer's disease  Based on my professional medical observation, patient is gravely disabled and needs long-term assistant care secondary to her progressing dementia.  Patient's primary caregiver/family are in agreeance and she will be admitted to [facility].  Restart medications that were originally initiated after her discharge from [facility] clinic, being that she will have assistance with medication administration in her living facility.    -     EScitalopram oxalate (LEXAPRO) 10 MG tablet; Take 1 tablet (10 mg total) by mouth every evening.  Dispense: 90 tablet; Refill: 1  -     QUEtiapine (SEROQUEL) 100 MG Tab; Take 1 tablet (100 mg total) by mouth every evening.  Dispense: 90 tablet; Refill: 1  -     donepeziL (ARICEPT) 5 MG tablet; Take 1 tablet (5 mg total) by  mouth once daily.  Dispense: 90 tablet; Refill: 1    Need for influenza vaccination  Influenza vaccine administered today.  -     Influenza - Quadrivalent - High Dose (65+) (PF) (IM)    Screening-pulmonary TB  TB skin test administered today for admission to [facility].  TB test will be read in 48-72 hours.  -     POCT TB Skin Test    Mixed hyperlipidemia    Anemia, unspecified type  Stable on previous lab work.  -     aspirin (ECOTRIN) 81 MG EC tablet; Take 1 tablet (81 mg total) by mouth once daily.  Dispense: 90 tablet; Refill: 1    At high risk for falls  Recommended starting PT via home health today, but patient/caregiver declined at this time.    Will consider re-discussing on follow up visit.     Follow up in about 3 months (around 1/31/2023) for Alzheimer's dementia.        10/31/2022 Michael Rivas PA-C

## 2022-11-02 ENCOUNTER — CLINICAL SUPPORT (OUTPATIENT)
Dept: FAMILY MEDICINE | Facility: CLINIC | Age: 87
End: 2022-11-02
Payer: MEDICARE

## 2022-11-02 LAB
TB INDURATION - 48 HR READ: 0 MM
TB INDURATION - 72 HR READ: 0 MM
TB SKIN TEST - 48 HR READ: NEGATIVE
TB SKIN TEST - 72 HR READ: NEGATIVE

## 2022-11-02 NOTE — LETTER
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 The results of this test are as follows:    PPD Test Value       11/2/2022         Tb Skin Test Negative     Negative    TB Induration(mm) 0     0        Current PPD Immunization     Name Date Dose VIS Date Route    PPD Test 10/31/2022 0.1 mL N/A Intradermal    Site: Right arm    Given By: Sarah Jordan MA    : Other     Lot: [id number]    Expiration Date: 12/31/2023    PPD Test 11/24/2021 5 Units N/A Intradermal    Site: Left arm    Given By: Lanette Hunter LPN    : [employer]    Lot: [id number]    Expiration Date: 2/3/2023          This test was negative for tuberculosis exposure per current Centers for Disease Control guidelines.    A chest x-ray was not required.    If you have any questions or concerns, please don't hesitate to call.    Sincerely,  Ly Sultana LPN          No name on file

## 2022-11-03 ENCOUNTER — TELEPHONE (OUTPATIENT)
Dept: FAMILY MEDICINE | Facility: CLINIC | Age: 87
End: 2022-11-03

## 2022-11-03 NOTE — TELEPHONE ENCOUNTER
Jerri Rivas [place] Staff  Caller: Unspecified (Today,  8:08 AM)  Pt brought in paperwork they need another MD to write a statement and sign the POA.  Any question please call Gurinder Pérez [phone].   has seen her also. Gave to Ly.       Given to Dr. Pleitez.

## 2022-11-07 ENCOUNTER — TELEPHONE (OUTPATIENT)
Dept: FAMILY MEDICINE | Facility: CLINIC | Age: 87
End: 2022-11-07

## 2022-11-07 NOTE — TELEPHONE ENCOUNTER
Per Michael, Letter that Dr. Pleitez wrote has his signature. Letter and covid testing order are available to . GIANFRANCO

## 2022-11-07 NOTE — TELEPHONE ENCOUNTER
----- Message from Jerri Samayoa sent at 11/7/2022  8:12 AM CST -----  Regarding: paperwork  Pt's Nephew needs a order for Covid 19 testing. Also if you would write a similar letter that Dr. Pleitez wrote or add his signature to the current letter.  Gave paperwork to Ly. Call when done [phone].

## 2022-11-16 DIAGNOSIS — D64.9 ANEMIA, UNSPECIFIED TYPE: ICD-10-CM

## 2022-11-16 DIAGNOSIS — F02.80 ALZHEIMER'S DISEASE: ICD-10-CM

## 2022-11-16 DIAGNOSIS — G30.9 ALZHEIMER'S DISEASE: ICD-10-CM

## 2022-11-16 RX ORDER — DONEPEZIL HYDROCHLORIDE 5 MG/1
5 TABLET, FILM COATED ORAL DAILY
Qty: 90 TABLET | Refills: 1 | Status: SHIPPED | OUTPATIENT
Start: 2022-11-16 | End: 2023-06-19 | Stop reason: SDUPTHER

## 2022-11-16 RX ORDER — ESCITALOPRAM OXALATE 10 MG/1
10 TABLET ORAL NIGHTLY
Qty: 90 TABLET | Refills: 1 | Status: SHIPPED | OUTPATIENT
Start: 2022-11-16 | End: 2023-06-19 | Stop reason: SDUPTHER

## 2022-11-16 RX ORDER — ASPIRIN 81 MG/1
81 TABLET ORAL DAILY
Qty: 90 TABLET | Refills: 1
Start: 2022-11-16 | End: 2022-12-22 | Stop reason: SDUPTHER

## 2022-12-06 ENCOUNTER — OUTSIDE PLACE OF SERVICE (OUTPATIENT)
Dept: FAMILY MEDICINE | Facility: CLINIC | Age: 87
End: 2022-12-06
Payer: MEDICARE

## 2022-12-06 DIAGNOSIS — Z74.1 NEED FOR ASSISTANCE WITH PERSONAL CARE: ICD-10-CM

## 2022-12-06 DIAGNOSIS — F03.918 DEMENTIA WITH BEHAVIORAL DISTURBANCE: Primary | ICD-10-CM

## 2022-12-06 DIAGNOSIS — B02.9 HERPES ZOSTER: ICD-10-CM

## 2022-12-06 PROCEDURE — 99348 HOME/RES VST EST LOW MDM 30: CPT | Mod: S$GLB,,, | Performed by: FAMILY MEDICINE

## 2022-12-06 PROCEDURE — 1159F PR MEDICATION LIST DOCUMENTED IN MEDICAL RECORD: ICD-10-PCS | Mod: CPTII,S$GLB,, | Performed by: FAMILY MEDICINE

## 2022-12-06 PROCEDURE — 1160F PR REVIEW ALL MEDS BY PRESCRIBER/CLIN PHARMACIST DOCUMENTED: ICD-10-PCS | Mod: CPTII,S$GLB,, | Performed by: FAMILY MEDICINE

## 2022-12-06 PROCEDURE — 1160F RVW MEDS BY RX/DR IN RCRD: CPT | Mod: CPTII,S$GLB,, | Performed by: FAMILY MEDICINE

## 2022-12-06 PROCEDURE — 99348 PR HOME VISIT,ESTAB PATIENT,LEVEL II: ICD-10-PCS | Mod: S$GLB,,, | Performed by: FAMILY MEDICINE

## 2022-12-06 PROCEDURE — 1159F MED LIST DOCD IN RCRD: CPT | Mod: CPTII,S$GLB,, | Performed by: FAMILY MEDICINE

## 2022-12-07 DIAGNOSIS — B02.9 HERPES ZOSTER WITHOUT COMPLICATION: Primary | ICD-10-CM

## 2022-12-07 RX ORDER — VALACYCLOVIR HYDROCHLORIDE 1 G/1
1000 TABLET, FILM COATED ORAL 3 TIMES DAILY
Qty: 30 TABLET | Refills: 0 | Status: SHIPPED | OUTPATIENT
Start: 2022-12-07 | End: 2023-12-07

## 2022-12-12 DIAGNOSIS — G30.9 ALZHEIMER'S DISEASE: ICD-10-CM

## 2022-12-12 DIAGNOSIS — F02.80 ALZHEIMER'S DISEASE: ICD-10-CM

## 2022-12-12 RX ORDER — QUETIAPINE FUMARATE 100 MG/1
100 TABLET, FILM COATED ORAL NIGHTLY
Qty: 90 TABLET | Refills: 1
Start: 2022-12-12 | End: 2022-12-15 | Stop reason: SDUPTHER

## 2022-12-12 NOTE — TELEPHONE ENCOUNTER
----- Message from Asha Guardado sent at 12/12/2022  9:55 AM CST -----  Fred hernadez with [facility] is calling for refill on quitalepine once a day at bedtime   [phone]

## 2022-12-15 DIAGNOSIS — F02.80 ALZHEIMER'S DISEASE: ICD-10-CM

## 2022-12-15 DIAGNOSIS — G30.9 ALZHEIMER'S DISEASE: ICD-10-CM

## 2022-12-15 RX ORDER — QUETIAPINE FUMARATE 100 MG/1
100 TABLET, FILM COATED ORAL NIGHTLY
Qty: 90 TABLET | Refills: 1 | Status: SHIPPED | OUTPATIENT
Start: 2022-12-15 | End: 2023-05-30 | Stop reason: SDUPTHER

## 2022-12-15 NOTE — TELEPHONE ENCOUNTER
----- Message from Shira Almeida MA sent at 12/15/2022  3:10 PM CST -----  Regarding: refill  Maik calling from [facility] for seroquel 100mg.   [phone]

## 2022-12-21 ENCOUNTER — TELEPHONE (OUTPATIENT)
Dept: FAMILY MEDICINE | Facility: CLINIC | Age: 87
End: 2022-12-21

## 2022-12-21 NOTE — TELEPHONE ENCOUNTER
----- Message from Zenia Varner MA sent at 12/21/2022 11:49 AM CST -----    ----- Message -----  From: Jasmina Deleon  Sent: 12/21/2022  11:49 AM CST  To: Dallin Garzon with [facility] called and stated that she need to speak to the doctor or the nurse about the patient prescription directions please give her a call at [phone]

## 2022-12-21 NOTE — TELEPHONE ENCOUNTER
Ryann with [facility] calling about valtrex rx states there was no end date. Faxed ryann Pleitez note stating valtrex directions.

## 2022-12-22 DIAGNOSIS — D64.9 ANEMIA, UNSPECIFIED TYPE: ICD-10-CM

## 2022-12-22 RX ORDER — ASPIRIN 81 MG/1
81 TABLET ORAL DAILY
Qty: 90 TABLET | Refills: 1 | Status: SHIPPED | OUTPATIENT
Start: 2022-12-22 | End: 2023-06-19 | Stop reason: SDUPTHER

## 2022-12-22 NOTE — TELEPHONE ENCOUNTER
----- Message from Marisela Carcamo sent at 12/22/2022  4:09 PM CST -----  -3:44PM    Refill on: aspirin   Need New Rx   [facility]     [phone]

## 2023-01-05 ENCOUNTER — TELEPHONE (OUTPATIENT)
Dept: FAMILY MEDICINE | Facility: CLINIC | Age: 88
End: 2023-01-05

## 2023-02-10 ENCOUNTER — OUTSIDE PLACE OF SERVICE (OUTPATIENT)
Dept: FAMILY MEDICINE | Facility: CLINIC | Age: 88
End: 2023-02-10
Payer: MEDICARE

## 2023-02-10 DIAGNOSIS — F02.80 ALZHEIMER'S DEMENTIA: Primary | ICD-10-CM

## 2023-02-10 DIAGNOSIS — Z87.81 HISTORY OF FRACTURE OF RIGHT HIP: ICD-10-CM

## 2023-02-10 DIAGNOSIS — G30.9 ALZHEIMER'S DEMENTIA: Primary | ICD-10-CM

## 2023-02-10 DIAGNOSIS — Z74.1 NEED FOR ASSISTANCE WITH PERSONAL CARE: ICD-10-CM

## 2023-02-10 PROCEDURE — 99348 PR HOME VISIT,ESTAB PATIENT,LEVEL II: ICD-10-PCS | Mod: S$GLB,,, | Performed by: FAMILY MEDICINE

## 2023-02-10 PROCEDURE — 1160F PR REVIEW ALL MEDS BY PRESCRIBER/CLIN PHARMACIST DOCUMENTED: ICD-10-PCS | Mod: CPTII,S$GLB,, | Performed by: FAMILY MEDICINE

## 2023-02-10 PROCEDURE — 1159F MED LIST DOCD IN RCRD: CPT | Mod: CPTII,S$GLB,, | Performed by: FAMILY MEDICINE

## 2023-02-10 PROCEDURE — 99348 HOME/RES VST EST LOW MDM 30: CPT | Mod: S$GLB,,, | Performed by: FAMILY MEDICINE

## 2023-02-10 PROCEDURE — 1160F RVW MEDS BY RX/DR IN RCRD: CPT | Mod: CPTII,S$GLB,, | Performed by: FAMILY MEDICINE

## 2023-02-10 PROCEDURE — 1159F PR MEDICATION LIST DOCUMENTED IN MEDICAL RECORD: ICD-10-PCS | Mod: CPTII,S$GLB,, | Performed by: FAMILY MEDICINE

## 2023-03-14 DIAGNOSIS — N30.00 ACUTE CYSTITIS WITHOUT HEMATURIA: Primary | ICD-10-CM

## 2023-03-14 RX ORDER — NITROFURANTOIN 25; 75 MG/1; MG/1
100 CAPSULE ORAL 2 TIMES DAILY
Qty: 14 CAPSULE | Refills: 0 | Status: SHIPPED | OUTPATIENT
Start: 2023-03-14

## 2023-04-19 ENCOUNTER — TELEPHONE (OUTPATIENT)
Dept: FAMILY MEDICINE | Facility: CLINIC | Age: 88
End: 2023-04-19

## 2023-04-19 RX ORDER — ACETAMINOPHEN 500 MG
1 TABLET ORAL NIGHTLY
COMMUNITY

## 2023-04-19 NOTE — TELEPHONE ENCOUNTER
[facility] requesting for PRN orders for Melatonin. Per Dr. Maik reyez for PRN orders. Med added to pt chart.

## 2023-05-30 DIAGNOSIS — G30.9 ALZHEIMER'S DISEASE: ICD-10-CM

## 2023-05-30 DIAGNOSIS — F02.80 ALZHEIMER'S DISEASE: ICD-10-CM

## 2023-05-30 RX ORDER — QUETIAPINE FUMARATE 100 MG/1
100 TABLET, FILM COATED ORAL NIGHTLY
Qty: 90 TABLET | Refills: 1 | Status: SHIPPED | OUTPATIENT
Start: 2023-05-30 | End: 2023-06-19 | Stop reason: SDUPTHER

## 2023-05-30 NOTE — TELEPHONE ENCOUNTER
----- Message from Jasmina Deleon sent at 5/30/2023 11:02 AM CDT -----  VM 05/30/23 @ 10:19 :[facility] called and stated that the patient need a refill of her quetiapine  . If any questions please give her a call at [phone]

## 2023-06-08 ENCOUNTER — OUTSIDE PLACE OF SERVICE (OUTPATIENT)
Dept: FAMILY MEDICINE | Facility: CLINIC | Age: 88
End: 2023-06-08
Payer: MEDICARE

## 2023-06-08 DIAGNOSIS — M17.9 OA (OSTEOARTHRITIS) OF KNEE: ICD-10-CM

## 2023-06-08 DIAGNOSIS — F03.918 DEMENTIA WITH BEHAVIORAL DISTURBANCE: Primary | ICD-10-CM

## 2023-06-08 DIAGNOSIS — I10 HTN (HYPERTENSION): ICD-10-CM

## 2023-06-08 PROCEDURE — 99349 HOME/RES VST EST MOD MDM 40: CPT | Mod: S$GLB,,, | Performed by: FAMILY MEDICINE

## 2023-06-08 PROCEDURE — 1159F MED LIST DOCD IN RCRD: CPT | Mod: CPTII,S$GLB,, | Performed by: FAMILY MEDICINE

## 2023-06-08 PROCEDURE — 99349 PR HOME VISIT,ESTAB PATIENT,LEVEL III: ICD-10-PCS | Mod: S$GLB,,, | Performed by: FAMILY MEDICINE

## 2023-06-08 PROCEDURE — 1160F PR REVIEW ALL MEDS BY PRESCRIBER/CLIN PHARMACIST DOCUMENTED: ICD-10-PCS | Mod: CPTII,S$GLB,, | Performed by: FAMILY MEDICINE

## 2023-06-08 PROCEDURE — 1159F PR MEDICATION LIST DOCUMENTED IN MEDICAL RECORD: ICD-10-PCS | Mod: CPTII,S$GLB,, | Performed by: FAMILY MEDICINE

## 2023-06-08 PROCEDURE — 1160F RVW MEDS BY RX/DR IN RCRD: CPT | Mod: CPTII,S$GLB,, | Performed by: FAMILY MEDICINE

## 2023-06-09 DIAGNOSIS — M17.11 PRIMARY OSTEOARTHRITIS OF RIGHT KNEE: Primary | ICD-10-CM

## 2023-06-09 RX ORDER — DICLOFENAC SODIUM 10 MG/G
2 GEL TOPICAL DAILY
Qty: 100 G | Refills: 3 | Status: SHIPPED | OUTPATIENT
Start: 2023-06-09

## 2023-06-19 DIAGNOSIS — F02.80 ALZHEIMER'S DISEASE: ICD-10-CM

## 2023-06-19 DIAGNOSIS — D64.9 ANEMIA, UNSPECIFIED TYPE: ICD-10-CM

## 2023-06-19 DIAGNOSIS — G30.9 ALZHEIMER'S DISEASE: ICD-10-CM

## 2023-06-19 RX ORDER — ASPIRIN 81 MG/1
81 TABLET ORAL DAILY
Qty: 90 TABLET | Refills: 1 | Status: SHIPPED | OUTPATIENT
Start: 2023-06-19 | End: 2023-06-21 | Stop reason: SDUPTHER

## 2023-06-19 RX ORDER — ESCITALOPRAM OXALATE 10 MG/1
10 TABLET ORAL NIGHTLY
Qty: 90 TABLET | Refills: 1 | Status: SHIPPED | OUTPATIENT
Start: 2023-06-19 | End: 2023-06-21 | Stop reason: SDUPTHER

## 2023-06-19 RX ORDER — DONEPEZIL HYDROCHLORIDE 5 MG/1
5 TABLET, FILM COATED ORAL DAILY
Qty: 90 TABLET | Refills: 1 | Status: SHIPPED | OUTPATIENT
Start: 2023-06-19 | End: 2023-06-21 | Stop reason: SDUPTHER

## 2023-06-19 RX ORDER — QUETIAPINE FUMARATE 100 MG/1
100 TABLET, FILM COATED ORAL NIGHTLY
Qty: 90 TABLET | Refills: 1 | Status: SHIPPED | OUTPATIENT
Start: 2023-06-19 | End: 2023-06-21 | Stop reason: SDUPTHER

## 2023-06-21 DIAGNOSIS — G30.9 ALZHEIMER'S DISEASE: ICD-10-CM

## 2023-06-21 DIAGNOSIS — F02.80 ALZHEIMER'S DISEASE: ICD-10-CM

## 2023-06-21 DIAGNOSIS — D64.9 ANEMIA, UNSPECIFIED TYPE: ICD-10-CM

## 2023-06-21 RX ORDER — ESCITALOPRAM OXALATE 10 MG/1
10 TABLET ORAL NIGHTLY
Qty: 90 TABLET | Refills: 1 | Status: SHIPPED | OUTPATIENT
Start: 2023-06-21 | End: 2024-02-07 | Stop reason: SDUPTHER

## 2023-06-21 RX ORDER — ASPIRIN 81 MG/1
81 TABLET ORAL DAILY
Qty: 90 TABLET | Refills: 1 | Status: SHIPPED | OUTPATIENT
Start: 2023-06-21 | End: 2024-06-20

## 2023-06-21 RX ORDER — QUETIAPINE FUMARATE 100 MG/1
100 TABLET, FILM COATED ORAL NIGHTLY
Qty: 90 TABLET | Refills: 1 | Status: SHIPPED | OUTPATIENT
Start: 2023-06-21 | End: 2024-06-20

## 2023-06-21 RX ORDER — DONEPEZIL HYDROCHLORIDE 5 MG/1
5 TABLET, FILM COATED ORAL DAILY
Qty: 90 TABLET | Refills: 1 | Status: SHIPPED | OUTPATIENT
Start: 2023-06-21

## 2023-06-21 NOTE — TELEPHONE ENCOUNTER
[facility]   requesting   Lexapro 10mg   Donepaezil  Aspirin  Seroquel.

## 2023-06-27 ENCOUNTER — TELEPHONE (OUTPATIENT)
Dept: FAMILY MEDICINE | Facility: CLINIC | Age: 88
End: 2023-06-27

## 2023-06-29 ENCOUNTER — PATIENT MESSAGE (OUTPATIENT)
Dept: FAMILY MEDICINE | Facility: CLINIC | Age: 88
End: 2023-06-29

## 2023-07-11 ENCOUNTER — TELEPHONE (OUTPATIENT)
Dept: FAMILY MEDICINE | Facility: CLINIC | Age: 88
End: 2023-07-11

## 2023-07-12 NOTE — TELEPHONE ENCOUNTER
Spoke with Becca at [place]. Advised the covid test they they routinely do on pt, insurance is not covering. Pt has a bill for 600.00. states she will look into this a figure out what is going on

## 2023-07-19 DIAGNOSIS — R26.9 GAIT DISORDER: Primary | ICD-10-CM

## 2023-07-24 PROCEDURE — G0180 PR HOME HEALTH MD CERTIFICATION: ICD-10-PCS | Mod: ,,, | Performed by: FAMILY MEDICINE

## 2023-07-24 PROCEDURE — G0180 MD CERTIFICATION HHA PATIENT: HCPCS | Mod: ,,, | Performed by: FAMILY MEDICINE

## 2023-07-26 ENCOUNTER — TELEPHONE (OUTPATIENT)
Dept: FAMILY MEDICINE | Facility: CLINIC | Age: 88
End: 2023-07-26

## 2023-07-26 NOTE — TELEPHONE ENCOUNTER
----- Message from Ly Sultana LPN sent at 7/12/2023  5:07 PM CDT -----  Check on covid test Bill. Speak with Becca.

## 2023-07-26 NOTE — TELEPHONE ENCOUNTER
Spoke with Becca. States she has not gotten an update. States she will try to find something out and update tomorrow. Reminder created. [phone]

## 2023-07-27 ENCOUNTER — DOCUMENT SCAN (OUTPATIENT)
Dept: HOME HEALTH SERVICES | Facility: HOSPITAL | Age: 88
End: 2023-07-27
Payer: MEDICARE

## 2023-08-09 ENCOUNTER — TELEPHONE (OUTPATIENT)
Dept: FAMILY MEDICINE | Facility: CLINIC | Age: 88
End: 2023-08-09

## 2023-08-09 NOTE — TELEPHONE ENCOUNTER
Spoke with Michael, states he did speak with the insurance and this will not be a pill for the pt to pay.

## 2023-08-24 ENCOUNTER — OUTSIDE PLACE OF SERVICE (OUTPATIENT)
Dept: FAMILY MEDICINE | Facility: CLINIC | Age: 88
End: 2023-08-24
Payer: MEDICARE

## 2023-08-24 DIAGNOSIS — Z74.1 NEED FOR ASSISTANCE WITH PERSONAL CARE: ICD-10-CM

## 2023-08-24 DIAGNOSIS — M25.561 KNEE PAIN, RIGHT: ICD-10-CM

## 2023-08-24 DIAGNOSIS — F02.80 ALZHEIMER'S DEMENTIA: Primary | ICD-10-CM

## 2023-08-24 DIAGNOSIS — G30.9 ALZHEIMER'S DEMENTIA: Primary | ICD-10-CM

## 2023-08-24 PROCEDURE — 1160F RVW MEDS BY RX/DR IN RCRD: CPT | Mod: CPTII,S$GLB,, | Performed by: NURSE PRACTITIONER

## 2023-08-24 PROCEDURE — 99348 PR HOME VISIT,ESTAB PATIENT,LEVEL II: ICD-10-PCS | Mod: S$GLB,,, | Performed by: NURSE PRACTITIONER

## 2023-08-24 PROCEDURE — 1159F MED LIST DOCD IN RCRD: CPT | Mod: CPTII,S$GLB,, | Performed by: NURSE PRACTITIONER

## 2023-08-24 PROCEDURE — 99348 HOME/RES VST EST LOW MDM 30: CPT | Mod: S$GLB,,, | Performed by: NURSE PRACTITIONER

## 2023-08-24 PROCEDURE — 1159F PR MEDICATION LIST DOCUMENTED IN MEDICAL RECORD: ICD-10-PCS | Mod: CPTII,S$GLB,, | Performed by: NURSE PRACTITIONER

## 2023-08-24 PROCEDURE — 1160F PR REVIEW ALL MEDS BY PRESCRIBER/CLIN PHARMACIST DOCUMENTED: ICD-10-PCS | Mod: CPTII,S$GLB,, | Performed by: NURSE PRACTITIONER

## 2023-08-29 ENCOUNTER — EXTERNAL HOME HEALTH (OUTPATIENT)
Dept: HOME HEALTH SERVICES | Facility: HOSPITAL | Age: 88
End: 2023-08-29
Payer: MEDICARE

## 2023-10-12 ENCOUNTER — TELEPHONE (OUTPATIENT)
Dept: FAMILY MEDICINE | Facility: CLINIC | Age: 88
End: 2023-10-12

## 2023-10-12 NOTE — TELEPHONE ENCOUNTER
----- Message from Zenia Varner MA sent at 10/12/2023  3:21 PM CDT -----    ----- Message -----  From: Asha Guardado  Sent: 10/12/2023   3:21 PM CDT  To: Dallin Pleitez Staff    Tory morgan is calling and she sent over some refill request on the 10th. She needs the lexapro 10 mg at night, denepazil 5 mg daily, asprin 81 mg daily, pain relief 800 mg 2 by mouth at night and seroquel 100 mg tab at night. Please send to [facility]   [phone]

## 2023-10-12 NOTE — TELEPHONE ENCOUNTER
Spoke with Tory at [facility] in regards to message. Informed her that we sent of a 3 mo supply with 1 refill to [facility] in June. Informed her that pt pain med not on our list.  Stated she was prescribed GS pain relief 500mg capsules. States [facility] faxed over forms needing PAs for next refill.    Called [facility] in regards to above. The associate stated that she that she has refills for pts medication and there was nothing sent out from them that she is aware of.     Informed Tory of above. She voiced understanding.

## 2023-10-20 ENCOUNTER — OUTSIDE PLACE OF SERVICE (OUTPATIENT)
Dept: FAMILY MEDICINE | Facility: CLINIC | Age: 88
End: 2023-10-20
Payer: MEDICARE

## 2023-10-20 DIAGNOSIS — S80.00XA KNEE CONTUSION: ICD-10-CM

## 2023-10-20 DIAGNOSIS — F03.918 DEMENTIA WITH BEHAVIORAL DISTURBANCE: Primary | ICD-10-CM

## 2023-10-20 DIAGNOSIS — Z87.81 HX OF FRACTURE OF RIGHT HIP: ICD-10-CM

## 2023-10-20 PROCEDURE — 1159F MED LIST DOCD IN RCRD: CPT | Mod: CPTII,S$GLB,, | Performed by: FAMILY MEDICINE

## 2023-10-20 PROCEDURE — 1159F PR MEDICATION LIST DOCUMENTED IN MEDICAL RECORD: ICD-10-PCS | Mod: CPTII,S$GLB,, | Performed by: FAMILY MEDICINE

## 2023-10-20 PROCEDURE — 1160F PR REVIEW ALL MEDS BY PRESCRIBER/CLIN PHARMACIST DOCUMENTED: ICD-10-PCS | Mod: CPTII,S$GLB,, | Performed by: FAMILY MEDICINE

## 2023-10-20 PROCEDURE — 1160F RVW MEDS BY RX/DR IN RCRD: CPT | Mod: CPTII,S$GLB,, | Performed by: FAMILY MEDICINE

## 2023-10-20 PROCEDURE — 99348 HOME/RES VST EST LOW MDM 30: CPT | Mod: S$GLB,,, | Performed by: FAMILY MEDICINE

## 2023-10-20 PROCEDURE — 99348 PR HOME VISIT,ESTAB PATIENT,LEVEL II: ICD-10-PCS | Mod: S$GLB,,, | Performed by: FAMILY MEDICINE

## 2024-02-07 DIAGNOSIS — G30.9 ALZHEIMER'S DISEASE: ICD-10-CM

## 2024-02-07 DIAGNOSIS — F02.80 ALZHEIMER'S DISEASE: ICD-10-CM

## 2024-02-07 RX ORDER — ESCITALOPRAM OXALATE 10 MG/1
10 TABLET ORAL NIGHTLY
Qty: 90 TABLET | Refills: 1 | Status: SHIPPED | OUTPATIENT
Start: 2024-02-07

## 2024-02-07 NOTE — TELEPHONE ENCOUNTER
----- Message from Asha Guardado sent at 2/7/2024 10:16 AM CST -----  - 9:29- angel castro is calling for refill on [facility] 30 day   [phone]

## 2024-02-27 DIAGNOSIS — Z00.00 ENCOUNTER FOR MEDICARE ANNUAL WELLNESS EXAM: ICD-10-CM

## 2024-03-08 ENCOUNTER — OUTSIDE PLACE OF SERVICE (OUTPATIENT)
Dept: FAMILY MEDICINE | Facility: CLINIC | Age: 89
End: 2024-03-08
Payer: MEDICARE

## 2024-03-08 DIAGNOSIS — D64.9 ANEMIA: ICD-10-CM

## 2024-03-08 DIAGNOSIS — N39.0 UTI (URINARY TRACT INFECTION): Primary | ICD-10-CM

## 2024-03-08 DIAGNOSIS — N76.0 VAGINITIS: ICD-10-CM

## 2024-03-08 DIAGNOSIS — M19.90 OSTEOARTHRITIS: ICD-10-CM

## 2024-03-08 DIAGNOSIS — F03.918 DEMENTIA WITH BEHAVIORAL DISTURBANCE: ICD-10-CM

## 2024-03-08 PROCEDURE — 99349 HOME/RES VST EST MOD MDM 40: CPT | Mod: S$GLB,,, | Performed by: FAMILY MEDICINE

## 2024-03-11 DIAGNOSIS — Z86.59 MENTAL STATUS CHANGE RESOLVED: Primary | ICD-10-CM

## 2024-03-12 ENCOUNTER — TELEPHONE (OUTPATIENT)
Dept: FAMILY MEDICINE | Facility: CLINIC | Age: 89
End: 2024-03-12
Payer: MEDICARE

## 2024-03-13 NOTE — TELEPHONE ENCOUNTER
"Spoke with Allyssa and informed patient passed out while using the restroom and came too about after 15 seconds. Believes she had a vagal response.   Per Dr. Pleitez, "Okay do blood work - No ER."  Allyssa verbalized understanding of Dr. Pleitez's message. Informs blood has already been drawn and urine collected today and will be dropped off at [facility] today.   "
----- Message from Asha Guardado sent at 3/12/2024 10:29 AM CDT -----  Allyssa leonard is calling and she had a vagel response when she had a bowel movement. She got white. Her blood pressure was 105/52. 30 -45 minutes prior it was 108/64. She has not family and if she goes to the hospital she will be on her own. She is not sure if she needs a cardiac work up   [phone]    
Carmen with [facility] contacted clinic to inform labs came back hemoglobin 6.0 and hematocrit 23.6. Still waiting on [facility] results. Will be faxing results to us now.     Carmen [phone]   
Contacting patient see how she is feeling today. Unable to leave voice message for patient.   
Dr. Pleitez will be contacting [facility].    
Labs have been received and given to provider for review.   
Spoke with Carmen and informs that they have not seen her today able to send nurse out tomorrow but leaves today at 4:30 pm today and will be back tomorrow at 0830 in the morning.   
left

## 2024-03-14 ENCOUNTER — TELEPHONE (OUTPATIENT)
Dept: FAMILY MEDICINE | Facility: CLINIC | Age: 89
End: 2024-03-14
Payer: MEDICARE

## 2024-03-14 DIAGNOSIS — D50.8 IRON DEFICIENCY ANEMIA SECONDARY TO INADEQUATE DIETARY IRON INTAKE: Primary | ICD-10-CM

## 2024-03-14 RX ORDER — ONDANSETRON 4 MG/1
4 TABLET, ORALLY DISINTEGRATING ORAL EVERY 8 HOURS PRN
Qty: 20 TABLET | Refills: 2 | Status: ON HOLD | OUTPATIENT
Start: 2024-03-14 | End: 2024-05-15

## 2024-03-14 RX ORDER — BACILLUS COAGULANS 1B CELL
1 CAPSULE ORAL DAILY
Qty: 30 TABLET | Refills: 5 | Status: ON HOLD | OUTPATIENT
Start: 2024-03-14 | End: 2024-05-15

## 2024-03-14 NOTE — TELEPHONE ENCOUNTER
"Per Dr. Pleitez, "Have spoken with Anamaria and she will be contacting patient's nephew in [place] and will make the decision for iron treatment or hospital.   Carmen is on the phone at this time and will call us back.   "

## 2024-03-14 NOTE — TELEPHONE ENCOUNTER
----- Message from Asha Guardado sent at 3/14/2024  9:19 AM CDT -----  Carmen with [facility] is calling to see if we sent patient to the hospital from yesterday or what the outcome was   [phone]

## 2024-03-14 NOTE — TELEPHONE ENCOUNTER
"Fior Donahue MA P [facility] Staff  Caller: Unspecified (Today, 10:48 AM)  Carmen with [facility] is returning your call    Pt: [phone]  -DN    Per Dr. Pleitez, "Have spoken with Anamaria and she will be contacting patient's nephew in [place] and will make the decision for iron treatment or hospital.    Informed Carmen of Dr. Pleitez's message and verbalized understanding.   "

## 2024-03-25 ENCOUNTER — TELEPHONE (OUTPATIENT)
Dept: FAMILY MEDICINE | Facility: CLINIC | Age: 89
End: 2024-03-25
Payer: MEDICARE

## 2024-03-25 NOTE — TELEPHONE ENCOUNTER
Asha Guardado Staff  Caller: Unspecified (Today, 10:11 AM)  Anisa with home health is calling back and she is not on hospice. Her nephew is out of the country till April 5th and will talk to them about hospice then.  [phone]

## 2024-03-25 NOTE — TELEPHONE ENCOUNTER
"Per Dr. Pleitez, "Will wait until 4/5 and discuss hospice with her nephew." Spoke with Anisa and informed of Dr. Pleitez's message and verbalized understanding.   "

## 2024-03-25 NOTE — TELEPHONE ENCOUNTER
"Spoke with Anisa patient's physical therapist and states the nurse saw patient earlier and when she came to see patient, complaining of left side of chest and under left breast. Denies shortness of breath. Respirations is 20. O2 was in the 90s, After instructed patient take deep breaths O2 got up to 93. 114/56 blood pressure, 100/60 blood pressure reading from home health nurse. Patient is not coughing at this time and not complaining of any other symptoms or headaches.   Anisa informs patient did not get the iron transfusion and was to start taking iron supplements.     Per Dr. Pleitez, "Have Anisa reach out to [facility]." Anisa verbalized understanding and will be getting further with home health nurse to reach out to [facility].   "

## 2024-03-25 NOTE — TELEPHONE ENCOUNTER
----- Message from Asha Geo sent at 3/25/2024  9:49 AM CDT -----  Anisa with home health physical therapy is calling and she is complaining to a pain on her left side right above her breast and under breast. Her oxygen is 93. She was lying down when the nurse came earlier. She had a tray on her chest and she started coughing. The nurse assessed her and she was fine.  Would like to know what to do about it   [phone]

## 2024-04-01 ENCOUNTER — DOCUMENT SCAN (OUTPATIENT)
Dept: HOME HEALTH SERVICES | Facility: HOSPITAL | Age: 89
End: 2024-04-01
Payer: MEDICARE

## 2024-04-03 ENCOUNTER — EXTERNAL HOME HEALTH (OUTPATIENT)
Dept: HOME HEALTH SERVICES | Facility: HOSPITAL | Age: 89
End: 2024-04-03
Payer: MEDICARE

## 2024-04-08 DIAGNOSIS — G30.9 ALZHEIMER'S DISEASE: ICD-10-CM

## 2024-04-08 DIAGNOSIS — F02.80 ALZHEIMER'S DISEASE: ICD-10-CM

## 2024-04-08 RX ORDER — QUETIAPINE FUMARATE 100 MG/1
100 TABLET, FILM COATED ORAL NIGHTLY
Qty: 90 TABLET | Refills: 1 | Status: SHIPPED | OUTPATIENT
Start: 2024-04-08 | End: 2025-04-08

## 2024-04-08 NOTE — TELEPHONE ENCOUNTER
----- Message from Asha Guardado sent at 4/8/2024  9:41 AM CDT -----  - 9:36-angel with [facility] is calling for refill on seroquel   [phone]

## 2024-04-26 ENCOUNTER — HOSPITAL ENCOUNTER (INPATIENT)
Facility: HOSPITAL | Age: 89
LOS: 2 days | Discharge: NURSING, REHAB OR LONG-TERM CARE | DRG: 812 | End: 2024-04-29
Attending: STUDENT IN AN ORGANIZED HEALTH CARE EDUCATION/TRAINING PROGRAM | Admitting: STUDENT IN AN ORGANIZED HEALTH CARE EDUCATION/TRAINING PROGRAM
Payer: MEDICARE

## 2024-04-26 DIAGNOSIS — R55 SYNCOPE: Primary | ICD-10-CM

## 2024-04-26 DIAGNOSIS — R07.9 CHEST PAIN: ICD-10-CM

## 2024-04-26 PROBLEM — F03.90 DEMENTIA: Status: ACTIVE | Noted: 2024-04-26

## 2024-04-26 PROBLEM — D50.9 IRON DEFICIENCY ANEMIA: Status: ACTIVE | Noted: 2024-04-26

## 2024-04-26 PROBLEM — R73.9 HYPERGLYCEMIA: Status: ACTIVE | Noted: 2024-04-26

## 2024-04-26 PROBLEM — I44.7 LEFT BUNDLE BRANCH BLOCK: Status: ACTIVE | Noted: 2024-04-26

## 2024-04-26 PROBLEM — R01.1 MURMUR: Status: ACTIVE | Noted: 2024-04-26

## 2024-04-26 LAB
ABO + RH BLD: NORMAL
ALBUMIN SERPL BCP-MCNC: 3.6 G/DL (ref 3.5–5.2)
ALP SERPL-CCNC: 55 U/L (ref 55–135)
ALT SERPL W/O P-5'-P-CCNC: 6 U/L (ref 10–44)
ANION GAP SERPL CALC-SCNC: 8 MMOL/L (ref 8–16)
AST SERPL-CCNC: 11 U/L (ref 10–40)
BASOPHILS # BLD AUTO: 0.03 K/UL (ref 0–0.2)
BASOPHILS NFR BLD: 0.3 % (ref 0–1.9)
BILIRUB SERPL-MCNC: 0.2 MG/DL (ref 0.1–1)
BLD GP AB SCN CELLS X3 SERPL QL: NORMAL
BNP SERPL-MCNC: 227 PG/ML (ref 0–99)
BUN SERPL-MCNC: 21 MG/DL (ref 10–30)
CALCIUM SERPL-MCNC: 8.3 MG/DL (ref 8.7–10.5)
CHLORIDE SERPL-SCNC: 105 MMOL/L (ref 95–110)
CO2 SERPL-SCNC: 26 MMOL/L (ref 23–29)
CREAT SERPL-MCNC: 0.8 MG/DL (ref 0.5–1.4)
DIFFERENTIAL METHOD BLD: ABNORMAL
EOSINOPHIL # BLD AUTO: 0 K/UL (ref 0–0.5)
EOSINOPHIL NFR BLD: 0.2 % (ref 0–8)
ERYTHROCYTE [DISTWIDTH] IN BLOOD BY AUTOMATED COUNT: 19.9 % (ref 11.5–14.5)
EST. GFR  (NO RACE VARIABLE): >60 ML/MIN/1.73 M^2
GLUCOSE SERPL-MCNC: 181 MG/DL (ref 70–110)
HCT VFR BLD AUTO: 19.9 % (ref 37–48.5)
HGB BLD-MCNC: 5 G/DL (ref 12–16)
IMM GRANULOCYTES # BLD AUTO: 0.04 K/UL (ref 0–0.04)
IMM GRANULOCYTES NFR BLD AUTO: 0.4 % (ref 0–0.5)
LYMPHOCYTES # BLD AUTO: 0.4 K/UL (ref 1–4.8)
LYMPHOCYTES NFR BLD: 3.8 % (ref 18–48)
MAGNESIUM SERPL-MCNC: 2.1 MG/DL (ref 1.6–2.6)
MCH RBC QN AUTO: 16.9 PG (ref 27–31)
MCHC RBC AUTO-ENTMCNC: 25.1 G/DL (ref 32–36)
MCV RBC AUTO: 67 FL (ref 82–98)
MONOCYTES # BLD AUTO: 0.3 K/UL (ref 0.3–1)
MONOCYTES NFR BLD: 2.9 % (ref 4–15)
NEUTROPHILS # BLD AUTO: 8.5 K/UL (ref 1.8–7.7)
NEUTROPHILS NFR BLD: 92.4 % (ref 38–73)
NRBC BLD-RTO: 0 /100 WBC
PLATELET # BLD AUTO: 433 K/UL (ref 150–450)
PMV BLD AUTO: 8.6 FL (ref 9.2–12.9)
POTASSIUM SERPL-SCNC: 3.7 MMOL/L (ref 3.5–5.1)
PROT SERPL-MCNC: 6.3 G/DL (ref 6–8.4)
RBC # BLD AUTO: 2.96 M/UL (ref 4–5.4)
SODIUM SERPL-SCNC: 139 MMOL/L (ref 136–145)
SPECIMEN OUTDATE: NORMAL
TROPONIN I SERPL HS-MCNC: 14.8 PG/ML (ref 0–14.9)
TROPONIN I SERPL HS-MCNC: 15.2 PG/ML (ref 0–14.9)
WBC # BLD AUTO: 9.17 K/UL (ref 3.9–12.7)

## 2024-04-26 PROCEDURE — 84484 ASSAY OF TROPONIN QUANT: CPT | Performed by: STUDENT IN AN ORGANIZED HEALTH CARE EDUCATION/TRAINING PROGRAM

## 2024-04-26 PROCEDURE — 85025 COMPLETE CBC W/AUTO DIFF WBC: CPT | Performed by: STUDENT IN AN ORGANIZED HEALTH CARE EDUCATION/TRAINING PROGRAM

## 2024-04-26 PROCEDURE — 96361 HYDRATE IV INFUSION ADD-ON: CPT

## 2024-04-26 PROCEDURE — 36430 TRANSFUSION BLD/BLD COMPNT: CPT

## 2024-04-26 PROCEDURE — 36415 COLL VENOUS BLD VENIPUNCTURE: CPT | Performed by: HOSPITALIST

## 2024-04-26 PROCEDURE — 99285 EMERGENCY DEPT VISIT HI MDM: CPT | Mod: 25

## 2024-04-26 PROCEDURE — 80053 COMPREHEN METABOLIC PANEL: CPT | Performed by: STUDENT IN AN ORGANIZED HEALTH CARE EDUCATION/TRAINING PROGRAM

## 2024-04-26 PROCEDURE — 93005 ELECTROCARDIOGRAM TRACING: CPT | Performed by: GENERAL PRACTICE

## 2024-04-26 PROCEDURE — P9016 RBC LEUKOCYTES REDUCED: HCPCS

## 2024-04-26 PROCEDURE — 63600175 PHARM REV CODE 636 W HCPCS: Performed by: STUDENT IN AN ORGANIZED HEALTH CARE EDUCATION/TRAINING PROGRAM

## 2024-04-26 PROCEDURE — G0378 HOSPITAL OBSERVATION PER HR: HCPCS

## 2024-04-26 PROCEDURE — 83880 ASSAY OF NATRIURETIC PEPTIDE: CPT | Performed by: STUDENT IN AN ORGANIZED HEALTH CARE EDUCATION/TRAINING PROGRAM

## 2024-04-26 PROCEDURE — 86920 COMPATIBILITY TEST SPIN: CPT

## 2024-04-26 PROCEDURE — 25000003 PHARM REV CODE 250

## 2024-04-26 PROCEDURE — 84484 ASSAY OF TROPONIN QUANT: CPT | Mod: 91

## 2024-04-26 PROCEDURE — 83735 ASSAY OF MAGNESIUM: CPT

## 2024-04-26 PROCEDURE — 93010 ELECTROCARDIOGRAM REPORT: CPT | Mod: ,,, | Performed by: GENERAL PRACTICE

## 2024-04-26 PROCEDURE — 86850 RBC ANTIBODY SCREEN: CPT | Performed by: STUDENT IN AN ORGANIZED HEALTH CARE EDUCATION/TRAINING PROGRAM

## 2024-04-26 RX ORDER — AMOXICILLIN 250 MG
1 CAPSULE ORAL DAILY PRN
Status: DISCONTINUED | OUTPATIENT
Start: 2024-04-26 | End: 2024-04-29 | Stop reason: HOSPADM

## 2024-04-26 RX ORDER — ALUMINUM HYDROXIDE, MAGNESIUM HYDROXIDE, AND SIMETHICONE 1200; 120; 1200 MG/30ML; MG/30ML; MG/30ML
30 SUSPENSION ORAL 4 TIMES DAILY PRN
Status: DISCONTINUED | OUTPATIENT
Start: 2024-04-26 | End: 2024-04-29 | Stop reason: HOSPADM

## 2024-04-26 RX ORDER — LANOLIN ALCOHOL/MO/W.PET/CERES
800 CREAM (GRAM) TOPICAL
Status: DISCONTINUED | OUTPATIENT
Start: 2024-04-26 | End: 2024-04-29 | Stop reason: HOSPADM

## 2024-04-26 RX ORDER — TALC
6 POWDER (GRAM) TOPICAL NIGHTLY PRN
Status: DISCONTINUED | OUTPATIENT
Start: 2024-04-26 | End: 2024-04-29 | Stop reason: HOSPADM

## 2024-04-26 RX ORDER — ACETAMINOPHEN 500 MG/1
1000 TABLET ORAL NIGHTLY
COMMUNITY
Start: 2024-04-18

## 2024-04-26 RX ORDER — ACETAMINOPHEN 325 MG/1
650 TABLET ORAL EVERY 4 HOURS PRN
Status: DISCONTINUED | OUTPATIENT
Start: 2024-04-26 | End: 2024-04-29 | Stop reason: HOSPADM

## 2024-04-26 RX ORDER — SODIUM,POTASSIUM PHOSPHATES 280-250MG
2 POWDER IN PACKET (EA) ORAL
Status: DISCONTINUED | OUTPATIENT
Start: 2024-04-26 | End: 2024-04-29 | Stop reason: HOSPADM

## 2024-04-26 RX ORDER — ONDANSETRON HYDROCHLORIDE 2 MG/ML
4 INJECTION, SOLUTION INTRAVENOUS EVERY 6 HOURS PRN
Status: DISCONTINUED | OUTPATIENT
Start: 2024-04-26 | End: 2024-04-29 | Stop reason: HOSPADM

## 2024-04-26 RX ORDER — HYDROCODONE BITARTRATE AND ACETAMINOPHEN 500; 5 MG/1; MG/1
TABLET ORAL
Status: DISCONTINUED | OUTPATIENT
Start: 2024-04-26 | End: 2024-04-29 | Stop reason: HOSPADM

## 2024-04-26 RX ORDER — QUETIAPINE FUMARATE 100 MG/1
100 TABLET, FILM COATED ORAL NIGHTLY
Status: DISCONTINUED | OUTPATIENT
Start: 2024-04-26 | End: 2024-04-29 | Stop reason: HOSPADM

## 2024-04-26 RX ORDER — PANTOPRAZOLE SODIUM 40 MG/10ML
40 INJECTION, POWDER, LYOPHILIZED, FOR SOLUTION INTRAVENOUS 2 TIMES DAILY
Status: DISCONTINUED | OUTPATIENT
Start: 2024-04-26 | End: 2024-04-28

## 2024-04-26 RX ORDER — NALOXONE HCL 0.4 MG/ML
0.02 VIAL (ML) INJECTION
Status: DISCONTINUED | OUTPATIENT
Start: 2024-04-26 | End: 2024-04-29 | Stop reason: HOSPADM

## 2024-04-26 RX ORDER — SODIUM CHLORIDE 0.9 % (FLUSH) 0.9 %
10 SYRINGE (ML) INJECTION EVERY 12 HOURS PRN
Status: DISCONTINUED | OUTPATIENT
Start: 2024-04-26 | End: 2024-04-29 | Stop reason: HOSPADM

## 2024-04-26 RX ADMIN — QUETIAPINE 100 MG: 100 TABLET ORAL at 10:04

## 2024-04-26 RX ADMIN — SODIUM CHLORIDE, POTASSIUM CHLORIDE, SODIUM LACTATE AND CALCIUM CHLORIDE 500 ML: 600; 310; 30; 20 INJECTION, SOLUTION INTRAVENOUS at 02:04

## 2024-04-26 NOTE — Clinical Note
Diagnosis: Syncope [206001]   Future Attending Provider: OPAL FRAGOSO [[id number]]   Place in Observation: [facility] [[id number]]   - - -

## 2024-04-26 NOTE — HPI
89-year-old female presented to ED via EMS from nursing home for eval s/p syncope. Patient is confused, she has dementia at baseline and is unable to provide history, history comes from ED. Patient had reported witnessed syncopal episode at nursing home and lost consciousness for about 45 seconds. Patient denied memory of the event, denied preceding symptoms. Denies current complaints. Troponin neg. . CXR  impression with enlarged cardiomediastinal silhouette. EKG with LBBB, which was also present on EKG at this facility from 2021. On exam has murmur. Noted to have hgb/hct of 5.0/19.9. Last labwork available for review is from 2 years ago, at that time hgb was 9-11. Takes asa at home, no other listed AC/AP. Per report, patient has been anemic for the last month or two but patient's family had wanted to try iron supplements prior to blood transfusions. Admit to hospital medicine for further eval.

## 2024-04-26 NOTE — H&P
[facility] - Emergency Dept  Hospital Medicine  History & Physical    Patient Name: Keila Romano  MRN: [id number]  Patient Class: OP- Observation  Admission Date: 4/26/2024  Attending Physician: Lorena Velazco MD   Primary Care Provider: Dallin Pleitez MD         Patient information was obtained from past medical records and ER records.     Subjective:     Principal Problem:Syncope    Chief Complaint:   Chief Complaint   Patient presents with    Loss of Consciousness     Pt arrives via EMS from [facility] for witnessed syncopal episode from a sitting position for about 45 seconds. Denies any head trauma, per EMS patient was awake and back to baseline on arrival to scene.         HPI: 89-year-old female presented to ED via EMS from nursing home for eval s/p syncope. Patient is confused, she has dementia at baseline and is unable to provide history, history comes from ED. Patient had reported witnessed syncopal episode at nursing home and lost consciousness for about 45 seconds. Patient denied memory of the event, denied preceding symptoms. Denies current complaints. Troponin neg. . CXR  impression with enlarged cardiomediastinal silhouette. EKG with LBBB, which was also present on EKG at this facility from 2021. On exam has murmur. Noted to have hgb/hct of 5.0/19.9. Last labwork available for review is from 2 years ago, at that time hgb was 9-11. Takes asa at home, no other listed AC/AP. Per report, patient has been anemic for the last month or two but patient's family had wanted to try iron supplements prior to blood transfusions. Admit to hospital medicine for further eval.     Past Medical History:   Diagnosis Date    Dementia     Hip fracture     L       Past Surgical History:   Procedure Laterality Date    PERCUTANEOUS PINNING OF HIP Right 11/22/2021    Procedure: PINNING, HIP, PERCUTANEOUS;  Surgeon: Navneet Rios MD;  Location: [facility] OR;  Service: Orthopedics;  Laterality: Right;        Review of patient's allergies indicates:  No Known Allergies    Current Facility-Administered Medications   Medication Dose Route Frequency Provider Last Rate Last Admin    0.9%  NaCl infusion (for blood administration)   Intravenous Q24H PRN Francie Ford NP        acetaminophen tablet 650 mg  650 mg Oral Q4H PRN DeaconsFrancie, NP        aluminum-magnesium hydroxide-simethicone 200-200-20 mg/5 mL suspension 30 mL  30 mL Oral QID PRN DeaconsFrancie NP        lactated ringers bolus 500 mL  500 mL Intravenous Once Boston Awad  mL/hr at 04/26/24 1437 500 mL at 04/26/24 1437    magnesium oxide tablet 800 mg  800 mg Oral PRN Derbins, Francie HERMINIA, NP        magnesium oxide tablet 800 mg  800 mg Oral PRN Derbins, Francie M, NP        melatonin tablet 6 mg  6 mg Oral Nightly PRN DeaconsFrancie NP        naloxone 0.4 mg/mL injection 0.02 mg  0.02 mg Intravenous PRN Derbins, Franciejonas HOPE NP        ondansetron injection 4 mg  4 mg Intravenous Q6H PRN Deacons Francie M, NP        potassium bicarbonate disintegrating tablet 35 mEq  35 mEq Oral PRN Derbins, Francie M, NP        potassium bicarbonate disintegrating tablet 50 mEq  50 mEq Oral PRN Derbins, Francie M, NP        potassium bicarbonate disintegrating tablet 60 mEq  60 mEq Oral PRN Derbins, Francie M, NP        potassium, sodium phosphates 280-160-250 mg packet 2 packet  2 packet Oral PRN Derbins, Francie M NP        potassium, sodium phosphates 280-160-250 mg packet 2 packet  2 packet Oral PRN Derbins, Francie M, NP        potassium, sodium phosphates 280-160-250 mg packet 2 packet  2 packet Oral PRN Derbins, Francie M, NP        QUEtiapine tablet 100 mg  100 mg Oral QHS DeaconsFrancie NP        senna-docusate 8.6-50 mg per tablet 1 tablet  1 tablet Oral Daily PRN Dercandis Francie M, NP        sodium chloride 0.9% flush 10 mL  10 mL Intravenous Q12H PRN Francie Ford NP         Current Outpatient Medications   Medication Sig Dispense  Refill    aspirin (ECOTRIN) 81 MG EC tablet Take 1 tablet (81 mg total) by mouth once daily. (Patient taking differently: Take 81 mg by mouth once daily. 08:00) 90 tablet 1    donepeziL (ARICEPT) 5 MG tablet Take 1 tablet (5 mg total) by mouth once daily. (Patient taking differently: Take 5 mg by mouth once daily. 08:00) 90 tablet 1    EScitalopram oxalate (LEXAPRO) 10 MG tablet Take 1 tablet (10 mg total) by mouth every evening. (Patient taking differently: Take 10 mg by mouth every evening. 18:00) 90 tablet 1    PAIN RELIEF, ACETAMINOPHEN, 500 mg tablet Take 1,000 mg by mouth every evening. 19:00      QUEtiapine (SEROQUEL) 100 MG Tab Take 1 tablet (100 mg total) by mouth every evening. (Patient taking differently: Take 100 mg by mouth every evening. 19:00) 90 tablet 1    diclofenac sodium (VOLTAREN) 1 % Gel Apply 2 g topically once daily. To painful knees (Patient not taking: Reported on 4/26/2024) 100 g 3    iron,carbonyl-vitamin C (VITRON-C) 65 mg iron- 125 mg TbEC Take 1 tablet by mouth once daily. (Patient not taking: Reported on 4/26/2024) 30 tablet 5    ondansetron (ZOFRAN-ODT) 4 MG TbDL Take 1 tablet (4 mg total) by mouth every 8 (eight) hours as needed (nausea). (Patient not taking: Reported on 4/26/2024) 20 tablet 2     Family History    None       Tobacco Use    Smoking status: Never    Smokeless tobacco: Never   Substance and Sexual Activity    Alcohol use: No    Drug use: Never    Sexual activity: Not on file     Review of Systems   Unable to perform ROS: Dementia   Neurological:  Positive for syncope.     Objective:     Vital Signs (Most Recent):  Temp: 98.5 °F (36.9 °C) (04/26/24 1149)  Pulse: 85 (04/26/24 1439)  Resp: 18 (04/26/24 1439)  BP: (!) 131/57 (04/26/24 1439)  SpO2: 95 % (04/26/24 1439) Vital Signs (24h Range):  Temp:  [98.5 °F (36.9 °C)] 98.5 °F (36.9 °C)  Pulse:  [76-86] 85  Resp:  [16-18] 18  SpO2:  [95 %-100 %] 95 %  BP: (121-131)/(46-57) 131/57     Weight: 42.6 kg (94 lb)  Body mass  "index is 17.19 kg/m².     Physical Exam  Vitals reviewed.   Constitutional:       General: She is not in acute distress.  HENT:      Head: Normocephalic and atraumatic.      Mouth/Throat:      Mouth: Mucous membranes are dry.   Eyes:      Conjunctiva/sclera: Conjunctivae normal.   Cardiovascular:      Rate and Rhythm: Normal rate and regular rhythm.      Heart sounds: Murmur heard.   Pulmonary:      Effort: Pulmonary effort is normal. No respiratory distress.      Breath sounds: Normal breath sounds.   Abdominal:      General: Bowel sounds are normal.      Palpations: Abdomen is soft.      Tenderness: There is no abdominal tenderness.   Musculoskeletal:         General: Normal range of motion.      Cervical back: Normal range of motion.      Right lower leg: No edema.      Left lower leg: No edema.   Skin:     General: Skin is warm and dry.      Coloration: Skin is pale.   Neurological:      Mental Status: She is alert. She is confused.   Psychiatric:         Mood and Affect: Affect normal.                Significant Labs: All pertinent labs within the past 24 hours have been reviewed.  A1C: No results for input(s): "HGBA1C" in the last 4320 hours.  Bilirubin:   Recent Labs   Lab 04/26/24  1240   BILITOT 0.2     CBC:   Recent Labs   Lab 04/26/24  1240   WBC 9.17   HGB 5.0*   HCT 19.9*        CMP:   Recent Labs   Lab 04/26/24  1240      K 3.7      CO2 26   *   BUN 21   CREATININE 0.8   CALCIUM 8.3*   PROT 6.3   ALBUMIN 3.6   BILITOT 0.2   ALKPHOS 55   AST 11   ALT 6*   ANIONGAP 8     Cardiac Markers:   Recent Labs   Lab 04/26/24  1240   *     Coagulation: No results for input(s): "PT", "INR", "APTT" in the last 48 hours.  Magnesium: No results for input(s): "MG" in the last 48 hours.  Troponin:   Recent Labs   Lab 04/26/24  1240   TROPONINIHS 14.8     TSH: No results for input(s): "TSH" in the last 4320 hours.  Urine Studies: No results for input(s): "COLORU", "APPEARANCEUA", "PHUR", " ""SPECGRAV", "PROTEINUA", "GLUCUA", "KETONESU", "BILIRUBINUA", "OCCULTUA", "NITRITE", "UROBILINOGEN", "LEUKOCYTESUR", "RBCUA", "WBCUA", "BACTERIA", "SQUAMEPITHEL", "HYALINECASTS" in the last 48 hours.    Invalid input(s): "WRIGHTSUR"    Significant Imaging: I have reviewed all pertinent imaging results/findings within the past 24 hours.  Assessment/Plan:     * Syncope  May be 2/2 symptomatic anemia vs valvular disease vs arrhythmia   TSH, free T4, UA, serial troponins x 3  Echo   Orthostatic VS BID  Fall precautions  EKG PRN  Tele monitoring  PT/OT  Currently no focal deficits, CTH for acute change    Murmur  See above    Anemia  Patient's anemia is currently uncontrolled. Has not received any PRBCs to date. Transfuse 2 units PRBCs. Etiology likely d/t Iron deficiency and blood loss, unknown chronicity  Current CBC reviewed-   Lab Results   Component Value Date    HGB 5.0 (LL) 04/26/2024    HCT 19.9 (LL) 04/26/2024     Monitor serial CBC and transfuse if patient becomes hemodynamically unstable, symptomatic or H/H drops below 7/21.  Occult stool  Iron studies, coags, B12  Avoid thinners  Holding home asa  Holding home lexapro and aricept 2/2 adverse effect bleeding  Protonix IV BID for now  Will order GI consult if occult stool positive    Left bundle branch block  Also noted on EKG in 2021  Tele monitoring    Hyperglycemia  No hx DM  A1c        VTE Risk Mitigation (From admission, onward)           Ordered     Reason for No Pharmacological VTE Prophylaxis  Once        Question:  Reasons:  Answer:  Risk of Bleeding    04/26/24 1526     IP VTE HIGH RISK PATIENT  Once         04/26/24 1526     Place sequential compression device  Until discontinued         04/26/24 1526                     Patient is critically sick at 89 years of age, recommend involving palliative care.       On 04/26/2024, patient should be placed in hospital observation services under my care in collaboration with Dr. Velazco.           Francie HOPE" CELINA Ford  Department of Hospital Medicine  [facility] - Emergency Dept

## 2024-04-26 NOTE — ASSESSMENT & PLAN NOTE
Patient's anemia is currently uncontrolled. Has not received any PRBCs to date. Transfuse 2 units PRBCs. Etiology likely d/t Iron deficiency and blood loss, unknown chronicity  Current CBC reviewed-   Lab Results   Component Value Date    HGB 5.0 (LL) 04/26/2024    HCT 19.9 (LL) 04/26/2024     Monitor serial CBC and transfuse if patient becomes hemodynamically unstable, symptomatic or H/H drops below 7/21.  Occult stool  Iron studies, coags, B12  Avoid thinners  Holding home asa  Holding home lexapro and aricept 2/2 adverse effect bleeding  Protonix IV BID for now  Will order GI consult if occult stool positive

## 2024-04-26 NOTE — SUBJECTIVE & OBJECTIVE
Past Medical History:   Diagnosis Date    Dementia     Hip fracture     L       Past Surgical History:   Procedure Laterality Date    PERCUTANEOUS PINNING OF HIP Right 11/22/2021    Procedure: PINNING, HIP, PERCUTANEOUS;  Surgeon: Navneet Rios MD;  Location: [facility];  Service: Orthopedics;  Laterality: Right;       Review of patient's allergies indicates:  No Known Allergies    Current Facility-Administered Medications   Medication Dose Route Frequency Provider Last Rate Last Admin    0.9%  NaCl infusion (for blood administration)   Intravenous Q24H PRN Dercandis Francie M, NP        acetaminophen tablet 650 mg  650 mg Oral Q4H PRN Derbins, Francie M, NP        aluminum-magnesium hydroxide-simethicone 200-200-20 mg/5 mL suspension 30 mL  30 mL Oral QID PRN Dercandis, Francie M, NP        lactated ringers bolus 500 mL  500 mL Intravenous Once Boston Awad  mL/hr at 04/26/24 1437 500 mL at 04/26/24 1437    magnesium oxide tablet 800 mg  800 mg Oral PRN Derbins, Francie M, NP        magnesium oxide tablet 800 mg  800 mg Oral PRN Derbins, Francie M, NP        melatonin tablet 6 mg  6 mg Oral Nightly PRN Derbins, Francie M, NP        naloxone 0.4 mg/mL injection 0.02 mg  0.02 mg Intravenous PRN Derbins, Francie M, NP        ondansetron injection 4 mg  4 mg Intravenous Q6H PRN Derbins, Francie M, NP        potassium bicarbonate disintegrating tablet 35 mEq  35 mEq Oral PRN Derbins, Francie M, NP        potassium bicarbonate disintegrating tablet 50 mEq  50 mEq Oral PRN Derbins, Francie M, NP        potassium bicarbonate disintegrating tablet 60 mEq  60 mEq Oral PRN Derbins, Francie M, NP        potassium, sodium phosphates 280-160-250 mg packet 2 packet  2 packet Oral PRN Derbins, Francie M, NP        potassium, sodium phosphates 280-160-250 mg packet 2 packet  2 packet Oral PRN Derbins, Francie M, NP        potassium, sodium phosphates 280-160-250 mg packet 2 packet  2 packet Oral PRN Derbins, Francie M, NP        QUEtiapine  tablet 100 mg  100 mg Oral QHS Francie Ford NP        senna-docusate 8.6-50 mg per tablet 1 tablet  1 tablet Oral Daily PRN Francie Ford NP        sodium chloride 0.9% flush 10 mL  10 mL Intravenous Q12H PRN Francie Ford NP         Current Outpatient Medications   Medication Sig Dispense Refill    aspirin (ECOTRIN) 81 MG EC tablet Take 1 tablet (81 mg total) by mouth once daily. (Patient taking differently: Take 81 mg by mouth once daily. 08:00) 90 tablet 1    donepeziL (ARICEPT) 5 MG tablet Take 1 tablet (5 mg total) by mouth once daily. (Patient taking differently: Take 5 mg by mouth once daily. 08:00) 90 tablet 1    EScitalopram oxalate (LEXAPRO) 10 MG tablet Take 1 tablet (10 mg total) by mouth every evening. (Patient taking differently: Take 10 mg by mouth every evening. 18:00) 90 tablet 1    PAIN RELIEF, ACETAMINOPHEN, 500 mg tablet Take 1,000 mg by mouth every evening. 19:00      QUEtiapine (SEROQUEL) 100 MG Tab Take 1 tablet (100 mg total) by mouth every evening. (Patient taking differently: Take 100 mg by mouth every evening. 19:00) 90 tablet 1    diclofenac sodium (VOLTAREN) 1 % Gel Apply 2 g topically once daily. To painful knees (Patient not taking: Reported on 4/26/2024) 100 g 3    iron,carbonyl-vitamin C (VITRON-C) 65 mg iron- 125 mg TbEC Take 1 tablet by mouth once daily. (Patient not taking: Reported on 4/26/2024) 30 tablet 5    ondansetron (ZOFRAN-ODT) 4 MG TbDL Take 1 tablet (4 mg total) by mouth every 8 (eight) hours as needed (nausea). (Patient not taking: Reported on 4/26/2024) 20 tablet 2     Family History    None       Tobacco Use    Smoking status: Never    Smokeless tobacco: Never   Substance and Sexual Activity    Alcohol use: No    Drug use: Never    Sexual activity: Not on file     Review of Systems   Unable to perform ROS: Dementia   Neurological:  Positive for syncope.     Objective:     Vital Signs (Most Recent):  Temp: 98.5 °F (36.9 °C) (04/26/24 1149)  Pulse: 85  "(04/26/24 1439)  Resp: 18 (04/26/24 1439)  BP: (!) 131/57 (04/26/24 1439)  SpO2: 95 % (04/26/24 1439) Vital Signs (24h Range):  Temp:  [98.5 °F (36.9 °C)] 98.5 °F (36.9 °C)  Pulse:  [76-86] 85  Resp:  [16-18] 18  SpO2:  [95 %-100 %] 95 %  BP: (121-131)/(46-57) 131/57     Weight: 42.6 kg (94 lb)  Body mass index is 17.19 kg/m².     Physical Exam  Vitals reviewed.   Constitutional:       General: She is not in acute distress.  HENT:      Head: Normocephalic and atraumatic.      Mouth/Throat:      Mouth: Mucous membranes are dry.   Eyes:      Conjunctiva/sclera: Conjunctivae normal.   Cardiovascular:      Rate and Rhythm: Normal rate and regular rhythm.      Heart sounds: Murmur heard.   Pulmonary:      Effort: Pulmonary effort is normal. No respiratory distress.      Breath sounds: Normal breath sounds.   Abdominal:      General: Bowel sounds are normal.      Palpations: Abdomen is soft.      Tenderness: There is no abdominal tenderness.   Musculoskeletal:         General: Normal range of motion.      Cervical back: Normal range of motion.      Right lower leg: No edema.      Left lower leg: No edema.   Skin:     General: Skin is warm and dry.      Coloration: Skin is pale.   Neurological:      Mental Status: She is alert. She is confused.   Psychiatric:         Mood and Affect: Affect normal.                Significant Labs: All pertinent labs within the past 24 hours have been reviewed.  A1C: No results for input(s): "HGBA1C" in the last 4320 hours.  Bilirubin:   Recent Labs   Lab 04/26/24  1240   BILITOT 0.2     CBC:   Recent Labs   Lab 04/26/24  1240   WBC 9.17   HGB 5.0*   HCT 19.9*        CMP:   Recent Labs   Lab 04/26/24  1240      K 3.7      CO2 26   *   BUN 21   CREATININE 0.8   CALCIUM 8.3*   PROT 6.3   ALBUMIN 3.6   BILITOT 0.2   ALKPHOS 55   AST 11   ALT 6*   ANIONGAP 8     Cardiac Markers:   Recent Labs   Lab 04/26/24  1240   *     Coagulation: No results for input(s): " ""PT", "INR", "APTT" in the last 48 hours.  Magnesium: No results for input(s): "MG" in the last 48 hours.  Troponin:   Recent Labs   Lab 04/26/24  1240   TROPONINIHS 14.8     TSH: No results for input(s): "TSH" in the last 4320 hours.  Urine Studies: No results for input(s): "COLORU", "APPEARANCEUA", "PHUR", "SPECGRAV", "PROTEINUA", "GLUCUA", "KETONESU", "BILIRUBINUA", "OCCULTUA", "NITRITE", "UROBILINOGEN", "LEUKOCYTESUR", "RBCUA", "WBCUA", "BACTERIA", "SQUAMEPITHEL", "HYALINECASTS" in the last 48 hours.    Invalid input(s): "WRIGHTSUR"    Significant Imaging: I have reviewed all pertinent imaging results/findings within the past 24 hours.  "

## 2024-04-26 NOTE — ASSESSMENT & PLAN NOTE
May be 2/2 symptomatic anemia vs valvular disease vs arrhythmia   TSH, free T4, UA, serial troponins x 3  Echo   Orthostatic VS BID  Fall precautions  EKG PRN  Tele monitoring  PT/OT

## 2024-04-26 NOTE — ED PROVIDER NOTES
Encounter Date: 4/26/2024       History     Chief Complaint   Patient presents with    Loss of Consciousness     Pt arrives via EMS from [facility] for witnessed syncopal episode from a sitting position for about 45 seconds. Denies any head trauma, per EMS patient was awake and back to baseline on arrival to scene.      HPI    Patient is a very pleasant 89-year-old female with history of dementia presenting from nursing home for witnessed episode of syncope.  Patient was sitting down when she lost consciousness for about 45 seconds.  Patient denied any symptoms prior to the syncopal event and does not remember passing out on my evaluation.  Reports normal p.o. intake.  Denies any chest pain, shortness of breath, leg swelling, or any other symptoms today.  Patient denies any history of syncope and denies any memory of heart surgeries in the past.    Review of patient's allergies indicates:  No Known Allergies  Past Medical History:   Diagnosis Date    Dementia     Hip fracture     L     Past Surgical History:   Procedure Laterality Date    PERCUTANEOUS PINNING OF HIP Right 11/22/2021    Procedure: PINNING, HIP, PERCUTANEOUS;  Surgeon: Navneet Rios MD;  [facility];  Service: Orthopedics;  Laterality: Right;     Family History   Problem Relation Name Age of Onset    Heart disease Neg Hx       Social History     Tobacco Use    Smoking status: Never    Smokeless tobacco: Never   Substance Use Topics    Alcohol use: No    Drug use: Never     Review of Systems   Constitutional:  Negative for activity change and appetite change.   Cardiovascular:  Negative for chest pain, palpitations and leg swelling.   Gastrointestinal:  Negative for abdominal pain, nausea and vomiting.   Neurological:  Positive for syncope.       Physical Exam     Initial Vitals [04/26/24 1149]   BP Pulse Resp Temp SpO2   (!) 125/52 76 16 98.5 °F (36.9 °C) 100 %      MAP       --         Physical Exam    Constitutional: She appears  well-developed and well-nourished.   HENT:   Head: Normocephalic and atraumatic.   Eyes:   JVP estimated at 7 cm H2O   Neck:   Normal range of motion.  Cardiovascular:  Normal rate.           Loud systolic murmur with radiation to the carotid   Pulmonary/Chest: Breath sounds normal. No respiratory distress. She has no wheezes. She has no rales.   Abdominal: Abdomen is soft. She exhibits no distension. There is no abdominal tenderness.   Musculoskeletal:         General: No edema.      Cervical back: Normal range of motion.     Skin: Skin is warm and dry. No rash noted.         ED Course   Procedures  Labs Reviewed   CBC W/ AUTO DIFFERENTIAL - Abnormal; Notable for the following components:       Result Value    RBC 2.96 (*)     Hemoglobin 5.0 (*)     Hematocrit 19.9 (*)     MCV 67 (*)     MCH 16.9 (*)     MCHC 25.1 (*)     RDW 19.9 (*)     MPV 8.6 (*)     Gran # (ANC) 8.5 (*)     Lymph # 0.4 (*)     Gran % 92.4 (*)     Lymph % 3.8 (*)     Mono % 2.9 (*)     All other components within normal limits    Narrative:     Hgb/Hct critical result(s) repeated. Called and verbal readback   obtained from Chris HOLLINGSWORTH in the ER by SAMUEL 04/26/2024 13:30   COMPREHENSIVE METABOLIC PANEL - Abnormal; Notable for the following components:    Glucose 181 (*)     Calcium 8.3 (*)     ALT 6 (*)     All other components within normal limits   B-TYPE NATRIURETIC PEPTIDE - Abnormal; Notable for the following components:     (*)     All other components within normal limits   TROPONIN I HIGH SENSITIVITY   TYPE & SCREEN        ECG Results              EKG 12-lead (In process)        Collection Time Result Time QRS Duration OHS QTC Calculation    04/26/24 15:07:45 04/26/24 15:11:44 116 507                     In process by Interface, Lab In [facility] (04/26/24 15:11:53)                   Narrative:    Test Reason : R07.9,    Vent. Rate : 083 BPM     Atrial Rate : 083 BPM     P-R Int : 144 ms          QRS Dur : 116 ms      QT Int : 432 ms        P-R-T Axes : 011 -33 085 degrees     QTc Int : 507 ms    Normal sinus rhythm  Left axis deviation  Anterior infarct ,age undetermined  Abnormal ECG  When compared with ECG of 22-DEC-2021 17:21,  Left bundle branch block is no longer Present  Anterior infarct is now Present    Referred By: [email or web address]   SELF           Confirmed By:                                   Imaging Results              X-Ray Chest AP Portable (Final result)  Result time 04/26/24 12:47:26      Final result by Dawson Ruvalcaba DO (04/26/24 12:47:26)                   Impression:      Enlarged cardiomediastinal silhouette.      Electronically signed by: Dawson Ruvalcaba  Date:    04/26/2024  Time:    12:47               Narrative:    EXAMINATION:  XR CHEST AP PORTABLE    CLINICAL HISTORY:  Chest Pain;    FINDINGS:  Portable chest with comparison chest x-ray 11/21/2021.  Enlarged cardiomediastinal silhouette.Lungs are clear. Pulmonary vasculature is normal. No acute osseous abnormality.                                       Medications   lactated ringers bolus 500 mL (500 mLs Intravenous New Bag 4/26/24 1437)     Medical Decision Making  89-year-old female with history of dementia presenting with witnessed syncopal event that lasted approximately 45 seconds.  No postictal state and patient was at baseline per staff when EMS arrived.  Denies any symptoms of chest pain, palpitations, shortness of breath.  Vital signs here are stable with slightly low diastolic pressure.  Patient at this time feels at baseline.  Exam as above with wound systolic murmur with radiation to the carotid consistent with aortic stenosis.  Differential includes arrhythmia, hypovolemia, hypoglycemia.  Started does not seem consistent with seizure.    Hemoglobin found to be 5.0.  Blood pressure also low at 76/47.  Patient remains alert and asymptomatic.  No evidence of active blood loss.  Attempted to call nephew however unable to dial out international numbers from hospital.   Administration is aware and working on this.  EKG completed by EMS is consistent with left bundle-branch block.  No evidence of acute ischemia.     Update: Nephew called hospital to clarify a few details.  Patient states that several weeks ago he was given the option of iron supplement versus transfusion and thought that the choices were equivalent at that time.  Nephew opted for iron supplement at that time.  He is not against blood transfusions and is agreeable if it will help her.  Patient does have a living will which the son is trying to abide by.     35 minutes of critical care time was provided managing severe anemia requiring blood transfusion and high-risk syncope.      Boston Awad MD  Emergency Medicine      Amount and/or Complexity of Data Reviewed  Labs: ordered. Decision-making details documented in ED Course.  Radiology: ordered.               ED Course as of 04/26/24 1525   Fri Apr 26, 2024   1331 Hemoglobin(!!): 5.0 [KH]   1331 BP(!): 76/47 [KH]   1352 BNP(!): 227 [KH]   1352 Hemoglobin(!!): 5.0 [KH]      ED Course User Index  [KH] Boston Awad MD                           Clinical Impression:  Final diagnoses:  [R07.9] Chest pain  [R55] Syncope          ED Disposition Condition    Observation                 Boston Awad MD  04/26/24 1404       Boston Awad MD  04/26/24 1513       Boston Awad MD  04/26/24 1525

## 2024-04-27 ENCOUNTER — CLINICAL SUPPORT (OUTPATIENT)
Dept: CARDIOLOGY | Facility: HOSPITAL | Age: 89
DRG: 812 | End: 2024-04-27
Attending: STUDENT IN AN ORGANIZED HEALTH CARE EDUCATION/TRAINING PROGRAM
Payer: MEDICARE

## 2024-04-27 VITALS — HEIGHT: 62 IN | BODY MASS INDEX: 17.29 KG/M2 | WEIGHT: 93.94 LBS

## 2024-04-27 LAB
ALBUMIN SERPL BCP-MCNC: 3.5 G/DL (ref 3.5–5.2)
ALP SERPL-CCNC: 55 U/L (ref 55–135)
ALT SERPL W/O P-5'-P-CCNC: 6 U/L (ref 10–44)
AMORPH CRY URNS QL MICRO: ABNORMAL
ANION GAP SERPL CALC-SCNC: 8 MMOL/L (ref 8–16)
ANISOCYTOSIS BLD QL SMEAR: SLIGHT
AORTIC ROOT ANNULUS: 2.3 CM
AORTIC VALVE CUSP SEPERATION: 0.8 CM
APTT PPP: 22.9 SEC (ref 21–32)
AST SERPL-CCNC: 13 U/L (ref 10–40)
AV INDEX (PROSTH): 0.54
AV MEAN GRADIENT: 20 MMHG
AV PEAK GRADIENT: 35 MMHG
AV VALVE AREA BY VELOCITY RATIO: 1.06 CM²
AV VALVE AREA: 1.22 CM²
AV VELOCITY RATIO: 0.47
BACTERIA #/AREA URNS HPF: ABNORMAL /HPF
BASOPHILS # BLD AUTO: 0.06 K/UL (ref 0–0.2)
BASOPHILS NFR BLD: 1 % (ref 0–1.9)
BILIRUB SERPL-MCNC: 0.6 MG/DL (ref 0.1–1)
BILIRUB UR QL STRIP: NEGATIVE
BLD PROD TYP BPU: NORMAL
BLD PROD TYP BPU: NORMAL
BLOOD UNIT EXPIRATION DATE: NORMAL
BLOOD UNIT EXPIRATION DATE: NORMAL
BLOOD UNIT TYPE CODE: 5100
BLOOD UNIT TYPE CODE: 5100
BLOOD UNIT TYPE: NORMAL
BLOOD UNIT TYPE: NORMAL
BSA FOR ECHO PROCEDURE: 1.37 M2
BUN SERPL-MCNC: 16 MG/DL (ref 10–30)
CALCIUM SERPL-MCNC: 8 MG/DL (ref 8.7–10.5)
CHLORIDE SERPL-SCNC: 108 MMOL/L (ref 95–110)
CLARITY UR: ABNORMAL
CO2 SERPL-SCNC: 26 MMOL/L (ref 23–29)
CODING SYSTEM: NORMAL
CODING SYSTEM: NORMAL
COLOR UR: COLORLESS
CREAT SERPL-MCNC: 0.6 MG/DL (ref 0.5–1.4)
CROSSMATCH INTERPRETATION: NORMAL
CROSSMATCH INTERPRETATION: NORMAL
CV ECHO LV RWT: 0.58 CM
DIFFERENTIAL METHOD BLD: ABNORMAL
DISPENSE STATUS: NORMAL
DISPENSE STATUS: NORMAL
DOP CALC AO PEAK VEL: 2.97 M/S
DOP CALC AO VTI: 61.2 CM
DOP CALC LVOT AREA: 2.3 CM2
DOP CALC LVOT DIAMETER: 1.7 CM
DOP CALC LVOT PEAK VEL: 1.39 M/S
DOP CALC LVOT STROKE VOLUME: 74.87 CM3
DOP CALC MV VTI: 29.1 CM
DOP CALCLVOT PEAK VEL VTI: 33 CM
E WAVE DECELERATION TIME: 208 MSEC
E/A RATIO: 0.81
E/E' RATIO: 14 M/S
ECHO LV POSTERIOR WALL: 1.1 CM (ref 0.6–1.1)
EOSINOPHIL # BLD AUTO: 0.2 K/UL (ref 0–0.5)
EOSINOPHIL NFR BLD: 3.3 % (ref 0–8)
ERYTHROCYTE [DISTWIDTH] IN BLOOD BY AUTOMATED COUNT: 23.8 % (ref 11.5–14.5)
EST. GFR  (NO RACE VARIABLE): >60 ML/MIN/1.73 M^2
ESTIMATED AVG GLUCOSE: 114 MG/DL (ref 68–131)
FERRITIN SERPL-MCNC: 5.3 NG/ML (ref 20–300)
FERRITIN SERPL-MCNC: 6.7 NG/ML (ref 20–300)
FRACTIONAL SHORTENING: 29 % (ref 28–44)
GLUCOSE SERPL-MCNC: 93 MG/DL (ref 70–110)
GLUCOSE UR QL STRIP: NEGATIVE
HBA1C MFR BLD: 5.6 % (ref 4.5–6.2)
HCT VFR BLD AUTO: 26.9 % (ref 37–48.5)
HCT VFR BLD AUTO: 31.2 % (ref 37–48.5)
HGB BLD-MCNC: 7.8 G/DL (ref 12–16)
HGB BLD-MCNC: 9 G/DL (ref 12–16)
HGB UR QL STRIP: NEGATIVE
HYPOCHROMIA BLD QL SMEAR: ABNORMAL
IMM GRANULOCYTES # BLD AUTO: 0.01 K/UL (ref 0–0.04)
IMM GRANULOCYTES NFR BLD AUTO: 0.2 % (ref 0–0.5)
INR PPP: 0.9 (ref 0.8–1.2)
INTERVENTRICULAR SEPTUM: 1 CM (ref 0.6–1.1)
IRON SERPL-MCNC: 15 UG/DL (ref 30–160)
IRON SERPL-MCNC: 17 UG/DL (ref 30–160)
IVC DIAMETER: 1.3 CM
KETONES UR QL STRIP: NEGATIVE
LEFT INTERNAL DIMENSION IN SYSTOLE: 2.7 CM (ref 2.1–4)
LEFT VENTRICLE DIASTOLIC VOLUME INDEX: 44.6 ML/M2
LEFT VENTRICLE DIASTOLIC VOLUME: 62 ML
LEFT VENTRICLE MASS INDEX: 91 G/M2
LEFT VENTRICLE SYSTOLIC VOLUME INDEX: 19.4 ML/M2
LEFT VENTRICLE SYSTOLIC VOLUME: 27 ML
LEFT VENTRICULAR INTERNAL DIMENSION IN DIASTOLE: 3.8 CM (ref 3.5–6)
LEFT VENTRICULAR MASS: 125.82 G
LEUKOCYTE ESTERASE UR QL STRIP: ABNORMAL
LV LATERAL E/E' RATIO: 13.13 M/S
LV SEPTAL E/E' RATIO: 15 M/S
LVOT MG: 5 MMHG
LVOT MV: 1.01 CM/S
LYMPHOCYTES # BLD AUTO: 0.9 K/UL (ref 1–4.8)
LYMPHOCYTES NFR BLD: 14.3 % (ref 18–48)
MAGNESIUM SERPL-MCNC: 2.1 MG/DL (ref 1.6–2.6)
MCH RBC QN AUTO: 21 PG (ref 27–31)
MCHC RBC AUTO-ENTMCNC: 29 G/DL (ref 32–36)
MCV RBC AUTO: 73 FL (ref 82–98)
MICROSCOPIC COMMENT: ABNORMAL
MONOCYTES # BLD AUTO: 0.4 K/UL (ref 0.3–1)
MONOCYTES NFR BLD: 6.7 % (ref 4–15)
MV MEAN GRADIENT: 3 MMHG
MV PEAK A VEL: 1.3 M/S
MV PEAK E VEL: 1.05 M/S
MV PEAK GRADIENT: 7 MMHG
MV VALVE AREA BY CONTINUITY EQUATION: 2.57 CM2
NEUTROPHILS # BLD AUTO: 4.7 K/UL (ref 1.8–7.7)
NEUTROPHILS NFR BLD: 74.5 % (ref 38–73)
NITRITE UR QL STRIP: NEGATIVE
NRBC BLD-RTO: 0 /100 WBC
NUM UNITS TRANS PACKED RBC: NORMAL
NUM UNITS TRANS PACKED RBC: NORMAL
OHS LV EJECTION FRACTION SIMPSONS BIPLANE MOD: 49 %
PH UR STRIP: 7 [PH] (ref 5–8)
PISA MRMAX VEL: 2.9 M/S
PISA TR MAX VEL: 2.92 M/S
PLATELET # BLD AUTO: 397 K/UL (ref 150–450)
PLATELET BLD QL SMEAR: ABNORMAL
PMV BLD AUTO: 8.5 FL (ref 9.2–12.9)
POTASSIUM SERPL-SCNC: 4.3 MMOL/L (ref 3.5–5.1)
PROT SERPL-MCNC: 6.1 G/DL (ref 6–8.4)
PROT UR QL STRIP: NEGATIVE
PROTHROMBIN TIME: 10.4 SEC (ref 9–12.5)
PV MV: 0.78 M/S
PV PEAK GRADIENT: 5 MMHG
PV PEAK VELOCITY: 1.15 M/S
RA PRESSURE ESTIMATED: 3 MMHG
RBC # BLD AUTO: 3.71 M/UL (ref 4–5.4)
RBC #/AREA URNS HPF: 3 /HPF (ref 0–4)
RV TB RVSP: 6 MMHG
RV TISSUE DOPPLER FREE WALL SYSTOLIC VELOCITY 1 (APICAL 4 CHAMBER VIEW): 15.7 CM/S
SATURATED IRON: 4 % (ref 20–50)
SATURATED IRON: 4 % (ref 20–50)
SODIUM SERPL-SCNC: 142 MMOL/L (ref 136–145)
SP GR UR STRIP: 1.01 (ref 1–1.03)
T4 FREE SERPL-MCNC: 0.78 NG/DL (ref 0.71–1.51)
TDI LATERAL: 0.08 M/S
TDI SEPTAL: 0.07 M/S
TDI: 0.08 M/S
TOTAL IRON BINDING CAPACITY: 420 UG/DL (ref 250–450)
TOTAL IRON BINDING CAPACITY: 438 UG/DL (ref 250–450)
TR MAX PG: 34 MMHG
TRANSFERRIN SERPL-MCNC: 300 MG/DL (ref 200–375)
TRANSFERRIN SERPL-MCNC: 313 MG/DL (ref 200–375)
TRICUSPID ANNULAR PLANE SYSTOLIC EXCURSION: 3.36 CM
TROPONIN I SERPL HS-MCNC: 15.6 PG/ML (ref 0–14.9)
TROPONIN I SERPL HS-MCNC: 19.1 PG/ML (ref 0–14.9)
TSH SERPL DL<=0.005 MIU/L-ACNC: 1.6 UIU/ML (ref 0.34–5.6)
TV REST PULMONARY ARTERY PRESSURE: 37 MMHG
URN SPEC COLLECT METH UR: ABNORMAL
UROBILINOGEN UR STRIP-ACNC: ABNORMAL EU/DL
VIT B12 SERPL-MCNC: 154 PG/ML (ref 210–950)
WBC # BLD AUTO: 6.28 K/UL (ref 3.9–12.7)
WBC #/AREA URNS HPF: 19 /HPF (ref 0–5)
Z-SCORE OF LEFT VENTRICULAR DIMENSION IN END DIASTOLE: -1.32
Z-SCORE OF LEFT VENTRICULAR DIMENSION IN END SYSTOLE: 0.02

## 2024-04-27 PROCEDURE — 85014 HEMATOCRIT: CPT

## 2024-04-27 PROCEDURE — 97165 OT EVAL LOW COMPLEX 30 MIN: CPT

## 2024-04-27 PROCEDURE — 85610 PROTHROMBIN TIME: CPT

## 2024-04-27 PROCEDURE — 82607 VITAMIN B-12: CPT

## 2024-04-27 PROCEDURE — 82728 ASSAY OF FERRITIN: CPT | Mod: 91

## 2024-04-27 PROCEDURE — 81001 URINALYSIS AUTO W/SCOPE: CPT

## 2024-04-27 PROCEDURE — C9113 INJ PANTOPRAZOLE SODIUM, VIA: HCPCS

## 2024-04-27 PROCEDURE — 96374 THER/PROPH/DIAG INJ IV PUSH: CPT

## 2024-04-27 PROCEDURE — 97162 PT EVAL MOD COMPLEX 30 MIN: CPT

## 2024-04-27 PROCEDURE — 80053 COMPREHEN METABOLIC PANEL: CPT

## 2024-04-27 PROCEDURE — 25000003 PHARM REV CODE 250

## 2024-04-27 PROCEDURE — 85018 HEMOGLOBIN: CPT

## 2024-04-27 PROCEDURE — 93306 TTE W/DOPPLER COMPLETE: CPT

## 2024-04-27 PROCEDURE — 84439 ASSAY OF FREE THYROXINE: CPT

## 2024-04-27 PROCEDURE — 63600175 PHARM REV CODE 636 W HCPCS

## 2024-04-27 PROCEDURE — 83036 HEMOGLOBIN GLYCOSYLATED A1C: CPT

## 2024-04-27 PROCEDURE — 87077 CULTURE AEROBIC IDENTIFY: CPT

## 2024-04-27 PROCEDURE — 12000002 HC ACUTE/MED SURGE SEMI-PRIVATE ROOM

## 2024-04-27 PROCEDURE — 63600175 PHARM REV CODE 636 W HCPCS: Performed by: INTERNAL MEDICINE

## 2024-04-27 PROCEDURE — 25000003 PHARM REV CODE 250: Performed by: INTERNAL MEDICINE

## 2024-04-27 PROCEDURE — 36415 COLL VENOUS BLD VENIPUNCTURE: CPT

## 2024-04-27 PROCEDURE — 84484 ASSAY OF TROPONIN QUANT: CPT | Mod: 91

## 2024-04-27 PROCEDURE — 85025 COMPLETE CBC W/AUTO DIFF WBC: CPT

## 2024-04-27 PROCEDURE — P9040 RBC LEUKOREDUCED IRRADIATED: HCPCS

## 2024-04-27 PROCEDURE — 84443 ASSAY THYROID STIM HORMONE: CPT

## 2024-04-27 PROCEDURE — 83540 ASSAY OF IRON: CPT | Mod: 91

## 2024-04-27 PROCEDURE — 84484 ASSAY OF TROPONIN QUANT: CPT

## 2024-04-27 PROCEDURE — 82728 ASSAY OF FERRITIN: CPT | Performed by: STUDENT IN AN ORGANIZED HEALTH CARE EDUCATION/TRAINING PROGRAM

## 2024-04-27 PROCEDURE — 83735 ASSAY OF MAGNESIUM: CPT

## 2024-04-27 PROCEDURE — 93306 TTE W/DOPPLER COMPLETE: CPT | Mod: 26,,, | Performed by: INTERNAL MEDICINE

## 2024-04-27 PROCEDURE — 36415 COLL VENOUS BLD VENIPUNCTURE: CPT | Performed by: STUDENT IN AN ORGANIZED HEALTH CARE EDUCATION/TRAINING PROGRAM

## 2024-04-27 PROCEDURE — 87086 URINE CULTURE/COLONY COUNT: CPT

## 2024-04-27 PROCEDURE — 85730 THROMBOPLASTIN TIME PARTIAL: CPT

## 2024-04-27 PROCEDURE — 87186 SC STD MICRODIL/AGAR DIL: CPT

## 2024-04-27 PROCEDURE — 83540 ASSAY OF IRON: CPT | Performed by: STUDENT IN AN ORGANIZED HEALTH CARE EDUCATION/TRAINING PROGRAM

## 2024-04-27 RX ORDER — LORAZEPAM 2 MG/ML
0.2 INJECTION INTRAMUSCULAR ONCE
Status: DISCONTINUED | OUTPATIENT
Start: 2024-04-27 | End: 2024-04-29 | Stop reason: HOSPADM

## 2024-04-27 RX ORDER — LORAZEPAM 2 MG/ML
0.2 INJECTION INTRAMUSCULAR ONCE
Status: CANCELLED | OUTPATIENT
Start: 2024-04-27

## 2024-04-27 RX ADMIN — Medication 6 MG: at 08:04

## 2024-04-27 RX ADMIN — SODIUM CHLORIDE 125 MG: 9 INJECTION, SOLUTION INTRAVENOUS at 04:04

## 2024-04-27 RX ADMIN — QUETIAPINE 100 MG: 100 TABLET ORAL at 08:04

## 2024-04-27 RX ADMIN — PANTOPRAZOLE SODIUM 40 MG: 40 INJECTION, POWDER, FOR SOLUTION INTRAVENOUS at 08:04

## 2024-04-27 RX ADMIN — PANTOPRAZOLE SODIUM 40 MG: 40 INJECTION, POWDER, FOR SOLUTION INTRAVENOUS at 10:04

## 2024-04-27 NOTE — PLAN OF CARE
[facility] - Emergency Dept  Initial Discharge Assessment       Primary Care Provider: Dallin Pleitez MD    Admission Diagnosis: Syncope [R55]    Admission Date: 4/26/2024  Expected Discharge Date:     Social work intern completed discharge assessment with pt at bedside. Pt was in an altered mental state.  I completed initial discharge planning to the best of her knowledge. Her relative listed in Emergency Contact lives in the [place].  I called her local contact, a neighbor, and left a message. Pt lives at [facility]- [phone]; her med nurse does not work on weekends, will be back on Monday to confirm pt information unanswered in initial assessment. Demographics, PCP, and insurance verified. No home health. Unknown if on dialysis. Unknown if Pt completes ADLs without assistance. Pt to discharge back to [facility]. Pt has no other needs to be addressed at this time.    Case Management to follow up as needed.      Payor: [employer] MEDICARE / Plan: [employer] MEDICARE HMO / Product Type: Capitation /     Extended Emergency Contact Information  Primary Emergency Contact: Gurinder Pérez  Address: [address] [place] [address]  Home Phone: [phone]  Mobile Phone: [phone]  Relation: Relative   needed? No  Secondary Emergency Contact: JAYLEN BAE  Mobile Phone: [phone]  Relation: Neighbor  Preferred language: English   needed? No    Discharge Plan A: Return to nursing home  Discharge Plan B: Return to Nursing Home      [id number] [place] - [address] [facility]  [address]  [place] [address]  Phone: [phone] Fax: [phone]    [facility] [place] - [address]  [address]  [place] [address]  Phone: [phone] Fax: [phone]      Initial Assessment (most recent)       Adult Discharge Assessment - 04/27/24  0540          Discharge Assessment    Assessment Type Discharge Planning Assessment     Confirmed/corrected address, phone number and insurance Yes     Confirmed Demographics Correct on Facesheet     Source of Information patient     Does patient/caregiver understand observation status Yes     Reason For Admission Syncope     People in Home alone     Facility Arrived From: [facility] [phone]     Do you expect to return to your current living situation? Yes     Do you have help at home or someone to help you manage your care at home? Yes     Who are your caregiver(s) and their phone number(s)? Staff at [facility] [phone]     Prior to hospitilization cognitive status: Alert/Oriented     Home Accessibility wheelchair accessible     Home Layout Able to live on 1st floor     Readmission within 30 days? No     Do you have prescription coverage? Yes     Coverage HU[employer]NA MANAGED MEDICARE - HU[employer]NA MEDICARE HMO - CAPITATED     Discharge Plan A Return to nursing home     Discharge Plan B Return to Nursing Home     DME Needed Upon Discharge  none

## 2024-04-27 NOTE — CONSULTS
Consult Note  Gastroenterology/Hepatology    Consult Requested By: ER  Reason for Consult: anemia    SUBJECTIVE:     History of Present Illness: This is a very pleasant 89yo F with dementia, nursing home patient, sent to ED yesterday after having syncopal episode and LOC for 45 seconds.  Lab workup revealed low Hgb of 5.  Patient was transfused 2U pRBC with improvement in Hgb to 7.7.  Patient is unable to give any meaningful history and no family is present.  There has been no witnessed GI bleeding.  Per report from the nursing home, patient has been known to be anemic for several months and family wanted to try oral iron replacement to treat the anemia as opposed to doing anything invasive.  Currently, patient is sitting up and conversant.  She is in good spirits.      Review of patient's allergies indicates:  No Known Allergies    Past Medical History:   Diagnosis Date    Dementia     Hip fracture     L     Past Surgical History:   Procedure Laterality Date    PERCUTANEOUS PINNING OF HIP Right 11/22/2021    Procedure: PINNING, HIP, PERCUTANEOUS;  Surgeon: Navneet Rios MD;  Location: [facility];  Service: Orthopedics;  Laterality: Right;     Family History   Problem Relation Name Age of Onset    Heart disease Neg Hx       Social History     Tobacco Use    Smoking status: Never    Smokeless tobacco: Never   Substance Use Topics    Alcohol use: No    Drug use: Never       Review of Systems:  ROS negative except as stated above in HPI    OBJECTIVE:     Vital Signs:  Pulse:  [66-78]   Resp:  [13-21]   BP: (107-146)/(55-86)   SpO2:  [98 %]     Physical Exam:  General: well developed, well nourished  Eyes: conjunctivae/corneas clear. PERRL..  HENT: Head:normocephalic, atraumatic. Ears:hearing grossly normal bilaterally. Nose: Nares normal. Septum midline. Mucosa normal. No drainage or sinus tenderness., no discharge. Throat: lips, mucosa, and tongue normal; teeth and gums normal and no throat erythema.  Neck: supple,  symmetrical, trachea midline, no JVD and thyroid not enlarged, symmetric, no tenderness/mass/nodules  Lungs:  clear to auscultation bilaterally and normal respiratory effort  Cardiovascular: Heart: regular rate and rhythm, S1, S2 normal, no murmur, click, rub or gallop. Chest Wall: no tenderness. Extremities: no cyanosis or edema, or clubbing. Pulses: 2+ and symmetric.  Abdomen/Rectal: Abdomen: soft, non-tender non-distented; bowel sounds normal; no masses,  no organomegaly. Rectal: not examined  Skin: Skin color, texture, turgor normal. No rashes or lesions  Musculoskeletal:normal gait and no clubbing, cyanosis  Lymph Nodes: No cervical or supraclavicular adenopathy  Neurologic: Normal strength and tone. No focal numbness or weakness  Psych/Behavioral:  Normal. and Alert and oriented, appropriate affect.      Laboratory and Radiology Data:  CBC:   Recent Labs   Lab 04/27/24 0821   WBC 6.28   RBC 3.71*   HGB 7.8*   HCT 26.9*      MCV 73*   MCH 21.0*   MCHC 29.0*     BMP:   Recent Labs   Lab 04/27/24  0821   GLU 93      K 4.3      CO2 26   BUN 16   CREATININE 0.6   CALCIUM 8.0*   MG 2.1     CMP:   Recent Labs   Lab 04/27/24  0821   GLU 93   CALCIUM 8.0*   ALBUMIN 3.5   PROT 6.1      K 4.3   CO2 26      BUN 16   CREATININE 0.6   ALKPHOS 55   ALT 6*   AST 13   BILITOT 0.6     LFTs:   Recent Labs   Lab 04/27/24 0821   ALT 6*   AST 13   ALKPHOS 55   BILITOT 0.6   PROT 6.1   ALBUMIN 3.5     Coagulation:   Recent Labs   Lab 04/27/24 0821   LABPROT 10.4   INR 0.9   APTT 22.9       ASSESSMENT/PLAN:   This is a very pleasant 89yo F with above med hx who was brought to ER from nursing home after syncopal episode.  Found to have Hgb of 5.  Reportedly has been known to be anemic for several months and has been treated with oral iron.  No report of any signs/symptoms of GI bleeding.        Plan:  Iron def anemia- severe iron def anemia.  Treated with 2U pRBC with improvement in Hgb from 5 to 7.7.   No signs of GI bleeding.  Will start IV iron replacement to treat severe iron deficiency.  Will continue to monitor Hgb.  No emergent need for endoscopy.  Endoscopy could be considered for further workup but it is unclear if family wants any invasive procedures at this time.  Will continue to monitor her clinically and will try to engage family to discuss whether endoscopy is wanted.       Thank you very much for the consult.  I will continue to follow.  Please do not hesitate to contact me with any questions or concerns.    Toni Carreon MD

## 2024-04-27 NOTE — ASSESSMENT & PLAN NOTE
May be 2/2 symptomatic anemia vs valvular disease vs arrhythmia     Echo   Orthostatic VS BID  Fall precautions  EKG PRN  Tele monitoring  No therapy per PT

## 2024-04-27 NOTE — PLAN OF CARE
To be met by 5-4-24  1. Patient will perform grooming with mod I  2. Patient will perform toileting with sba  3. Patient will perform UE dressing with sba  4. Patient will perform LE dressing with sba

## 2024-04-27 NOTE — PT/OT/SLP EVAL
Physical Therapy Evaluation and Discharge Note    Patient Name:  Keila Romano   MRN:  [id number]    Recommendations:     Discharge Recommendations: No Therapy Indicated  Discharge Equipment Recommendations:   none   Barriers to discharge: None    Assessment:     Keila Romano is a 89 y.o. female admitted with a medical diagnosis of Syncope. .  At this time, patient is functioning at their prior level of function and does not require further acute PT services.     Recent Surgery: * No surgery found *      Plan:     During this hospitalization, patient does not require further acute PT services.  Please re-consult if situation changes.      Subjective     Chief Complaint: none   Patient/Family Comments/goals: Did not state  Pain/Comfort:  Pain Rating 1: 0/10    Patients cultural, spiritual, Sabianism conflicts given the current situation:      Living Environment:  Pt is a resident of [facility]  Prior to admission, patients level of function was at least supervision .  Equipment used at home:  (Equipment provided by [facility]).  DME owned (not currently used):  Pt could not provide this info due to  confusion  .  Upon discharge, patient will have assistance from facility.    Objective:     Communicated with nurse  prior to session.  Patient found supine with blood pressure cuff, telemetry, peripheral IV upon PT entry to room.    General Precautions: Standard, fall    Orthopedic Precautions:    Braces:    Respiratory Status: Room air    Exams:  RLE ROM: WFL  RLE Strength: WFL  LLE ROM: WFL  LLE Strength: WFL    Functional Mobility:  Bed Mobility:     Supine to Sit: stand by assistance  Sit to Supine: stand by assistance  Transfers:     Sit to Stand:  stand by assistance with hand-held assist  Gait: 20 ft RW CGA  hyperextended trunk    AM-PAC 6 CLICK MOBILITY  Total Score:22       Treatment and Education:  PT eval and DC.    AM-PAC 6 CLICK MOBILITY  Total Score:22     Patient left supine with  all lines intact and call button in reach.    GOALS:   Multidisciplinary Problems       Physical Therapy Goals       Not on file                    History:     Past Medical History:   Diagnosis Date    Dementia     Hip fracture     L       Past Surgical History:   Procedure Laterality Date    PERCUTANEOUS PINNING OF HIP Right 11/22/2021    Procedure: PINNING, HIP, PERCUTANEOUS;  Surgeon: Navneet Rios MD;  Location: [facility];  Service: Orthopedics;  Laterality: Right;       Time Tracking:     PT Received On: 04/27/24  PT Start Time: 1155     PT Stop Time: 1204  PT Total Time (min): 9 min     Billable Minutes: Evaluation 9 minutes      04/27/2024

## 2024-04-27 NOTE — PLAN OF CARE
[facility] - Emergency Dept  Initial Discharge Assessment       Primary Care Provider: Dallin Pleitez MD    Admission Diagnosis: Syncope [R55]  Chest pain [R07.9]    Admission Date: 4/26/2024  Expected Discharge Date:      completed discharge assessment with pt at bedside. Pt AAOx4s. Pt lives at [facility]. Demographics, PCP, and insurance verified. No home health. No dialysis. Pt completes ADLs without assistance. Pt to discharge home via facility transport. Pt has no other needs to be addressed at this time.     Transition of Care Barriers: None    Payor: [employer] MANAGED MEDICARE / Plan: HU[employer] MEDICARE HMO / Product Type: Capitation /     Extended Emergency Contact Information  Primary Emergency Contact: Gurinder Pérez  Address: [address] [place] [address]  Home Phone: [phone]  Mobile Phone: [phone]  Relation: Relative   needed? No  Secondary Emergency Contact: JAYLEN BAE  Mobile Phone: [phone]  Relation: Neighbor  Preferred language: English   needed? No    Discharge Plan A: Assisted Living  Discharge Plan B: Assisted Living      [id number] - [place], [place] - [address]  [address]  [place] [address]  Phone: [phone] Fax: [phone]    [facility] - [place] - [address]  [address]  [place] [address]  Phone: [phone] Fax: [phone]      Initial Assessment (most recent)       Adult Discharge Assessment - 04/27/24 1251          Discharge Assessment    Assessment Type Discharge Planning Assessment     Confirmed/corrected address, phone number and insurance Yes     Confirmed Demographics Correct on Facesheet     Source of Information patient     When was your last doctors appointment? --   1 week ago    Does patient/caregiver understand observation status Yes     Reason For  Admission Syncope     People in Home alone     Facility Arrived From: Home     Do you expect to return to your current living situation? Yes     Do you have help at home or someone to help you manage your care at home? No     Who are your caregiver(s) and their phone number(s)? Lives at [facility]     Prior to hospitilization cognitive status: Alert/Oriented     Current cognitive status: Alert/Oriented     Walking or Climbing Stairs Difficulty no     Dressing/Bathing Difficulty yes     Dressing/Bathing --     Home Accessibility wheelchair accessible     Number of Stairs, Main Entrance --     Home Layout Able to live on 1st floor     Equipment Currently Used at Home --   Equipment provided by [facility]    Readmission within 30 days? No     Patient currently being followed by outpatient case management? No     Do you currently have service(s) that help you manage your care at home? No   Lives at [facility]    Do you take prescription medications? Yes     Do you have prescription coverage? Yes     Coverage [employer] MANAGED MEDICARE - HU[employer]NA MEDICARE HMO - CAPITATED     Is the patient taking medications as prescribed? yes     Who is going to help you get home at discharge? [facility]     How do you get to doctors appointments? other (see comments)   [facility]    Are you on dialysis? No     Do you take coumadin? No     Discharge Plan A Assisted Living     Discharge Plan B Assisted Living     DME Needed Upon Discharge  none     Discharge Plan discussed with: Patient     Transition of Care Barriers None

## 2024-04-27 NOTE — SUBJECTIVE & OBJECTIVE
Interval History: seen this morning, no complaints, not sure why she is in the hospital. Hgb 7.8.     Review of Systems   Unable to perform ROS: Dementia     Objective:     Vital Signs (Most Recent):  Temp: 97.9 °F (36.6 °C) (04/27/24 0026)  Pulse: 74 (04/27/24 1400)  Resp: 17 (04/27/24 1400)  BP: 128/61 (04/27/24 1400)  SpO2: 96 % (04/27/24 1400) Vital Signs (24h Range):  Temp:  [97.9 °F (36.6 °C)-98.8 °F (37.1 °C)] 97.9 °F (36.6 °C)  Pulse:  [66-95] 74  Resp:  [13-24] 17  SpO2:  [95 %-99 %] 96 %  BP: (107-178)/(55-86) 128/61     Weight: 42.6 kg (94 lb)  Body mass index is 17.18 kg/m².    Intake/Output Summary (Last 24 hours) at 4/27/2024 1746  Last data filed at 4/27/2024 0317  Gross per 24 hour   Intake 633.92 ml   Output --   Net 633.92 ml         Physical Exam  Vitals reviewed.   Constitutional:       General: She is not in acute distress.  Cardiovascular:      Rate and Rhythm: Normal rate and regular rhythm.      Heart sounds: Murmur heard.   Pulmonary:      Effort: Pulmonary effort is normal.      Breath sounds: Normal breath sounds.   Abdominal:      General: Bowel sounds are normal.      Palpations: Abdomen is soft.   Musculoskeletal:         General: Normal range of motion.      Cervical back: Normal range of motion.   Skin:     General: Skin is warm and dry.      Coloration: Skin is pale.   Neurological:      Mental Status: She is alert. She is disoriented and confused.   Psychiatric:         Mood and Affect: Affect normal.             Significant Labs: All pertinent labs within the past 24 hours have been reviewed.  CBC:   Recent Labs   Lab 04/26/24  1240 04/27/24  0525 04/27/24  0821   WBC 9.17  --  6.28   HGB 5.0* 9.0* 7.8*   HCT 19.9* 31.2* 26.9*     --  397     CMP:   Recent Labs   Lab 04/26/24  1240 04/27/24  0821    142   K 3.7 4.3    108   CO2 26 26   * 93   BUN 21 16   CREATININE 0.8 0.6   CALCIUM 8.3* 8.0*   PROT 6.3 6.1   ALBUMIN 3.6 3.5   BILITOT 0.2 0.6   ALKPHOS 55  55   AST 11 13   ALT 6* 6*   ANIONGAP 8 8       Significant Imaging: I have reviewed all pertinent imaging results/findings within the past 24 hours.

## 2024-04-27 NOTE — ASSESSMENT & PLAN NOTE
Patient's anemia is currently uncontrolled. Has not received any PRBCs to date. Transfuse 2 units PRBCs. Etiology likely d/t Iron deficiency and blood loss, unknown chronicity  Current CBC reviewed-   Lab Results   Component Value Date    HGB 7.8 (L) 04/27/2024    HCT 26.9 (L) 04/27/2024     Monitor serial CBC and transfuse if patient becomes hemodynamically unstable, symptomatic or H/H drops below 7/21.  Occult stool  Avoid thinners  Holding home asa  Holding home lexapro and aricept 2/2 adverse effect bleeding  Protonix IV BID for now  GI consulted; Iron infusions, no need for egd now.

## 2024-04-27 NOTE — PROGRESS NOTES
[facility] Medicine  Progress Note    Patient Name: Keila Romano  MRN: [id number]  Patient Class: IP- Inpatient   Admission Date: 4/26/2024  Length of Stay: 0 days  Attending Physician: Yumi Mariscal MD  Primary Care Provider: Dallin Pleitez MD        Subjective:     Principal Problem:Syncope        HPI:  89-year-old female presented to ED via EMS from nursing home for eval s/p syncope. Patient is confused, she has dementia at baseline and is unable to provide history, history comes from ED. Patient had reported witnessed syncopal episode at nursing home and lost consciousness for about 45 seconds. Patient denied memory of the event, denied preceding symptoms. Denies current complaints. Troponin neg. . CXR  impression with enlarged cardiomediastinal silhouette. EKG with LBBB, which was also present on EKG at this facility from 2021. On exam has murmur. Noted to have hgb/hct of 5.0/19.9. Last labwork available for review is from 2 years ago, at that time hgb was 9-11. Takes asa at home, no other listed AC/AP. Per report, patient has been anemic for the last month or two but patient's family had wanted to try iron supplements prior to blood transfusions. Admit to hospital medicine for further eval.     Overview/Hospital Course:  Pt admitted for syncope. Has dementia at baseline, AAOx1. Anemic on admission requiring 2 units of blood. GI consulted, no intervention at this time.     Interval History: seen this morning, no complaints, not sure why she is in the hospital. Hgb 7.8.     Review of Systems   Unable to perform ROS: Dementia     Objective:     Vital Signs (Most Recent):  Temp: 97.9 °F (36.6 °C) (04/27/24 0026)  Pulse: 74 (04/27/24 1400)  Resp: 17 (04/27/24 1400)  BP: 128/61 (04/27/24 1400)  SpO2: 96 % (04/27/24 1400) Vital Signs (24h Range):  Temp:  [97.9 °F (36.6 °C)-98.8 °F (37.1 °C)] 97.9 °F (36.6 °C)  Pulse:  [66-95] 74  Resp:  [13-24] 17  SpO2:  [95 %-99 %] 96 %  BP:  (107-178)/(55-86) 128/61     Weight: 42.6 kg (94 lb)  Body mass index is 17.18 kg/m².    Intake/Output Summary (Last 24 hours) at 4/27/2024 1746  Last data filed at 4/27/2024 0317  Gross per 24 hour   Intake 633.92 ml   Output --   Net 633.92 ml         Physical Exam  Vitals reviewed.   Constitutional:       General: She is not in acute distress.  Cardiovascular:      Rate and Rhythm: Normal rate and regular rhythm.      Heart sounds: Murmur heard.   Pulmonary:      Effort: Pulmonary effort is normal.      Breath sounds: Normal breath sounds.   Abdominal:      General: Bowel sounds are normal.      Palpations: Abdomen is soft.   Musculoskeletal:         General: Normal range of motion.      Cervical back: Normal range of motion.   Skin:     General: Skin is warm and dry.      Coloration: Skin is pale.   Neurological:      Mental Status: She is alert. She is disoriented and confused.   Psychiatric:         Mood and Affect: Affect normal.             Significant Labs: All pertinent labs within the past 24 hours have been reviewed.  CBC:   Recent Labs   Lab 04/26/24  1240 04/27/24  0525 04/27/24  0821   WBC 9.17  --  6.28   HGB 5.0* 9.0* 7.8*   HCT 19.9* 31.2* 26.9*     --  397     CMP:   Recent Labs   Lab 04/26/24  1240 04/27/24  0821    142   K 3.7 4.3    108   CO2 26 26   * 93   BUN 21 16   CREATININE 0.8 0.6   CALCIUM 8.3* 8.0*   PROT 6.3 6.1   ALBUMIN 3.6 3.5   BILITOT 0.2 0.6   ALKPHOS 55 55   AST 11 13   ALT 6* 6*   ANIONGAP 8 8       Significant Imaging: I have reviewed all pertinent imaging results/findings within the past 24 hours.    Assessment/Plan:      * Syncope  May be 2/2 symptomatic anemia vs valvular disease vs arrhythmia     Echo   Orthostatic VS BID  Fall precautions  EKG PRN  Tele monitoring  No therapy per PT    Murmur  F/u echo      Hyperglycemia  No hx DM  A1c 5.6      Left bundle branch block  Also noted on EKG in 2021  Tele monitoring    Anemia  Patient's anemia is  currently uncontrolled. Has not received any PRBCs to date. Transfuse 2 units PRBCs. Etiology likely d/t Iron deficiency and blood loss, unknown chronicity  Current CBC reviewed-   Lab Results   Component Value Date    HGB 7.8 (L) 04/27/2024    HCT 26.9 (L) 04/27/2024     Monitor serial CBC and transfuse if patient becomes hemodynamically unstable, symptomatic or H/H drops below 7/21.  Occult stool  Avoid thinners  Holding home asa  Holding home lexapro and aricept 2/2 adverse effect bleeding  Protonix IV BID for now  GI consulted; Iron infusions, no need for egd now.      VTE Risk Mitigation (From admission, onward)           Ordered     Reason for No Pharmacological VTE Prophylaxis  Once        Question:  Reasons:  Answer:  Risk of Bleeding    04/26/24 1526     IP VTE HIGH RISK PATIENT  Once         04/26/24 1526     Place sequential compression device  Until discontinued         04/26/24 1526                    Discharge Planning   MIKI:      Code Status: Full Code   Is the patient medically ready for discharge?:     Reason for patient still in hospital (select all that apply): Patient trending condition  Discharge Plan A: Assisted Living                  Yumi Mariscal MD  Department of Hospital Medicine   [facility]

## 2024-04-27 NOTE — HOSPITAL COURSE
Pt admitted for syncope. Has dementia at baseline, AAOx1. Anemic on admission requiring 2 units of blood. GI consulted, no intervention at this time, recommended continue with iron supplements.  Hemoglobin improving.  Carotid ultrasound showed no significant stenosis.  Echocardiogram showed EF 50-52%.  Moderate-to-severe aortic valve stenosis.  We will give cardiology referral on discharge.  Patient is stable, currently at baseline.  Pending discharge back to nursing facility.  Discussed with the patient's power-of- is her nephew Gurinder, he agreed with plan.     Physical Exam  Vitals reviewed.   Constitutional:       General: She is not in acute distress.  Cardiovascular:      Rate and Rhythm: Normal rate and regular rhythm.   Pulmonary:      Effort: Pulmonary effort is normal.      Breath sounds: Normal breath sounds.

## 2024-04-28 LAB
ALBUMIN SERPL BCP-MCNC: 3.4 G/DL (ref 3.5–5.2)
ALP SERPL-CCNC: 53 U/L (ref 55–135)
ALT SERPL W/O P-5'-P-CCNC: 4 U/L (ref 10–44)
ANION GAP SERPL CALC-SCNC: 9 MMOL/L (ref 8–16)
AST SERPL-CCNC: 17 U/L (ref 10–40)
BASOPHILS # BLD AUTO: 0.06 K/UL (ref 0–0.2)
BASOPHILS NFR BLD: 1.1 % (ref 0–1.9)
BILIRUB SERPL-MCNC: 0.4 MG/DL (ref 0.1–1)
BUN SERPL-MCNC: 14 MG/DL (ref 10–30)
CALCIUM SERPL-MCNC: 8.4 MG/DL (ref 8.7–10.5)
CHLORIDE SERPL-SCNC: 108 MMOL/L (ref 95–110)
CO2 SERPL-SCNC: 23 MMOL/L (ref 23–29)
CREAT SERPL-MCNC: 0.7 MG/DL (ref 0.5–1.4)
DIFFERENTIAL METHOD BLD: ABNORMAL
EOSINOPHIL # BLD AUTO: 0.3 K/UL (ref 0–0.5)
EOSINOPHIL NFR BLD: 4.8 % (ref 0–8)
ERYTHROCYTE [DISTWIDTH] IN BLOOD BY AUTOMATED COUNT: 25.4 % (ref 11.5–14.5)
EST. GFR  (NO RACE VARIABLE): >60 ML/MIN/1.73 M^2
GLUCOSE SERPL-MCNC: 83 MG/DL (ref 70–110)
HCT VFR BLD AUTO: 31 % (ref 37–48.5)
HGB BLD-MCNC: 8.4 G/DL (ref 12–16)
IMM GRANULOCYTES # BLD AUTO: 0.02 K/UL (ref 0–0.04)
IMM GRANULOCYTES NFR BLD AUTO: 0.4 % (ref 0–0.5)
LYMPHOCYTES # BLD AUTO: 1 K/UL (ref 1–4.8)
LYMPHOCYTES NFR BLD: 18.2 % (ref 18–48)
MAGNESIUM SERPL-MCNC: 2.2 MG/DL (ref 1.6–2.6)
MCH RBC QN AUTO: 21.1 PG (ref 27–31)
MCHC RBC AUTO-ENTMCNC: 27.1 G/DL (ref 32–36)
MCV RBC AUTO: 78 FL (ref 82–98)
MONOCYTES # BLD AUTO: 0.6 K/UL (ref 0.3–1)
MONOCYTES NFR BLD: 10.3 % (ref 4–15)
NEUTROPHILS # BLD AUTO: 3.6 K/UL (ref 1.8–7.7)
NEUTROPHILS NFR BLD: 65.2 % (ref 38–73)
NRBC BLD-RTO: 0 /100 WBC
PLATELET # BLD AUTO: 244 K/UL (ref 150–450)
PMV BLD AUTO: 9.4 FL (ref 9.2–12.9)
POTASSIUM SERPL-SCNC: 4.2 MMOL/L (ref 3.5–5.1)
PROT SERPL-MCNC: 6.1 G/DL (ref 6–8.4)
RBC # BLD AUTO: 3.99 M/UL (ref 4–5.4)
SODIUM SERPL-SCNC: 140 MMOL/L (ref 136–145)
WBC # BLD AUTO: 5.44 K/UL (ref 3.9–12.7)

## 2024-04-28 PROCEDURE — 63600175 PHARM REV CODE 636 W HCPCS: Performed by: INTERNAL MEDICINE

## 2024-04-28 PROCEDURE — 25000003 PHARM REV CODE 250: Performed by: STUDENT IN AN ORGANIZED HEALTH CARE EDUCATION/TRAINING PROGRAM

## 2024-04-28 PROCEDURE — 12000002 HC ACUTE/MED SURGE SEMI-PRIVATE ROOM

## 2024-04-28 PROCEDURE — 25000003 PHARM REV CODE 250

## 2024-04-28 PROCEDURE — 36415 COLL VENOUS BLD VENIPUNCTURE: CPT

## 2024-04-28 PROCEDURE — 83735 ASSAY OF MAGNESIUM: CPT

## 2024-04-28 PROCEDURE — 80053 COMPREHEN METABOLIC PANEL: CPT

## 2024-04-28 PROCEDURE — C9113 INJ PANTOPRAZOLE SODIUM, VIA: HCPCS

## 2024-04-28 PROCEDURE — 85025 COMPLETE CBC W/AUTO DIFF WBC: CPT

## 2024-04-28 PROCEDURE — 25000003 PHARM REV CODE 250: Performed by: INTERNAL MEDICINE

## 2024-04-28 PROCEDURE — 63600175 PHARM REV CODE 636 W HCPCS

## 2024-04-28 RX ORDER — ESCITALOPRAM OXALATE 10 MG/1
10 TABLET ORAL NIGHTLY
Status: DISCONTINUED | OUTPATIENT
Start: 2024-04-28 | End: 2024-04-29 | Stop reason: HOSPADM

## 2024-04-28 RX ORDER — PANTOPRAZOLE SODIUM 40 MG/1
40 TABLET, DELAYED RELEASE ORAL DAILY
Status: DISCONTINUED | OUTPATIENT
Start: 2024-04-29 | End: 2024-04-29 | Stop reason: HOSPADM

## 2024-04-28 RX ORDER — ASPIRIN 81 MG/1
81 TABLET ORAL DAILY
Status: DISCONTINUED | OUTPATIENT
Start: 2024-04-28 | End: 2024-04-29 | Stop reason: HOSPADM

## 2024-04-28 RX ADMIN — SODIUM CHLORIDE 125 MG: 9 INJECTION, SOLUTION INTRAVENOUS at 08:04

## 2024-04-28 RX ADMIN — ESCITALOPRAM OXALATE 10 MG: 10 TABLET ORAL at 08:04

## 2024-04-28 RX ADMIN — QUETIAPINE 100 MG: 100 TABLET ORAL at 08:04

## 2024-04-28 RX ADMIN — Medication 6 MG: at 08:04

## 2024-04-28 RX ADMIN — PANTOPRAZOLE SODIUM 40 MG: 40 INJECTION, POWDER, FOR SOLUTION INTRAVENOUS at 08:04

## 2024-04-28 RX ADMIN — ASPIRIN 81 MG: 81 TABLET, COATED ORAL at 05:04

## 2024-04-28 NOTE — PROGRESS NOTES
Progress Note  Gastroenterology/Hepatology    SUBJECTIVE:     Follow-up For:  anemia    HPI/Interval history: no acute events overnight. Patient is awake, alert but not oriented.  She is conversant and in good spirits.  No family has been present.     PMH/FH/SH/Review of Systems: See H and P/Consult dated 4/26/2024    OBJECTIVE:     Vital Signs Range (Last 24H):  Temp:  [97.3 °F (36.3 °C)-98.3 °F (36.8 °C)]   Pulse:  [66-74]   Resp:  [17-19]   BP: (128-156)/(61-80)   SpO2:  [95 %-98 %]     I & O (Last 24H):  Intake/Output Summary (Last 24 hours) at 4/28/2024 1115  Last data filed at 4/28/2024 0612  Gross per 24 hour   Intake 150 ml   Output --   Net 150 ml       Physical Exam:  General: elderly WF.  NAD.  Awake, conversant.  Lungs:  clear to auscultation bilaterally and normal respiratory effort  Cardiovascular: Heart: regular rate and rhythm, S1, S2 normal, no murmur, click, rub or gallop. Chest Wall: no tenderness. Extremities: no cyanosis or edema, or clubbing. Pulses: 2+ and symmetric.  Abdomen/Rectal: Abdomen: soft, non-tender non-distented; bowel sounds normal; no masses,  no organomegaly. Rectal: not examined    Lab/X-ray Results:  CBC:   Recent Labs   Lab 04/28/24  0538   WBC 5.44   RBC 3.99*   HGB 8.4*   HCT 31.0*      MCV 78*   MCH 21.1*   MCHC 27.1*     BMP:   Recent Labs   Lab 04/28/24  0538   GLU 83      K 4.2      CO2 23   BUN 14   CREATININE 0.7   CALCIUM 8.4*   MG 2.2     CMP:   Recent Labs   Lab 04/28/24  0538   GLU 83   CALCIUM 8.4*   ALBUMIN 3.4*   PROT 6.1      K 4.2   CO2 23      BUN 14   CREATININE 0.7   ALKPHOS 53*   ALT 4*   AST 17   BILITOT 0.4     LFTs:   Recent Labs   Lab 04/28/24  0538   ALT 4*   AST 17   ALKPHOS 53*   BILITOT 0.4   PROT 6.1   ALBUMIN 3.4*       ASSESSMENT/PLAN:   This is a very pleasant 89yo F with advanced dementia, nursing home patient, admitted on 4/26 after having syncopal episode and LOC at NH for 45 seconds.  On admission, was found  to have low Hgb of 5.  Transfused with 2U pRBC with improvement to 7.7, now 8.4.  No signs/symptoms of GI bleeding here or at the NH prior to presentation.  No family present to give history or to consent to procedures.        Iron def anemia- etiology is unclear.  No signs/symptoms of overt GI bleeding, but chronic GI blood loss is certainly in the differential.  However, due to patients advanced age, advanced dementia, and lack of family present to consent for procedures, would err on the side of avoiding invasive procedures (endoscopy) unless it was absolute necessary to stop active GI bleeding.  Conservative plan would be to continue IV iron with Ferrlecit 125mg daily and continue to monitor Hgb for improvement.   Will hold off on any endoscopy for now unless patient develops signs of active bleeding.   Will sign off, but please re-consult if Hgb drops or active bleeding is observed.       Thank you very much for the consult.  Please do not hesitate to contact me with any questions or concerns.    Toni Carreon MD

## 2024-04-28 NOTE — ASSESSMENT & PLAN NOTE
Patient's anemia is currently uncontrolled. Has not received any PRBCs to date. Transfuse 2 units PRBCs. Etiology likely d/t Iron deficiency and blood loss, unknown chronicity  Current CBC reviewed-   Lab Results   Component Value Date    HGB 8.4 (L) 04/28/2024    HCT 31.0 (L) 04/28/2024     Monitor serial CBC and transfuse if patient becomes hemodynamically unstable, symptomatic or H/H drops below 7/21.  Resume aspirin, no signs of bleeding  Change Protonix to p.o. daily  GI consulted; Iron infusions, no need for egd now.  Plan on discharge once accepted back at facility

## 2024-04-28 NOTE — PROGRESS NOTES
Replaced by [facility] Medicine  Progress Note    Patient Name: Keila Romano  MRN: [id number]  Patient Class: IP- Inpatient   Admission Date: 4/26/2024  Length of Stay: 1 days  Attending Physician: Yumi Mariscal MD  Primary Care Provider: Dallin Pleitez MD        Subjective:     Principal Problem:Syncope        HPI:  89-year-old female presented to ED via EMS from nursing home for eval s/p syncope. Patient is confused, she has dementia at baseline and is unable to provide history, history comes from ED. Patient had reported witnessed syncopal episode at nursing home and lost consciousness for about 45 seconds. Patient denied memory of the event, denied preceding symptoms. Denies current complaints. Troponin neg. . CXR  impression with enlarged cardiomediastinal silhouette. EKG with LBBB, which was also present on EKG at this facility from 2021. On exam has murmur. Noted to have hgb/hct of 5.0/19.9. Last labwork available for review is from 2 years ago, at that time hgb was 9-11. Takes asa at home, no other listed AC/AP. Per report, patient has been anemic for the last month or two but patient's family had wanted to try iron supplements prior to blood transfusions. Admit to hospital medicine for further eval.     Overview/Hospital Course:  Pt admitted for syncope. Has dementia at baseline, AAOx1. Anemic on admission requiring 2 units of blood. GI consulted, no intervention at this time, recommended continue with iron supplements.  Hemoglobin improving.  Carotid ultrasound showed no significant stenosis.  Echocardiogram showed EF 50-52%.  Moderate-to-severe aortic valve stenosis.  We will give cardiology referral on discharge.  Patient is stable, currently at baseline.  Pending discharge back to nursing facility.    Interval History:  Patient seen and examined this morning, no new complaints.  Hemoglobin 8.4.  Discussed with patient's power-of-, discussed with plan to continue with  iron supplementation and hold off on endoscopy at this point.  Patient can follow up with Gastroenterology and Cardiology outpatient.    Review of Systems   Constitutional:  Negative for chills and diaphoresis.   Respiratory:  Negative for cough and shortness of breath.    Cardiovascular:  Negative for chest pain.     Objective:     Vital Signs (Most Recent):  Temp: 99 °F (37.2 °C) (04/28/24 1128)  Pulse: 84 (04/28/24 1128)  Resp: 19 (04/28/24 1128)  BP: 110/67 (04/28/24 1128)  SpO2: 96 % (04/28/24 1128) Vital Signs (24h Range):  Temp:  [97.3 °F (36.3 °C)-99 °F (37.2 °C)] 99 °F (37.2 °C)  Pulse:  [66-84] 84  Resp:  [17-19] 19  SpO2:  [95 %-96 %] 96 %  BP: (110-156)/(67-80) 110/67     Weight: 42.6 kg (94 lb)  Body mass index is 17.18 kg/m².    Intake/Output Summary (Last 24 hours) at 4/28/2024 1432  Last data filed at 4/28/2024 0857  Gross per 24 hour   Intake 450 ml   Output --   Net 450 ml         Physical Exam  Vitals reviewed.   Constitutional:       General: She is not in acute distress.  Cardiovascular:      Rate and Rhythm: Normal rate and regular rhythm.   Pulmonary:      Effort: Pulmonary effort is normal.      Breath sounds: Normal breath sounds.   Abdominal:      General: Bowel sounds are normal.      Palpations: Abdomen is soft.   Musculoskeletal:         General: Normal range of motion.      Cervical back: Normal range of motion.   Skin:     General: Skin is warm and dry.   Neurological:      Mental Status: She is alert. Mental status is at baseline. She is disoriented.   Psychiatric:         Mood and Affect: Affect normal.             Significant Labs: All pertinent labs within the past 24 hours have been reviewed.  CBC:   Recent Labs   Lab 04/27/24 0525 04/27/24 0821 04/28/24  0538   WBC  --  6.28 5.44   HGB 9.0* 7.8* 8.4*   HCT 31.2* 26.9* 31.0*   PLT  --  397 244     CMP:   Recent Labs   Lab 04/27/24 0821 04/28/24  0538    140   K 4.3 4.2    108   CO2 26 23   GLU 93 83   BUN 16 14    CREATININE 0.6 0.7   CALCIUM 8.0* 8.4*   PROT 6.1 6.1   ALBUMIN 3.5 3.4*   BILITOT 0.6 0.4   ALKPHOS 55 53*   AST 13 17   ALT 6* 4*   ANIONGAP 8 9       Significant Imaging: I have reviewed all pertinent imaging results/findings within the past 24 hours.    Assessment/Plan:      * Syncope  May be 2/2 symptomatic anemia vs valvular disease vs arrhythmia     Echo showed EF 50%, moderate to severe aortic stenosis, can follow up with Cardiology outpatient.  Given age and dementia unlikely to pursue any intervention.  No therapy per PT    Murmur  Noted      Hyperglycemia  No hx DM  A1c 5.6      Left bundle branch block  Also noted on EKG in 2021  Tele monitoring    Anemia  Patient's anemia is currently uncontrolled. Has not received any PRBCs to date. Transfuse 2 units PRBCs. Etiology likely d/t Iron deficiency and blood loss, unknown chronicity  Current CBC reviewed-   Lab Results   Component Value Date    HGB 8.4 (L) 04/28/2024    HCT 31.0 (L) 04/28/2024     Monitor serial CBC and transfuse if patient becomes hemodynamically unstable, symptomatic or H/H drops below 7/21.  Resume aspirin, no signs of bleeding  Change Protonix to p.o. daily  GI consulted; Iron infusions, no need for egd now.  Plan on discharge once accepted back at facility      VTE Risk Mitigation (From admission, onward)           Ordered     Reason for No Pharmacological VTE Prophylaxis  Once        Question:  Reasons:  Answer:  Risk of Bleeding    04/26/24 1526     IP VTE HIGH RISK PATIENT  Once         04/26/24 1526     Place sequential compression device  Until discontinued         04/26/24 1526                    Discharge Planning   MIKI:      Code Status: Full Code   Is the patient medically ready for discharge?:     Reason for patient still in hospital (select all that apply): Patient trending condition  Discharge Plan A: Assisted Living                  Yumi Mariscal MD  Department of Hospital Medicine   [facility]

## 2024-04-28 NOTE — SUBJECTIVE & OBJECTIVE
Interval History:  Patient seen and examined this morning, no new complaints.  Hemoglobin 8.4.  Discussed with patient's power-of-, discussed with plan to continue with iron supplementation and hold off on endoscopy at this point.  Patient can follow up with Gastroenterology and Cardiology outpatient.    Review of Systems   Constitutional:  Negative for chills and diaphoresis.   Respiratory:  Negative for cough and shortness of breath.    Cardiovascular:  Negative for chest pain.     Objective:     Vital Signs (Most Recent):  Temp: 99 °F (37.2 °C) (04/28/24 1128)  Pulse: 84 (04/28/24 1128)  Resp: 19 (04/28/24 1128)  BP: 110/67 (04/28/24 1128)  SpO2: 96 % (04/28/24 1128) Vital Signs (24h Range):  Temp:  [97.3 °F (36.3 °C)-99 °F (37.2 °C)] 99 °F (37.2 °C)  Pulse:  [66-84] 84  Resp:  [17-19] 19  SpO2:  [95 %-96 %] 96 %  BP: (110-156)/(67-80) 110/67     Weight: 42.6 kg (94 lb)  Body mass index is 17.18 kg/m².    Intake/Output Summary (Last 24 hours) at 4/28/2024 1432  Last data filed at 4/28/2024 0857  Gross per 24 hour   Intake 450 ml   Output --   Net 450 ml         Physical Exam  Vitals reviewed.   Constitutional:       General: She is not in acute distress.  Cardiovascular:      Rate and Rhythm: Normal rate and regular rhythm.   Pulmonary:      Effort: Pulmonary effort is normal.      Breath sounds: Normal breath sounds.   Abdominal:      General: Bowel sounds are normal.      Palpations: Abdomen is soft.   Musculoskeletal:         General: Normal range of motion.      Cervical back: Normal range of motion.   Skin:     General: Skin is warm and dry.   Neurological:      Mental Status: She is alert. Mental status is at baseline. She is disoriented.   Psychiatric:         Mood and Affect: Affect normal.             Significant Labs: All pertinent labs within the past 24 hours have been reviewed.  CBC:   Recent Labs   Lab 04/27/24  0525 04/27/24  0821 04/28/24  0538   WBC  --  6.28 5.44   HGB 9.0* 7.8* 8.4*    HCT 31.2* 26.9* 31.0*   PLT  --  397 244     CMP:   Recent Labs   Lab 04/27/24  0821 04/28/24  0538    140   K 4.3 4.2    108   CO2 26 23   GLU 93 83   BUN 16 14   CREATININE 0.6 0.7   CALCIUM 8.0* 8.4*   PROT 6.1 6.1   ALBUMIN 3.5 3.4*   BILITOT 0.6 0.4   ALKPHOS 55 53*   AST 13 17   ALT 6* 4*   ANIONGAP 8 9       Significant Imaging: I have reviewed all pertinent imaging results/findings within the past 24 hours.

## 2024-04-28 NOTE — ASSESSMENT & PLAN NOTE
May be 2/2 symptomatic anemia vs valvular disease vs arrhythmia     Echo showed EF 50%, moderate to severe aortic stenosis, can follow up with Cardiology outpatient.  Given age and dementia unlikely to pursue any intervention.  No therapy per PT

## 2024-04-29 ENCOUNTER — TELEPHONE (OUTPATIENT)
Dept: FAMILY MEDICINE | Facility: CLINIC | Age: 89
End: 2024-04-29
Payer: MEDICARE

## 2024-04-29 VITALS
RESPIRATION RATE: 17 BRPM | HEIGHT: 62 IN | OXYGEN SATURATION: 96 % | BODY MASS INDEX: 17.3 KG/M2 | SYSTOLIC BLOOD PRESSURE: 124 MMHG | WEIGHT: 94 LBS | HEART RATE: 88 BPM | TEMPERATURE: 98 F | DIASTOLIC BLOOD PRESSURE: 56 MMHG

## 2024-04-29 PROBLEM — R73.9 HYPERGLYCEMIA: Status: RESOLVED | Noted: 2024-04-26 | Resolved: 2024-04-29

## 2024-04-29 PROBLEM — R55 SYNCOPE: Status: RESOLVED | Noted: 2024-04-26 | Resolved: 2024-04-29

## 2024-04-29 LAB
BASOPHILS # BLD AUTO: 0.06 K/UL (ref 0–0.2)
BASOPHILS NFR BLD: 0.8 % (ref 0–1.9)
DIFFERENTIAL METHOD BLD: ABNORMAL
EOSINOPHIL # BLD AUTO: 0.3 K/UL (ref 0–0.5)
EOSINOPHIL NFR BLD: 4.1 % (ref 0–8)
ERYTHROCYTE [DISTWIDTH] IN BLOOD BY AUTOMATED COUNT: 26 % (ref 11.5–14.5)
HCT VFR BLD AUTO: 29.6 % (ref 37–48.5)
HGB BLD-MCNC: 8.5 G/DL (ref 12–16)
IMM GRANULOCYTES # BLD AUTO: 0.02 K/UL (ref 0–0.04)
IMM GRANULOCYTES NFR BLD AUTO: 0.3 % (ref 0–0.5)
LYMPHOCYTES # BLD AUTO: 1.3 K/UL (ref 1–4.8)
LYMPHOCYTES NFR BLD: 17.4 % (ref 18–48)
MCH RBC QN AUTO: 21.5 PG (ref 27–31)
MCHC RBC AUTO-ENTMCNC: 28.7 G/DL (ref 32–36)
MCV RBC AUTO: 75 FL (ref 82–98)
MONOCYTES # BLD AUTO: 0.5 K/UL (ref 0.3–1)
MONOCYTES NFR BLD: 6.8 % (ref 4–15)
NEUTROPHILS # BLD AUTO: 5.2 K/UL (ref 1.8–7.7)
NEUTROPHILS NFR BLD: 70.6 % (ref 38–73)
NRBC BLD-RTO: 0 /100 WBC
PLATELET # BLD AUTO: 385 K/UL (ref 150–450)
PMV BLD AUTO: 8.7 FL (ref 9.2–12.9)
RBC # BLD AUTO: 3.96 M/UL (ref 4–5.4)
WBC # BLD AUTO: 7.35 K/UL (ref 3.9–12.7)

## 2024-04-29 PROCEDURE — 63600175 PHARM REV CODE 636 W HCPCS: Performed by: INTERNAL MEDICINE

## 2024-04-29 PROCEDURE — 85025 COMPLETE CBC W/AUTO DIFF WBC: CPT

## 2024-04-29 PROCEDURE — 25000003 PHARM REV CODE 250: Performed by: INTERNAL MEDICINE

## 2024-04-29 PROCEDURE — 36415 COLL VENOUS BLD VENIPUNCTURE: CPT

## 2024-04-29 PROCEDURE — 25000003 PHARM REV CODE 250: Performed by: STUDENT IN AN ORGANIZED HEALTH CARE EDUCATION/TRAINING PROGRAM

## 2024-04-29 PROCEDURE — 97535 SELF CARE MNGMENT TRAINING: CPT

## 2024-04-29 RX ORDER — CEPHALEXIN 500 MG/1
500 CAPSULE ORAL EVERY 8 HOURS
Qty: 15 CAPSULE | Refills: 0 | Status: SHIPPED | OUTPATIENT
Start: 2024-04-29 | End: 2024-04-29

## 2024-04-29 RX ORDER — CEPHALEXIN 500 MG/1
500 CAPSULE ORAL EVERY 6 HOURS
Qty: 20 CAPSULE | Refills: 0 | Status: SHIPPED | OUTPATIENT
Start: 2024-04-29 | End: 2024-05-04

## 2024-04-29 RX ORDER — FERROUS SULFATE 325(65) MG
325 TABLET ORAL DAILY
Qty: 30 TABLET | Refills: 0 | Status: SHIPPED | OUTPATIENT
Start: 2024-04-29 | End: 2024-05-29

## 2024-04-29 RX ORDER — CEPHALEXIN 250 MG/1
500 CAPSULE ORAL EVERY 6 HOURS
Status: DISCONTINUED | OUTPATIENT
Start: 2024-04-29 | End: 2024-04-29

## 2024-04-29 RX ORDER — CEPHALEXIN 250 MG/1
500 CAPSULE ORAL EVERY 8 HOURS
Status: DISCONTINUED | OUTPATIENT
Start: 2024-04-29 | End: 2024-04-29 | Stop reason: HOSPADM

## 2024-04-29 RX ADMIN — SODIUM CHLORIDE 125 MG: 9 INJECTION, SOLUTION INTRAVENOUS at 10:04

## 2024-04-29 RX ADMIN — CEPHALEXIN 500 MG: 250 CAPSULE ORAL at 08:04

## 2024-04-29 RX ADMIN — PANTOPRAZOLE SODIUM 40 MG: 40 TABLET, DELAYED RELEASE ORAL at 08:04

## 2024-04-29 RX ADMIN — ASPIRIN 81 MG: 81 TABLET, COATED ORAL at 08:04

## 2024-04-29 NOTE — PROGRESS NOTES
Pharmacist Renal Dose Adjustment Note    Keila Romano is a 89 y.o. female being treated with the medication cephalexin 500 mg every 6 hours.    Patient Data:    Vital Signs (Most Recent):  Temp: 97.8 °F (36.6 °C) (04/29/24 0722)  Pulse: 71 (04/29/24 0722)  Resp: 17 (04/29/24 0722)  BP: (!) 116/54 (04/29/24 0722)  SpO2: (!) 93 % (04/29/24 0722) Vital Signs (72h Range):  Temp:  [97.3 °F (36.3 °C)-99 °F (37.2 °C)]   Pulse:  [66-95]   Resp:  [13-24]   BP: (107-178)/(46-86)   SpO2:  [93 %-100 %]      Recent Labs   Lab 04/26/24  1240 04/27/24  0821 04/28/24  0538   CREATININE 0.8 0.6 0.7     Serum creatinine: 0.7 mg/dL 04/28/24 0538  Estimated creatinine clearance: 34.5 mL/min    Cephalexin 500 mg every 6 hours will be changed to Cephalexin 500 mg every 8 hours per renal dosing protocol. CrCl 10-15 mL/min.    Pharmacist's Name: Fernando Ford  Pharmacist's Extension: [phone]

## 2024-04-29 NOTE — TELEPHONE ENCOUNTER
Ryann Martinez, RN  [facility] Pleitez Staff  Caller: Unspecified (Today, 12:58 PM)  Hi, patient being discharged from hospital today and needs a follow up visit within 3 days with Dr. Pleitez, please contact [facility] to schedule follow up visit.  [phone]. Thank you    Patient still admitted. Remind me created.

## 2024-04-29 NOTE — DISCHARGE SUMMARY
[facility] Medicine  Discharge Summary      Patient Name: Keila Romano  MRN: [id number]  SUSIE: [id number]  Patient Class: IP- Inpatient  Admission Date: 4/26/2024  Hospital Length of Stay: 2 days  Discharge Date and Time: 4/29/2024  2:45 PM  Attending Physician: No att. providers found   Discharging Provider: Yumi Mariscal MD  Primary Care Provider: Dallin Pleitez MD    Primary Care Team: Networked reference to record PCT     HPI:   89-year-old female presented to ED via EMS from nursing home for eval s/p syncope. Patient is confused, she has dementia at baseline and is unable to provide history, history comes from ED. Patient had reported witnessed syncopal episode at nursing home and lost consciousness for about 45 seconds. Patient denied memory of the event, denied preceding symptoms. Denies current complaints. Troponin neg. . CXR  impression with enlarged cardiomediastinal silhouette. EKG with LBBB, which was also present on EKG at this facility from 2021. On exam has murmur. Noted to have hgb/hct of 5.0/19.9. Last labwork available for review is from 2 years ago, at that time hgb was 9-11. Takes asa at home, no other listed AC/AP. Per report, patient has been anemic for the last month or two but patient's family had wanted to try iron supplements prior to blood transfusions. Admit to hospital medicine for further eval.     * No surgery found *      Hospital Course:   Pt admitted for syncope. Has dementia at baseline, AAOx1. Anemic on admission requiring 2 units of blood. GI consulted, no intervention at this time, recommended continue with iron supplements.  Hemoglobin improving.  Carotid ultrasound showed no significant stenosis.  Echocardiogram showed EF 50-52%.  Moderate-to-severe aortic valve stenosis.  We will give cardiology referral on discharge.  Patient is stable, currently at baseline.  Pending discharge back to nursing facility.  Discussed with the patient's  power-of- is her nephew Gurinder, he agreed with plan.     Physical Exam  Vitals reviewed.   Constitutional:       General: She is not in acute distress.  Cardiovascular:      Rate and Rhythm: Normal rate and regular rhythm.   Pulmonary:      Effort: Pulmonary effort is normal.      Breath sounds: Normal breath sounds.        Goals of Care Treatment Preferences:  Code Status: Full Code    Living Will: Yes              Consults:   Consults (From admission, onward)          Status Ordering Provider     Inpatient consult to Gastroenterology  Once        Provider:  Toni Carreon MD    Completed OPAL FRAGOSO     Case Management/  Once        Provider:  (Not yet assigned)    NORA Muniz            No new Assessment & Plan notes have been filed under this hospital service since the last note was generated.  Service: Hospital Medicine    Final Active Diagnoses:    Diagnosis Date Noted POA    Left bundle branch block [I44.7] 04/26/2024 Yes    Murmur [R01.1] 04/26/2024 Yes    Anemia [D64.9] 11/21/2021 Yes      Problems Resolved During this Admission:    Diagnosis Date Noted Date Resolved POA    PRINCIPAL PROBLEM:  Syncope [R55] 04/26/2024 04/29/2024 Yes    Hyperglycemia [R73.9] 04/26/2024 04/29/2024 Yes       Discharged Condition: good    Disposition: Skilled Nursing Facility    Follow Up:   Follow-up Information       Dallin Pleitez MD Follow up in 3 day(s).    Specialties: Family Medicine, Home Health Services, Hospice Services  Why: Message sent to office requesting they contact [facility] to schedule follow up visit.  Contact information:  [address] [address]  [phone]               Toni Carreon MD. Call in 2 week(s).    Specialty: Gastroenterology  Why: Patient / [facility] to call office and schedule follow up visit to be seen within 2 weeks of discharge.  Contact information:  [address] [place]  [address]  [phone]                           Patient Instructions:      Ambulatory referral/consult to Cardiology   Standing Status: Future   Referral Priority: Routine Referral Type: Consultation   Referral Reason: Specialty Services Required   Requested Specialty: Cardiology   Number of Visits Requested: 1     Diet Cardiac     Notify your health care provider if you experience any of the following:  temperature >100.4     Notify your health care provider if you experience any of the following:  persistent nausea and vomiting or diarrhea     Notify your health care provider if you experience any of the following:  severe uncontrolled pain     Notify your health care provider if you experience any of the following:  redness, tenderness, or signs of infection (pain, swelling, redness, odor or green/yellow discharge around incision site)     Activity as tolerated       Significant Diagnostic Studies: Labs: CMP   Recent Labs   Lab 04/28/24  0538      K 4.2      CO2 23   GLU 83   BUN 14   CREATININE 0.7   CALCIUM 8.4*   PROT 6.1   ALBUMIN 3.4*   BILITOT 0.4   ALKPHOS 53*   AST 17   ALT 4*   ANIONGAP 9    and CBC   Recent Labs   Lab 04/28/24  0538 04/29/24  0441   WBC 5.44 7.35   HGB 8.4* 8.5*   HCT 31.0* 29.6*    385       Pending Diagnostic Studies:       None           Medications:  Reconciled Home Medications:      Medication List        START taking these medications      cephALEXin 500 MG capsule  Commonly known as: KEFLEX  Take 1 capsule (500 mg total) by mouth every 6 (six) hours. for 5 days     ferrous sulfate 325 mg (65 mg iron) Tab tablet  Commonly known as: FEOSOL  Take 1 tablet (325 mg total) by mouth once daily.            CONTINUE taking these medications      aspirin 81 MG EC tablet  Commonly known as: ECOTRIN  Take 1 tablet (81 mg total) by mouth once daily.     donepeziL 5 MG tablet  Commonly known as: ARICEPT  Take 1 tablet (5 mg total) by mouth once daily.     EScitalopram oxalate  10 MG tablet  Commonly known as: LEXAPRO  Take 1 tablet (10 mg total) by mouth every evening.     PAIN RELIEF (ACETAMINOPHEN) 500 mg tablet  Generic drug: acetaminophen  Take 1,000 mg by mouth every evening. 19:00     QUEtiapine 100 MG Tab  Commonly known as: SEROQUEL  Take 1 tablet (100 mg total) by mouth every evening.            ASK your doctor about these medications      diclofenac sodium 1 % Gel  Commonly known as: VOLTAREN  Apply 2 g topically once daily. To painful knees     ondansetron 4 MG Tbdl  Commonly known as: ZOFRAN-ODT  Take 1 tablet (4 mg total) by mouth every 8 (eight) hours as needed (nausea).     VITRON-C 65 mg iron- 125 mg Tbec  Generic drug: iron,carbonyl-vitamin C  Take 1 tablet by mouth once daily.              Indwelling Lines/Drains at time of discharge:   Lines/Drains/Airways       None                   Time spent on the discharge of patient: 33 minutes         Yumi Mariscal MD  Department of Hospital Medicine  [facility]

## 2024-04-29 NOTE — PLAN OF CARE
Patient cleared for discharge home ([facility]) with wheelchair van transportation.        04/29/24 1315   Final Note   Assessment Type Final Discharge Note   Anticipated Discharge Disposition Home   Hospital Resources/Appts/Education Provided Appointments scheduled and added to AVS

## 2024-04-29 NOTE — PROGRESS NOTES
Report called to [facility]am[facility]. PIV removed. Pt being transported via SPD.

## 2024-04-29 NOTE — PLAN OF CARE
Spoke with Anamaria at [facility] to notify of patient's discharge from [facility] and return there. AVS faxed per request to [phone]. ADT 30 entered to arrange wheelchair van transport.

## 2024-04-29 NOTE — PT/OT/SLP PROGRESS
Occupational Therapy   Treatment/Discharge    Name: Keila Romano  MRN: [id number]  Admitting Diagnosis:  Syncope       Recommendations:     Discharge Recommendations: No Therapy Indicated  Discharge Equipment Recommendations:   (none based on today's eval except a RW)  Barriers to discharge:  None    Assessment:     Keila Romano is a 89 y.o. female with a medical diagnosis of Syncope.  She presents with return to functional baseline. Patient participated in bed mobility, LB dressing sitting EOB and ambulation  using rolling walker. Performance deficits affecting function are weakness, impaired endurance, impaired self care skills, impaired functional mobility, gait instability, impaired balance, impaired cognition, decreased safety awareness.     Rehab Prognosis:  Fair; patient would benefit from acute skilled OT services to address these deficits and reach maximum level of function.       Plan:     Patient to be seen 5 x/week to address the above listed problems via self-care/home management, therapeutic activities, therapeutic exercises  Plan of Care Expires: 05/04/24  Plan of Care Reviewed with: patient    Subjective     Chief Complaint: General weakness  Patient/Family Comments/goals: Improved functional mobility.   Pain/Comfort:  Pain Rating 1: 0/10  Pain Rating Post-Intervention 1: 0/10    Objective:     Communicated with: nurse prior to session.  Patient found HOB elevated with peripheral IV upon OT entry to room.    General Precautions: Standard, fall    Orthopedic Precautions:N/A  Braces: N/A  Respiratory Status: Room air     Occupational Performance:     Bed Mobility:    Patient completed Scooting/Bridging with stand by assistance  Patient completed Supine to Sit with stand by assistance  Patient completed Sit to Supine with stand by assistance   Performed unsupported sitting EOB with stand by assistance.    Functional Mobility/Transfers:  Patient completed Sit <> Stand Transfer with stand by  assistance  with  rolling walker   Functional Mobility: ambulated 10 feet forwards/backwards with stand by assistance using rolling walker.    Activities of Daily Living:  Lower Body Dressing: stand by assistance to don/doff socks sitting EOB.      [facility] 6 Click ADL: 18    Treatment & Education:  Patient is stand by assistance for ADLs and ambulation using a rolling walker.     Patient left HOB elevated with all lines intact, call button in reach, and bed alarm on    GOALS:   Multidisciplinary Problems       Occupational Therapy Goals          Problem: Occupational Therapy    Goal Priority Disciplines Outcome Interventions   Occupational Therapy Goal     OT, PT/OT     Description: To be met by 5-4-24  1. Patient will perform grooming with mod I  2. Patient will perform toileting with sba  3. Patient will perform UE dressing with sba  4. Patient will perform LE dressing with sba                       Time Tracking:     OT Date of Treatment: 04/29/24  OT Start Time: 1102  OT Stop Time: 1116  OT Total Time (min): 14 min    Billable Minutes:Self Care/Home Management 14    OT/BAUTISTA: OT          4/29/2024

## 2024-04-30 ENCOUNTER — PATIENT OUTREACH (OUTPATIENT)
Dept: FAMILY MEDICINE | Facility: CLINIC | Age: 89
End: 2024-04-30
Payer: MEDICARE

## 2024-04-30 LAB — BACTERIA UR CULT: ABNORMAL

## 2024-04-30 NOTE — PROGRESS NOTES
Discharge Information     Discharge Date:   4/29/24    Primary Discharge Diagnosis:  Syncope      Discharge Summary:  Reviewed      Medication & Order Review     Were medication changes made or new medications added?   Yes    If so, has the patient filled the prescriptions?  Yes     Was Home Health ordered? No    If so, has Home Health contacted patient and/or initiated services?  No    Name of Home Health Agency? N/A    Durable Medical Equipment ordered?  No     If so, has the DME provider contacted patient and delivered equipment?  N/A    Follow Up               Any problems since discharge? No    How is the patient feeling since returning home?  See below     Have you set up recommended follow up appointments?  (cardiology, surgery, etc.)    Schedule Hospital Follow-up appointment within 7-14 days (preferably 7).      Notes:  Spoke with Anamaria at [place] and states patient came back yesterday and doing great. Reports no complications. Yes, medication changes Keflex and ferrous sulfate and have been started. Patient was given blood and iron transfusions. No durable medical equipment. Unsure about home health, patient was discharged due to insurance before hospital stay. Due to follow up with Amberly and Dr. Pleitez.             Anaid Monroy

## 2024-04-30 NOTE — TELEPHONE ENCOUNTER
Spoke with Anamaria at [place] and states patient came back yesterday and doing great. Reports no complications. Yes, medication changes Keflex and ferrous sulfate and have been started. Patient was given blood and iron transfusions. No durable medical equipment. Unsure about home health, patient was discharged due to insurance before hospital stay. Due to follow up with Amberly and Dr. Pleitez.   Given to Dr. Pleitez for [place] visit.

## 2024-05-15 ENCOUNTER — HOSPITAL ENCOUNTER (OUTPATIENT)
Facility: HOSPITAL | Age: 89
End: 2024-05-17
Attending: EMERGENCY MEDICINE | Admitting: HOSPITALIST
Payer: MEDICARE

## 2024-05-15 DIAGNOSIS — R55 SYNCOPE AND COLLAPSE: ICD-10-CM

## 2024-05-15 DIAGNOSIS — R55 SYNCOPE, UNSPECIFIED SYNCOPE TYPE: Primary | ICD-10-CM

## 2024-05-15 DIAGNOSIS — R07.9 CHEST PAIN: ICD-10-CM

## 2024-05-15 DIAGNOSIS — R55 SYNCOPE: ICD-10-CM

## 2024-05-15 LAB
ALBUMIN SERPL BCP-MCNC: 3.6 G/DL (ref 3.5–5.2)
ALP SERPL-CCNC: 67 U/L (ref 55–135)
ALT SERPL W/O P-5'-P-CCNC: 9 U/L (ref 10–44)
ANION GAP SERPL CALC-SCNC: 8 MMOL/L (ref 8–16)
AST SERPL-CCNC: 18 U/L (ref 10–40)
BACTERIA #/AREA URNS HPF: ABNORMAL /HPF
BILIRUB SERPL-MCNC: 0.4 MG/DL (ref 0.1–1)
BILIRUB UR QL STRIP: NEGATIVE
BNP SERPL-MCNC: 74 PG/ML (ref 0–99)
BUN SERPL-MCNC: 27 MG/DL (ref 10–30)
CALCIUM SERPL-MCNC: 8.5 MG/DL (ref 8.7–10.5)
CHLORIDE SERPL-SCNC: 104 MMOL/L (ref 95–110)
CLARITY UR: ABNORMAL
CO2 SERPL-SCNC: 25 MMOL/L (ref 23–29)
COLOR UR: YELLOW
CREAT SERPL-MCNC: 0.7 MG/DL (ref 0.5–1.4)
EST. GFR  (NO RACE VARIABLE): >60 ML/MIN/1.73 M^2
GLUCOSE SERPL-MCNC: 134 MG/DL (ref 70–110)
GLUCOSE UR QL STRIP: NEGATIVE
HGB UR QL STRIP: NEGATIVE
KETONES UR QL STRIP: ABNORMAL
LEUKOCYTE ESTERASE UR QL STRIP: ABNORMAL
MAGNESIUM SERPL-MCNC: 1.9 MG/DL (ref 1.6–2.6)
MICROSCOPIC COMMENT: ABNORMAL
NITRITE UR QL STRIP: NEGATIVE
PH UR STRIP: 6 [PH] (ref 5–8)
POTASSIUM SERPL-SCNC: 3.8 MMOL/L (ref 3.5–5.1)
PROT SERPL-MCNC: 6.1 G/DL (ref 6–8.4)
PROT UR QL STRIP: ABNORMAL
SODIUM SERPL-SCNC: 137 MMOL/L (ref 136–145)
SP GR UR STRIP: 1.02 (ref 1–1.03)
TROPONIN I SERPL HS-MCNC: 10.5 PG/ML (ref 0–14.9)
TROPONIN I SERPL HS-MCNC: 11.1 PG/ML (ref 0–14.9)
URN SPEC COLLECT METH UR: ABNORMAL
UROBILINOGEN UR STRIP-ACNC: ABNORMAL EU/DL
WBC #/AREA URNS HPF: 9 /HPF (ref 0–5)

## 2024-05-15 PROCEDURE — 25000003 PHARM REV CODE 250: Performed by: STUDENT IN AN ORGANIZED HEALTH CARE EDUCATION/TRAINING PROGRAM

## 2024-05-15 PROCEDURE — G0378 HOSPITAL OBSERVATION PER HR: HCPCS

## 2024-05-15 PROCEDURE — 83880 ASSAY OF NATRIURETIC PEPTIDE: CPT | Performed by: STUDENT IN AN ORGANIZED HEALTH CARE EDUCATION/TRAINING PROGRAM

## 2024-05-15 PROCEDURE — 80053 COMPREHEN METABOLIC PANEL: CPT | Performed by: STUDENT IN AN ORGANIZED HEALTH CARE EDUCATION/TRAINING PROGRAM

## 2024-05-15 PROCEDURE — 83735 ASSAY OF MAGNESIUM: CPT | Performed by: STUDENT IN AN ORGANIZED HEALTH CARE EDUCATION/TRAINING PROGRAM

## 2024-05-15 PROCEDURE — 99285 EMERGENCY DEPT VISIT HI MDM: CPT | Mod: 25

## 2024-05-15 PROCEDURE — 85025 COMPLETE CBC W/AUTO DIFF WBC: CPT | Performed by: STUDENT IN AN ORGANIZED HEALTH CARE EDUCATION/TRAINING PROGRAM

## 2024-05-15 PROCEDURE — 84484 ASSAY OF TROPONIN QUANT: CPT | Mod: 91 | Performed by: STUDENT IN AN ORGANIZED HEALTH CARE EDUCATION/TRAINING PROGRAM

## 2024-05-15 PROCEDURE — 81001 URINALYSIS AUTO W/SCOPE: CPT | Performed by: STUDENT IN AN ORGANIZED HEALTH CARE EDUCATION/TRAINING PROGRAM

## 2024-05-15 PROCEDURE — 96361 HYDRATE IV INFUSION ADD-ON: CPT

## 2024-05-15 PROCEDURE — 93005 ELECTROCARDIOGRAM TRACING: CPT | Performed by: INTERNAL MEDICINE

## 2024-05-15 PROCEDURE — 93010 ELECTROCARDIOGRAM REPORT: CPT | Mod: ,,, | Performed by: INTERNAL MEDICINE

## 2024-05-15 RX ORDER — CEPHALEXIN 500 MG/1
500 CAPSULE ORAL EVERY 8 HOURS
COMMUNITY
Start: 2024-05-13

## 2024-05-15 RX ADMIN — SODIUM CHLORIDE 1000 ML: 9 INJECTION, SOLUTION INTRAVENOUS at 10:05

## 2024-05-16 PROBLEM — N39.0 UTI (URINARY TRACT INFECTION): Status: ACTIVE | Noted: 2024-05-16

## 2024-05-16 PROBLEM — R55 SYNCOPE AND COLLAPSE: Status: ACTIVE | Noted: 2024-05-16

## 2024-05-16 LAB
ALBUMIN SERPL BCP-MCNC: 3.2 G/DL (ref 3.5–5.2)
ALP SERPL-CCNC: 72 U/L (ref 55–135)
ALT SERPL W/O P-5'-P-CCNC: 12 U/L (ref 10–44)
ANION GAP SERPL CALC-SCNC: 10 MMOL/L (ref 8–16)
ANISOCYTOSIS BLD QL SMEAR: ABNORMAL
ANISOCYTOSIS BLD QL SMEAR: SLIGHT
AST SERPL-CCNC: 16 U/L (ref 10–40)
BASOPHILS # BLD AUTO: 0.04 K/UL (ref 0–0.2)
BASOPHILS # BLD AUTO: 0.06 K/UL (ref 0–0.2)
BASOPHILS NFR BLD: 0.7 % (ref 0–1.9)
BASOPHILS NFR BLD: 1.2 % (ref 0–1.9)
BILIRUB SERPL-MCNC: 0.3 MG/DL (ref 0.1–1)
BUN SERPL-MCNC: 26 MG/DL (ref 10–30)
CALCIUM SERPL-MCNC: 8.9 MG/DL (ref 8.7–10.5)
CHLORIDE SERPL-SCNC: 105 MMOL/L (ref 95–110)
CO2 SERPL-SCNC: 25 MMOL/L (ref 23–29)
CREAT SERPL-MCNC: 0.8 MG/DL (ref 0.5–1.4)
DIFFERENTIAL METHOD BLD: ABNORMAL
DIFFERENTIAL METHOD BLD: ABNORMAL
EOSINOPHIL # BLD AUTO: 0.1 K/UL (ref 0–0.5)
EOSINOPHIL # BLD AUTO: 0.1 K/UL (ref 0–0.5)
EOSINOPHIL NFR BLD: 1.3 % (ref 0–8)
EOSINOPHIL NFR BLD: 2.4 % (ref 0–8)
ERYTHROCYTE [DISTWIDTH] IN BLOOD BY AUTOMATED COUNT: ABNORMAL % (ref 11.5–14.5)
ERYTHROCYTE [DISTWIDTH] IN BLOOD BY AUTOMATED COUNT: ABNORMAL % (ref 11.5–14.5)
EST. GFR  (NO RACE VARIABLE): >60 ML/MIN/1.73 M^2
GLUCOSE SERPL-MCNC: 107 MG/DL (ref 70–110)
HCT VFR BLD AUTO: 28.9 % (ref 37–48.5)
HCT VFR BLD AUTO: 29.8 % (ref 37–48.5)
HGB BLD-MCNC: 8.4 G/DL (ref 12–16)
HGB BLD-MCNC: 8.6 G/DL (ref 12–16)
HYPOCHROMIA BLD QL SMEAR: ABNORMAL
IMM GRANULOCYTES # BLD AUTO: 0.01 K/UL (ref 0–0.04)
IMM GRANULOCYTES # BLD AUTO: 0.01 K/UL (ref 0–0.04)
IMM GRANULOCYTES NFR BLD AUTO: 0.2 % (ref 0–0.5)
IMM GRANULOCYTES NFR BLD AUTO: 0.2 % (ref 0–0.5)
LACTATE SERPL-SCNC: 2.1 MMOL/L (ref 0.5–2.2)
LYMPHOCYTES # BLD AUTO: 0.7 K/UL (ref 1–4.8)
LYMPHOCYTES # BLD AUTO: 1.3 K/UL (ref 1–4.8)
LYMPHOCYTES NFR BLD: 11.9 % (ref 18–48)
LYMPHOCYTES NFR BLD: 26.5 % (ref 18–48)
MAGNESIUM SERPL-MCNC: 2 MG/DL (ref 1.6–2.6)
MCH RBC QN AUTO: 23.5 PG (ref 27–31)
MCH RBC QN AUTO: 23.6 PG (ref 27–31)
MCHC RBC AUTO-ENTMCNC: 28.9 G/DL (ref 32–36)
MCHC RBC AUTO-ENTMCNC: 29.1 G/DL (ref 32–36)
MCV RBC AUTO: 81 FL (ref 82–98)
MCV RBC AUTO: 82 FL (ref 82–98)
MONOCYTES # BLD AUTO: 0.4 K/UL (ref 0.3–1)
MONOCYTES # BLD AUTO: 0.4 K/UL (ref 0.3–1)
MONOCYTES NFR BLD: 6.5 % (ref 4–15)
MONOCYTES NFR BLD: 7.7 % (ref 4–15)
NEUTROPHILS # BLD AUTO: 3.1 K/UL (ref 1.8–7.7)
NEUTROPHILS # BLD AUTO: 4.4 K/UL (ref 1.8–7.7)
NEUTROPHILS NFR BLD: 62 % (ref 38–73)
NEUTROPHILS NFR BLD: 79.4 % (ref 38–73)
NRBC BLD-RTO: 0 /100 WBC
NRBC BLD-RTO: 0 /100 WBC
OHS QRS DURATION: 116 MS
OHS QTC CALCULATION: 507 MS
OVALOCYTES BLD QL SMEAR: ABNORMAL
OVALOCYTES BLD QL SMEAR: ABNORMAL
PHOSPHATE SERPL-MCNC: 4.1 MG/DL (ref 2.7–4.5)
PLATELET # BLD AUTO: 308 K/UL (ref 150–450)
PLATELET # BLD AUTO: 316 K/UL (ref 150–450)
PLATELET BLD QL SMEAR: ABNORMAL
PLATELET BLD QL SMEAR: ABNORMAL
PMV BLD AUTO: 8.7 FL (ref 9.2–12.9)
PMV BLD AUTO: 8.8 FL (ref 9.2–12.9)
POIKILOCYTOSIS BLD QL SMEAR: ABNORMAL
POLYCHROMASIA BLD QL SMEAR: ABNORMAL
POTASSIUM SERPL-SCNC: 4.5 MMOL/L (ref 3.5–5.1)
PROT SERPL-MCNC: 6.2 G/DL (ref 6–8.4)
RBC # BLD AUTO: 3.58 M/UL (ref 4–5.4)
RBC # BLD AUTO: 3.64 M/UL (ref 4–5.4)
SODIUM SERPL-SCNC: 140 MMOL/L (ref 136–145)
TROPONIN I SERPL DL<=0.01 NG/ML-MCNC: <0.006 NG/ML (ref 0–0.03)
WBC # BLD AUTO: 5.05 K/UL (ref 3.9–12.7)
WBC # BLD AUTO: 5.55 K/UL (ref 3.9–12.7)

## 2024-05-16 PROCEDURE — 95819 EEG AWAKE AND ASLEEP: CPT

## 2024-05-16 PROCEDURE — 95816 EEG AWAKE AND DROWSY: CPT | Mod: 26,,, | Performed by: PSYCHIATRY & NEUROLOGY

## 2024-05-16 PROCEDURE — 94761 N-INVAS EAR/PLS OXIMETRY MLT: CPT

## 2024-05-16 PROCEDURE — 83605 ASSAY OF LACTIC ACID: CPT | Performed by: NURSE PRACTITIONER

## 2024-05-16 PROCEDURE — 25000003 PHARM REV CODE 250: Performed by: NURSE PRACTITIONER

## 2024-05-16 PROCEDURE — 97161 PT EVAL LOW COMPLEX 20 MIN: CPT

## 2024-05-16 PROCEDURE — 51702 INSERT TEMP BLADDER CATH: CPT

## 2024-05-16 PROCEDURE — 96372 THER/PROPH/DIAG INJ SC/IM: CPT | Mod: 59 | Performed by: NURSE PRACTITIONER

## 2024-05-16 PROCEDURE — 80053 COMPREHEN METABOLIC PANEL: CPT | Performed by: NURSE PRACTITIONER

## 2024-05-16 PROCEDURE — 95816 EEG AWAKE AND DROWSY: CPT

## 2024-05-16 PROCEDURE — 84484 ASSAY OF TROPONIN QUANT: CPT | Performed by: NURSE PRACTITIONER

## 2024-05-16 PROCEDURE — 97116 GAIT TRAINING THERAPY: CPT

## 2024-05-16 PROCEDURE — 63600175 PHARM REV CODE 636 W HCPCS: Performed by: NURSE PRACTITIONER

## 2024-05-16 PROCEDURE — 96365 THER/PROPH/DIAG IV INF INIT: CPT

## 2024-05-16 PROCEDURE — 84100 ASSAY OF PHOSPHORUS: CPT | Performed by: NURSE PRACTITIONER

## 2024-05-16 PROCEDURE — G0378 HOSPITAL OBSERVATION PER HR: HCPCS

## 2024-05-16 PROCEDURE — 85025 COMPLETE CBC W/AUTO DIFF WBC: CPT | Performed by: NURSE PRACTITIONER

## 2024-05-16 PROCEDURE — 83735 ASSAY OF MAGNESIUM: CPT | Performed by: NURSE PRACTITIONER

## 2024-05-16 PROCEDURE — 36415 COLL VENOUS BLD VENIPUNCTURE: CPT | Performed by: NURSE PRACTITIONER

## 2024-05-16 RX ORDER — SIMETHICONE 80 MG
1 TABLET,CHEWABLE ORAL 4 TIMES DAILY PRN
Status: DISCONTINUED | OUTPATIENT
Start: 2024-05-16 | End: 2024-05-17 | Stop reason: HOSPADM

## 2024-05-16 RX ORDER — ENOXAPARIN SODIUM 100 MG/ML
40 INJECTION SUBCUTANEOUS EVERY 24 HOURS
Status: DISCONTINUED | OUTPATIENT
Start: 2024-05-16 | End: 2024-05-17 | Stop reason: HOSPADM

## 2024-05-16 RX ORDER — IBUPROFEN 200 MG
24 TABLET ORAL
Status: DISCONTINUED | OUTPATIENT
Start: 2024-05-16 | End: 2024-05-17 | Stop reason: HOSPADM

## 2024-05-16 RX ORDER — ASPIRIN 81 MG/1
81 TABLET ORAL DAILY
Status: DISCONTINUED | OUTPATIENT
Start: 2024-05-16 | End: 2024-05-17 | Stop reason: HOSPADM

## 2024-05-16 RX ORDER — ESCITALOPRAM OXALATE 10 MG/1
10 TABLET ORAL NIGHTLY
Status: DISCONTINUED | OUTPATIENT
Start: 2024-05-16 | End: 2024-05-17 | Stop reason: HOSPADM

## 2024-05-16 RX ORDER — SODIUM,POTASSIUM PHOSPHATES 280-250MG
2 POWDER IN PACKET (EA) ORAL
Status: DISCONTINUED | OUTPATIENT
Start: 2024-05-16 | End: 2024-05-17 | Stop reason: HOSPADM

## 2024-05-16 RX ORDER — LANOLIN ALCOHOL/MO/W.PET/CERES
800 CREAM (GRAM) TOPICAL
Status: DISCONTINUED | OUTPATIENT
Start: 2024-05-16 | End: 2024-05-17 | Stop reason: HOSPADM

## 2024-05-16 RX ORDER — TALC
9 POWDER (GRAM) TOPICAL NIGHTLY PRN
Status: DISCONTINUED | OUTPATIENT
Start: 2024-05-16 | End: 2024-05-17 | Stop reason: HOSPADM

## 2024-05-16 RX ORDER — ALUMINUM HYDROXIDE, MAGNESIUM HYDROXIDE, AND SIMETHICONE 2400; 240; 2400 MG/30ML; MG/30ML; MG/30ML
15 SUSPENSION ORAL 4 TIMES DAILY PRN
Status: DISCONTINUED | OUTPATIENT
Start: 2024-05-16 | End: 2024-05-17 | Stop reason: HOSPADM

## 2024-05-16 RX ORDER — IBUPROFEN 200 MG
16 TABLET ORAL
Status: DISCONTINUED | OUTPATIENT
Start: 2024-05-16 | End: 2024-05-17 | Stop reason: HOSPADM

## 2024-05-16 RX ORDER — NALOXONE HCL 0.4 MG/ML
0.02 VIAL (ML) INJECTION
Status: DISCONTINUED | OUTPATIENT
Start: 2024-05-16 | End: 2024-05-17 | Stop reason: HOSPADM

## 2024-05-16 RX ORDER — ACETAMINOPHEN 325 MG/1
650 TABLET ORAL EVERY 8 HOURS PRN
Status: DISCONTINUED | OUTPATIENT
Start: 2024-05-16 | End: 2024-05-17 | Stop reason: HOSPADM

## 2024-05-16 RX ORDER — ACETAMINOPHEN 325 MG/1
650 TABLET ORAL EVERY 4 HOURS PRN
Status: DISCONTINUED | OUTPATIENT
Start: 2024-05-16 | End: 2024-05-17 | Stop reason: HOSPADM

## 2024-05-16 RX ORDER — DONEPEZIL HYDROCHLORIDE 5 MG/1
5 TABLET, FILM COATED ORAL DAILY
Status: DISCONTINUED | OUTPATIENT
Start: 2024-05-16 | End: 2024-05-17 | Stop reason: HOSPADM

## 2024-05-16 RX ORDER — PANTOPRAZOLE SODIUM 40 MG/1
40 TABLET, DELAYED RELEASE ORAL DAILY
Status: DISCONTINUED | OUTPATIENT
Start: 2024-05-16 | End: 2024-05-17 | Stop reason: HOSPADM

## 2024-05-16 RX ORDER — POLYETHYLENE GLYCOL 3350 17 G/17G
17 POWDER, FOR SOLUTION ORAL DAILY
Status: DISCONTINUED | OUTPATIENT
Start: 2024-05-16 | End: 2024-05-17 | Stop reason: HOSPADM

## 2024-05-16 RX ORDER — SODIUM CHLORIDE 0.9 % (FLUSH) 0.9 %
3 SYRINGE (ML) INJECTION EVERY 12 HOURS PRN
Status: DISCONTINUED | OUTPATIENT
Start: 2024-05-16 | End: 2024-05-17 | Stop reason: HOSPADM

## 2024-05-16 RX ORDER — PANTOPRAZOLE SODIUM 40 MG/10ML
40 INJECTION, POWDER, LYOPHILIZED, FOR SOLUTION INTRAVENOUS DAILY
Status: DISCONTINUED | OUTPATIENT
Start: 2024-05-16 | End: 2024-05-16

## 2024-05-16 RX ORDER — GLUCAGON 1 MG
1 KIT INJECTION
Status: DISCONTINUED | OUTPATIENT
Start: 2024-05-16 | End: 2024-05-17 | Stop reason: HOSPADM

## 2024-05-16 RX ORDER — QUETIAPINE FUMARATE 100 MG/1
100 TABLET, FILM COATED ORAL NIGHTLY
Status: DISCONTINUED | OUTPATIENT
Start: 2024-05-16 | End: 2024-05-17 | Stop reason: HOSPADM

## 2024-05-16 RX ORDER — ONDANSETRON HYDROCHLORIDE 2 MG/ML
8 INJECTION, SOLUTION INTRAVENOUS EVERY 6 HOURS PRN
Status: DISCONTINUED | OUTPATIENT
Start: 2024-05-16 | End: 2024-05-17 | Stop reason: HOSPADM

## 2024-05-16 RX ADMIN — ASPIRIN 81 MG: 81 TABLET, COATED ORAL at 08:05

## 2024-05-16 RX ADMIN — POLYETHYLENE GLYCOL 3350 17 G: 17 POWDER, FOR SOLUTION ORAL at 08:05

## 2024-05-16 RX ADMIN — QUETIAPINE 100 MG: 100 TABLET ORAL at 09:05

## 2024-05-16 RX ADMIN — ESCITALOPRAM OXALATE 10 MG: 10 TABLET, FILM COATED ORAL at 09:05

## 2024-05-16 RX ADMIN — ENOXAPARIN SODIUM 40 MG: 40 INJECTION SUBCUTANEOUS at 04:05

## 2024-05-16 RX ADMIN — PANTOPRAZOLE SODIUM 40 MG: 40 TABLET, DELAYED RELEASE ORAL at 08:05

## 2024-05-16 RX ADMIN — DONEPEZIL HYDROCHLORIDE 5 MG: 5 TABLET, FILM COATED ORAL at 08:05

## 2024-05-16 RX ADMIN — CEFTRIAXONE SODIUM 1 G: 1 INJECTION, POWDER, FOR SOLUTION INTRAMUSCULAR; INTRAVENOUS at 01:05

## 2024-05-16 NOTE — PROGRESS NOTES
Evaluated patient. Patient with advanced dementia.  Presently without complaints.    Labs reviewed hemoglobin 8.6 K 4.5   She is noted have facial bruising.  Check CT of the head-  reviewed negative for acute process.  Moderate cerebral atrophy with chronic small-vessel ischemic changes.      Reviewed electronic oral health record and advanced care documents.  Patient has living will and states she wishes to be a do not resuscitate she does not wish for ACLS or intubation.        Attempted to contact patient's nephew in [place] e-mail sent with update.    Addendum: NH called states patient with seizure like activity. Will add neurology consulted and EEG

## 2024-05-16 NOTE — ASSESSMENT & PLAN NOTE
Syncope  Previous admit patient experienced symptomatic anemia, had Hgb 5.5   Today is 8.4, possibly related? Trend CBC    Trend troponin  #1 10.5 HS  #2 11.1 HS         UA positive for evidence of infection-on abx  TSH-1.605  Carotid US-no significant stenosis  Echo-4/27/24   Left Ventricle: The left ventricle is normal in size. Normal wall thickness. There is concentric remodeling. Mild global hypokinesis present. There is low normal systolic function with a visually estimated ejection fraction of 50 - 52%. There is normal diastolic function.    Right Ventricle: Normal right ventricular cavity size. Wall thickness is normal. Systolic function is normal.    Aortic Valve: The aortic valve is a trileaflet valve. There is severe aortic valve sclerosis. Moderately restricted motion. There is moderate to severe stenosis. Aortic valve area by VTI is 1.22 cm². Aortic valve peak velocity is 2.97 m/s. Mean gradient is 20 mmHg. The dimensionless index is 0.54.    Tricuspid Valve: There is mild regurgitation with a centrally directed jet.    Pulmonary Artery: There is mild pulmonary hypertension. The estimated pulmonary artery systolic pressure is 37 mmHg.    IVC/SVC: Normal venous pressure at 3 mmHg.    Pericardium: There is a small posterior effusion. No indication of cardiac tamponade.    Orthostatic VS  Fall precautions  EKG PRN  Tele monitoring  PT/OT  Currently no focal deficits, CTH for acute change

## 2024-05-16 NOTE — PT/OT/SLP EVAL
Physical Therapy Evaluation    Patient Name:  Keila Romano   MRN:  [id number]    Recommendations:     Discharge Recommendations: Low Intensity Therapy (with 24/7 assistance from memory care staff)   Discharge Equipment Recommendations: none   Barriers to discharge: None    Assessment:     Keila Romano is a 89 y.o. female admitted with a medical diagnosis of Syncope.  She presents with the following impairments/functional limitations: weakness, impaired endurance, impaired self care skills, impaired functional mobility, gait instability, impaired balance, impaired cognition, decreased upper extremity function, decreased lower extremity function, decreased safety awareness, impaired cardiopulmonary response to activity. Patient is agreeable to participation with PT evaluation. Friend present at bedside. She resides at [facility] and uses a RW for ambulation at baseline or a WC if feeling weak. She reports a history of dizziness when she gets up so PT assessed orthostatics which were negative. Supine 117/56 (MAP 80), sitting 106/57 (MAP 77), standing 122/78 (MAP 92). Patient mildly symptomatic with transfers. She requires ModA for supine <> sit <> stand with RW and posterior lean. She ambulated 5' with RW and Isac before reporting RLE pain which her friend states bothers her sometimes. She returned to bed with bed alarm on, friend present, and RN notified.     Rehab Prognosis: Good; patient would benefit from acute skilled PT services to address these deficits and reach maximum level of function.    Recent Surgery: * No surgery found *      Plan:     During this hospitalization, patient to be seen 5 x/week to address the identified rehab impairments via gait training, therapeutic activities, therapeutic exercises and progress toward the following goals:    Plan of Care Expires:  06/16/24    Subjective   She is originally from [place]   Chief Complaint: gets dizzy when she gets up   Patient/Family  Comments/goals: lie back down   Pain/Comfort:  Pain Rating 1: 0/10    Patients cultural, spiritual, Restorationist conflicts given the current situation:      Living Environment:  Patient resides at [facility]   Prior to admission, patients level of function was RW for ambulation at baseline or a WC if feeling weak. She endorses a fall which friend states was a few weeks ago.  Equipment used at home: wheelchair, walker, rolling.  DME owned (not currently used): none.  Upon discharge, patient will have assistance from facility.    Objective:     Communicated with DARRICK Esqueda prior to session.  Patient found HOB elevated with bed alarm, myers catheter, telemetry  upon PT entry to room.    General Precautions: Standard, fall  Orthopedic Precautions:N/A   Braces: N/A  Respiratory Status: Room air    Exams:  RLE Strength: WFL  LLE Strength: WFL    Functional Mobility:  Bed Mobility:     Supine to Sit: moderate assistance  Transfers:     Sit to Stand:  moderate assistance with rolling walker  Gait: 5' forward/backward with RW and Isac      AM-PAC 6 CLICK MOBILITY  Total Score:11       Treatment & Education:  Patient was educated on the importance of OOB activity and functional mobility to negate negative effects of prolonged bed rest during hospitalization, safe transfers and ambulation, and D/C planning     Patient left HOB elevated with all lines intact, call button in reach, bed alarm on, RN notified, and friend present.    GOALS:   Multidisciplinary Problems       Physical Therapy Goals          Problem: Physical Therapy    Goal Priority Disciplines Outcome Goal Variances Interventions   Physical Therapy Goal     PT, PT/OT      Description: Goals to be met by: 24     Patient will increase functional independence with mobility by performin. Supine to sit with Stand-by Assistance  2. Sit to stand transfer with Stand-by Assistance  3. Bed to chair transfer with Stand-by Assistance using Rolling  Walker  4. Gait  x 50 feet with Contact Guard Assistance using Rolling Walker.                          History:     Past Medical History:   Diagnosis Date    Dementia     Hip fracture     L    Iron deficiency anemia, unspecified        Past Surgical History:   Procedure Laterality Date    PERCUTANEOUS PINNING OF HIP Right 11/22/2021    Procedure: PINNING, HIP, PERCUTANEOUS;  Surgeon: Navneet Rios MD;  Location: [facility];  Service: Orthopedics;  Laterality: Right;       Time Tracking:     PT Received On: 05/16/24  PT Start Time: 1121     PT Stop Time: 1144  PT Total Time (min): 23 min     Billable Minutes: Evaluation 10 and Gait Training 13      05/16/2024

## 2024-05-16 NOTE — PLAN OF CARE
Problem: Infection  Goal: Absence of Infection Signs and Symptoms  Outcome: Progressing     Problem: Adult Inpatient Plan of Care  Goal: Plan of Care Review  Outcome: Progressing  Goal: Readiness for Transition of Care  Outcome: Progressing     Problem: UTI (Urinary Tract Infection)  Goal: Improved Infection Symptoms  Outcome: Progressing     Problem: Syncope  Goal: Absence of Syncopal Symptoms  Outcome: Progressing     Problem: Confusion Chronic  Goal: Optimal Cognitive Function  Outcome: Not Progressing   Pt arrived on floor to room 204. Telesitter placed in room due to confusion and pulling at lines. Pt is combative towards staff. Dark tarry stool found present in brief. Incontinence care provided. Female external cath in place. Feliberto hoses AND non-skid socks applied.

## 2024-05-16 NOTE — H&P
[facility]  History & Physical    Patient Name: Keila Romano  MRN: [id number]  Patient Class: OP- Observation  Admission Date: 5/15/2024  Attending Physician: Cece Ch MD   Primary Care Provider: Dallin Pleitez MD         Patient information was obtained from past medical records and ER records.     Subjective:     Principal Problem:Syncope    Chief Complaint:   Chief Complaint   Patient presents with    Chest Pain     Biba from [facility], c/o chest pain starting today, [facility]asa adm in route        HPI:   Keila Romano is a 89 year old female with a past medical history of dementia and anemia, who presented to the ED from [facility] unit for a syncopal episode. HPI limited due to history of dementia. Per ED notes, the patient was sitting, passed out and woke with complaints of chest pain, mumbling and dilated pupils, then slowly returned to baseline. She was noted to be hypotensive with a systolic blood pressure in the 90's, but otherwise normal vital signs. She is oriented to person only, which is her baseline. She was admitted about one month ago for similar complaints, was found to be anemic with a hemoglobin of 5.5 and transfused at that time. She had a full work up done including carotid ultrasound, which showed no significant stenosis and an echo which showed an EF of 50-52% and moderate to severe aortic valve stenosis. She was to follow up with cardiology and GI on discharge but does not appear to have done so.     ED work up included a CBC, which showed baseline anemia. CMP  was unremarkable. HS Troponin x 2 performed, and resulted at 10.5 and 11.1 respectively. BNP 74. Urinalysis positive for evidence of infection.  CXR showed no acute cardiopulmonary abnormalities. EKG showed LBBB, which was seen on previous EKG's. Hospital Medicine consulted for admission and further management.    Past Medical History:   Diagnosis Date     Dementia     Hip fracture     L    Iron deficiency anemia, unspecified        Past Surgical History:   Procedure Laterality Date    PERCUTANEOUS PINNING OF HIP Right 11/22/2021    Procedure: PINNING, HIP, PERCUTANEOUS;  Surgeon: Navneet Rios MD;  Location: [facility];  Service: Orthopedics;  Laterality: Right;       Review of patient's allergies indicates:  No Known Allergies    No current facility-administered medications on file prior to encounter.     Current Outpatient Medications on File Prior to Encounter   Medication Sig    aspirin (ECOTRIN) 81 MG EC tablet Take 1 tablet (81 mg total) by mouth once daily. (Patient taking differently: Take 81 mg by mouth once daily. 08:00)    cephALEXin (KEFLEX) 500 MG capsule Take 500 mg by mouth every 8 (eight) hours.    donepeziL (ARICEPT) 5 MG tablet Take 1 tablet (5 mg total) by mouth once daily. (Patient taking differently: Take 5 mg by mouth once daily. 08:00)    EScitalopram oxalate (LEXAPRO) 10 MG tablet Take 1 tablet (10 mg total) by mouth every evening. (Patient taking differently: Take 10 mg by mouth every evening. 18:00)    ferrous sulfate (FEOSOL) 325 mg (65 mg iron) Tab tablet Take 1 tablet (325 mg total) by mouth once daily.    PAIN RELIEF, ACETAMINOPHEN, 500 mg tablet Take 1,000 mg by mouth every evening. 19:00    QUEtiapine (SEROQUEL) 100 MG Tab Take 1 tablet (100 mg total) by mouth every evening. (Patient taking differently: Take 100 mg by mouth every evening. 19:00)     Family History    None       Tobacco Use    Smoking status: Never    Smokeless tobacco: Never   Substance and Sexual Activity    Alcohol use: No    Drug use: Never    Sexual activity: Not on file     Review of Systems   Unable to perform ROS: Dementia   Neurological:  Positive for syncope.     Objective:     Vital Signs (Most Recent):  Temp: 98 °F (36.7 °C) (05/16/24 0020)  Pulse: 77 (05/16/24 0020)  Resp: 18 (05/16/24 0020)  BP: (!) 168/75 (05/16/24 0020)  SpO2: 98 % (05/16/24 0020) Vital  Signs (24h Range):  Temp:  [97.8 °F (36.6 °C)-98 °F (36.7 °C)] 98 °F (36.7 °C)  Pulse:  [59-80] 77  Resp:  [16-25] 18  SpO2:  [95 %-98 %] 98 %  BP: ()/(51-75) 168/75     Weight: 42.6 kg (94 lb)  Body mass index is 17.19 kg/m².     Physical Exam  Vitals and nursing note reviewed.   Constitutional:       General: She is sleeping. She is not in acute distress.     Appearance: Normal appearance. She is well-developed. She is not ill-appearing.   HENT:      Head: Normocephalic. Contusion present.        Mouth/Throat:      Lips: Pink.      Mouth: Mucous membranes are moist.   Eyes:      Conjunctiva/sclera: Conjunctivae normal.      Pupils: Pupils are equal, round, and reactive to light.   Neck:      Thyroid: No thyromegaly.      Vascular: No JVD.   Cardiovascular:      Rate and Rhythm: Normal rate and regular rhythm.      Heart sounds: S1 normal and S2 normal. Murmur heard.      Systolic murmur is present with a grade of 3/6.      No friction rub. No gallop.   Pulmonary:      Effort: Pulmonary effort is normal.      Breath sounds: Normal breath sounds.   Abdominal:      General: Bowel sounds are normal. There is no distension.      Palpations: Abdomen is soft. There is no mass.      Tenderness: There is no abdominal tenderness.   Musculoskeletal:         General: Normal range of motion.      Cervical back: Full passive range of motion without pain, normal range of motion and neck supple.   Skin:     General: Skin is warm and dry.      Capillary Refill: Capillary refill takes less than 2 seconds.   Neurological:      General: No focal deficit present.      Mental Status: She is easily aroused. She is confused.      GCS: GCS eye subscore is 4. GCS verbal subscore is 4. GCS motor subscore is 6.      Cranial Nerves: No cranial nerve deficit.      Sensory: Sensation is intact.      Motor: Motor function is intact.      Comments: Oriented to self only   Psychiatric:         Behavior: Behavior normal. Behavior is  "cooperative.              CRANIAL NERVES     CN III, IV, VI   Pupils are equal, round, and reactive to light.       Significant Labs: All pertinent labs within the past 24 hours have been reviewed.  CBC:   Recent Labs   Lab 05/15/24  2019   WBC 5.55   HGB 8.4*   HCT 28.9*        CMP:   Recent Labs   Lab 05/15/24  2019      K 3.8      CO2 25   *   BUN 27   CREATININE 0.7   CALCIUM 8.5*   PROT 6.1   ALBUMIN 3.6   BILITOT 0.4   ALKPHOS 67   AST 18   ALT 9*   ANIONGAP 8     Cardiac Markers:   Recent Labs   Lab 05/15/24  2019   BNP 74     Magnesium:   Recent Labs   Lab 05/15/24  2019   MG 1.9     POCT Glucose: No results for input(s): "POCTGLUCOSE" in the last 48 hours.  Troponin:   Recent Labs   Lab 05/15/24  2019 05/15/24  2201   TROPONINIHS 10.5 11.1     TSH:   Recent Labs   Lab 04/27/24  0821   TSH 1.605     Urine Studies:   Recent Labs   Lab 05/15/24  2056   COLORU Yellow   APPEARANCEUA Hazy*   PHUR 6.0   SPECGRAV 1.020   PROTEINUA Trace*   GLUCUA Negative   KETONESU Trace*   BILIRUBINUA Negative   OCCULTUA Negative   NITRITE Negative   UROBILINOGEN 2.0-3.0*   LEUKOCYTESUR 2+*   WBCUA 9*   BACTERIA Moderate*       Significant Imaging: I have reviewed all pertinent imaging results/findings within the past 24 hours.  Imaging Results              X-Ray Chest AP Portable (Final result)  Result time 05/15/24 20:52:40      Final result by Paras Alegria MD (05/15/24 20:52:40)                   Impression:      No convincing radiographic evidence of acute intrathoracic process on this single view.      Electronically signed by: Paras Alegria MD  Date:    05/15/2024  Time:    20:52               Narrative:    EXAMINATION:  XR CHEST AP PORTABLE    CLINICAL HISTORY:  Chest Pain;    TECHNIQUE:  Single frontal view of the chest was performed.    COMPARISON:  04/26/2024    FINDINGS:  Cardiac monitoring leads overlie the chest.  The cardiac silhouette is mildly enlarged, similar to prior " examination.  There is atherosclerotic calcification of the thoracic aorta.  Lungs demonstrate mild coarse interstitial attenuation, similar to prior examination.  No new large confluent airspace consolidation appreciated.  No significant volume of pleural fluid or pneumothorax identified.  Osseous structures demonstrate degenerative changes.                                      Assessment/Plan:     * Syncope    Syncope  Previous admit patient experienced symptomatic anemia, had Hgb 5.5   Today is 8.4, possibly related? Trend CBC    Trend troponin  #1 10.5 HS  #2 11.1 HS         UA positive for evidence of infection-on abx  TSH-1.605  Carotid US-no significant stenosis  Echo-4/27/24   Left Ventricle: The left ventricle is normal in size. Normal wall thickness. There is concentric remodeling. Mild global hypokinesis present. There is low normal systolic function with a visually estimated ejection fraction of 50 - 52%. There is normal diastolic function.    Right Ventricle: Normal right ventricular cavity size. Wall thickness is normal. Systolic function is normal.    Aortic Valve: The aortic valve is a trileaflet valve. There is severe aortic valve sclerosis. Moderately restricted motion. There is moderate to severe stenosis. Aortic valve area by VTI is 1.22 cm². Aortic valve peak velocity is 2.97 m/s. Mean gradient is 20 mmHg. The dimensionless index is 0.54.    Tricuspid Valve: There is mild regurgitation with a centrally directed jet.    Pulmonary Artery: There is mild pulmonary hypertension. The estimated pulmonary artery systolic pressure is 37 mmHg.    IVC/SVC: Normal venous pressure at 3 mmHg.    Pericardium: There is a small posterior effusion. No indication of cardiac tamponade.    Orthostatic VS  Fall precautions  EKG PRN  Tele monitoring  PT/OT  Currently no focal deficits, CTH for acute change    UTI (urinary tract infection)  Follow urine culture  Rocephin IV      Murmur  On tele  Hx LBBB on previous  EKG      Iron deficiency anemia  Patient's anemia is currently controlled. Has not received any PRBCs to date. Etiology likely d/t Iron deficiency  Current CBC reviewed-   Lab Results   Component Value Date    HGB 8.4 (L) 05/15/2024    HCT 28.9 (L) 05/15/2024     Monitor serial CBC and transfuse if patient becomes hemodynamically unstable, symptomatic or H/H drops below 7/21.    Dementia with behavioral disturbance  Noted, chronic  Continue home meds  Delirium and fall precautions      VTE Risk Mitigation (From admission, onward)           Ordered     enoxaparin injection 40 mg  Daily         05/16/24 0004     IP VTE HIGH RISK PATIENT  Once         05/16/24 0004     Place sequential compression device  Until discontinued         05/16/24 0004                                MADISON Bonds  Department of Hospital Medicine  [facility]/Surg

## 2024-05-16 NOTE — NURSING
Nurses Note -- 4 Eyes      5/16/2024   4:38 AM      Skin assessed during: Admit      [] No Altered Skin Integrity Present    []Prevention Measures Documented      [x] Yes- Altered Skin Integrity Present or Discovered   [x] LDA Added if Not in Epic (Describe Wound)   [x] New Altered Skin Integrity was Present on Admit and Documented in LDA   [x] Wound Image Taken    Wound Care Consulted? No    Attending Nurse:  Paula Espinoza RN/Staff Member:   Tricia HOLLINGSWORTH

## 2024-05-16 NOTE — PLAN OF CARE
Problem: Adult Inpatient Plan of Care  Goal: Plan of Care Review  Outcome: Progressing  Goal: Patient-Specific Goal (Individualized)  Outcome: Progressing  Goal: Absence of Hospital-Acquired Illness or Injury  Outcome: Progressing  Goal: Optimal Comfort and Wellbeing  Outcome: Progressing  Goal: Readiness for Transition of Care  Outcome: Progressing     Problem: Syncope  Goal: Absence of Syncopal Symptoms  Outcome: Progressing

## 2024-05-16 NOTE — PROCEDURES
Routine EEG Report    Keila Romano  [id number]  11/21/1931    DATE OF SERVICE:  05/16/2024  REASON FOR CONSULT:  89-year-old woman with an episode of loss of consciousness with collapse.  Evaluate for evidence of epileptiform activity.    METHODOLOGY   Electroencephalographic (EEG) recording is with electrodes placed according to the International 10-20 placement system.  Thirty two (32) channels of digital signal (sampling rate of 512/sec) including T1 and T2 was simultaneously recorded from the scalp and may include  EKG, EMG, and/or eye monitors.  Recording band pass was 0.1 to 512 hz.  Digital video recording of the patient is simultaneously recorded with the EEG.  The patient is instructed report clinical symptoms which may occur during the recording session.  EEG and video recording is stored and archived in digital format. Activation procedures which include photic stimulation, hyperventilation and instructing patients to perform simple task are done in selected patients.    The EEG is displayed on a monitor screen and can be reviewed using different montages.  Computer assisted analysis is employed to detect spike and electrographic seizure activity.   The entire record is submitted for computer analysis.  The entire recording is visually reviewed and the times identified by computer analysis as being spikes or seizures are reviewed again.  Compresses spectral analysis (CSA) is also performed on the activity recorded from each individual channel.  This is displayed as a power display of frequencies from 0 to 30 Hz over time.   The CSA is reviewed looking for asymmetries in power between homologous areas of the scalp and then compared with the original EEG recording.     Skweez software is also utilized in the review of this study.  This software suite analyzes the EEG recording in multiple domains.  Coherence and rhythmicity is computed to identify EEG sections which may contain organized seizures.  Each  channel undergoes analysis to detect presence of spike and sharp waves which have special and morphological characteristic of epileptic activity.  The routine EEG recording is converted from spacial into frequency domain.  This is then displayed comparing homologous areas to identify areas of significant asymmetry.  Algorithm to identify non-cortically generated artifact is used to separate eye movement, EMG and other artifact from the EEG.      EEG FINDINGS  Background activity:   The waking background is continuous, disorganized, relatively symmetric, predominantly delta with plenty of overriding theta and multifocal slowing in all quadrants.    Sleep:  Sleep is not captured during this recording session.    Activation procedures:   Hyperventilation is not performed  Photic stimulation is not performed    Cardiac Monitor:   Heart rate appears generally regular on a single lead EKG.    Impression:   This is an abnormal awake routine EEG because of a slow and disorganized background consistent with diffuse cortical dysfunction and a moderate encephalopathy.  This finding is nonspecific with regards to etiology but can be seen in the setting of toxic/metabolic derangements, infection, and as a medication effect.  There are no prominent focal findings however encephalopathy obscures focal findings.  There are no epileptiform discharges and no electrographic seizures.    Margarita Ngo MD PhD FACNS  Neurology-Epilepsy

## 2024-05-16 NOTE — ED PROVIDER NOTES
Encounter Date: 5/15/2024       History     Chief Complaint   Patient presents with    Chest Pain     Biba from [facility], c/o chest pain starting today, [facility] adm in route     89-year-old female brought in from [facility] for syncopal episode.  She was sitting, passed out, awoke complaining of some chest pain dilated pupils, mumbling, and slowly came to.  EMS arrived found her mildly hypotensive with blood pressure systolic in the 90s, other vitals are normal.  She was communicating clearly.  She does have history of dementia and history is somewhat limited at baseline.  On my exam, she is answering questions normally as compared to charted history of dementia.      Review of patient's allergies indicates:  No Known Allergies  Past Medical History:   Diagnosis Date    Dementia     Hip fracture     L    Iron deficiency anemia, unspecified      Past Surgical History:   Procedure Laterality Date    PERCUTANEOUS PINNING OF HIP Right 11/22/2021    Procedure: PINNING, HIP, PERCUTANEOUS;  Surgeon: Navneet Rios MD;  Location: [facility];  Service: Orthopedics;  Laterality: Right;     Family History   Problem Relation Name Age of Onset    Heart disease Neg Hx       Social History     Tobacco Use    Smoking status: Never    Smokeless tobacco: Never   Substance Use Topics    Alcohol use: No    Drug use: Never     Review of Systems   Constitutional:  Positive for diaphoresis.   Cardiovascular:  Positive for chest pain.   Neurological:  Positive for syncope.       Physical Exam     Initial Vitals [05/15/24 1948]   BP Pulse Resp Temp SpO2   103/60 77 16 97.8 °F (36.6 °C) 95 %      MAP       --         Physical Exam    Nursing note and vitals reviewed.  Constitutional: She appears well-developed and well-nourished. No distress.   HENT:   Head: Normocephalic.   Mouth/Throat: Oropharynx is clear and moist.   Healing bruises on the left side of the face   Eyes: EOM are normal. Pupils are equal, round, and  reactive to light. Right eye exhibits no discharge. Left eye exhibits no discharge.   Neck: No tracheal deviation present.   Normal range of motion.  Cardiovascular:  Normal rate, regular rhythm and intact distal pulses.           Pulmonary/Chest: No respiratory distress. She has no wheezes. She exhibits no tenderness.   Abdominal: Abdomen is soft. She exhibits no distension. There is no abdominal tenderness.   Musculoskeletal:         General: No tenderness or edema. Normal range of motion.      Cervical back: Normal range of motion.     Neurological: She is alert and oriented to person, place, and time. She has normal strength. No cranial nerve deficit or sensory deficit. GCS eye subscore is 4. GCS verbal subscore is 5. GCS motor subscore is 6.   Skin: Skin is warm and dry. No rash noted.   Psychiatric: She has a normal mood and affect. Her behavior is normal. Thought content normal.         ED Course   Procedures  Labs Reviewed   CBC W/ AUTO DIFFERENTIAL - Abnormal; Notable for the following components:       Result Value    RBC 3.58 (*)     Hemoglobin 8.4 (*)     Hematocrit 28.9 (*)     MCV 81 (*)     MCH 23.5 (*)     MCHC 29.1 (*)     MPV 8.8 (*)     All other components within normal limits   COMPREHENSIVE METABOLIC PANEL - Abnormal; Notable for the following components:    Glucose 134 (*)     Calcium 8.5 (*)     ALT 9 (*)     All other components within normal limits   URINALYSIS, REFLEX TO URINE CULTURE - Abnormal; Notable for the following components:    Appearance, UA Hazy (*)     Protein, UA Trace (*)     Ketones, UA Trace (*)     Urobilinogen, UA 2.0-3.0 (*)     Leukocytes, UA 2+ (*)     All other components within normal limits    Narrative:     Specimen Source->Urine   URINALYSIS MICROSCOPIC - Abnormal; Notable for the following components:    WBC, UA 9 (*)     Bacteria Moderate (*)     All other components within normal limits    Narrative:     Specimen Source->Urine   MAGNESIUM   TROPONIN I HIGH  SENSITIVITY   TROPONIN I HIGH SENSITIVITY   B-TYPE NATRIURETIC PEPTIDE     EKG Readings: (Independently Interpreted)   EKG with regular rate, regular rhythm, normal axis, no acute ST elevations or depressions, normal NJ, QRS and QT interval. Interpreted by me.         Imaging Results              X-Ray Chest AP Portable (Final result)  Result time 05/15/24 20:52:40      Final result by Paras Alegria MD (05/15/24 20:52:40)                   Impression:      No convincing radiographic evidence of acute intrathoracic process on this single view.      Electronically signed by: Paras Alegria MD  Date:    05/15/2024  Time:    20:52               Narrative:    EXAMINATION:  XR CHEST AP PORTABLE    CLINICAL HISTORY:  Chest Pain;    TECHNIQUE:  Single frontal view of the chest was performed.    COMPARISON:  04/26/2024    FINDINGS:  Cardiac monitoring leads overlie the chest.  The cardiac silhouette is mildly enlarged, similar to prior examination.  There is atherosclerotic calcification of the thoracic aorta.  Lungs demonstrate mild coarse interstitial attenuation, similar to prior examination.  No new large confluent airspace consolidation appreciated.  No significant volume of pleural fluid or pneumothorax identified.  Osseous structures demonstrate degenerative changes.                                       Medications   pantoprazole EC tablet 40 mg (has no administration in time range)   aspirin EC tablet 81 mg (has no administration in time range)   donepeziL tablet 5 mg (has no administration in time range)   EScitalopram oxalate tablet 10 mg (10 mg Oral Given 5/16/24 0138)   QUEtiapine tablet 100 mg (100 mg Oral Given 5/16/24 0138)   sodium chloride 0.9% flush 3 mL (has no administration in time range)   enoxaparin injection 40 mg (has no administration in time range)   melatonin tablet 9 mg (has no administration in time range)   ondansetron injection 8 mg (has no administration in time range)   polyethylene  glycol packet 17 g (has no administration in time range)   acetaminophen tablet 650 mg (has no administration in time range)   simethicone chewable tablet 80 mg (has no administration in time range)   aluminum & magnesium hydroxide-simethicone 400-400-40 mg/5 mL suspension 15 mL (has no administration in time range)   acetaminophen tablet 650 mg (has no administration in time range)   naloxone 0.4 mg/mL injection 0.02 mg (has no administration in time range)   potassium bicarbonate disintegrating tablet 50 mEq (has no administration in time range)   potassium bicarbonate disintegrating tablet 35 mEq (has no administration in time range)   potassium bicarbonate disintegrating tablet 60 mEq (has no administration in time range)   magnesium oxide tablet 800 mg (has no administration in time range)   magnesium oxide tablet 800 mg (has no administration in time range)   potassium, sodium phosphates 280-160-250 mg packet 2 packet (has no administration in time range)   potassium, sodium phosphates 280-160-250 mg packet 2 packet (has no administration in time range)   potassium, sodium phosphates 280-160-250 mg packet 2 packet (has no administration in time range)   glucose chewable tablet 16 g (has no administration in time range)   glucose chewable tablet 24 g (has no administration in time range)   glucagon (human recombinant) injection 1 mg (has no administration in time range)   dextrose 10% bolus 125 mL 125 mL (has no administration in time range)   dextrose 10% bolus 250 mL 250 mL (has no administration in time range)   cefTRIAXone (Rocephin) 1 g in dextrose 5 % in water (D5W) 100 mL IVPB (MB+) (has no administration in time range)   sodium chloride 0.9% bolus 1,000 mL 1,000 mL (0 mLs Intravenous Stopped 5/15/24 [id number])     Medical Decision Making  89-year-old female with dementia brought in from memory care for syncopal episode.  Vitals here notable for blood pressure, systolic of , other vitals within normal  limits.  On exam, patient is answering questions appropriately but has some signs dementia.  Abdomen is soft and nontender.  She is incontinent of urine here, raising concern for urinary tract infection.  EKG without acute ischemic changes, without evidence of arrhythmia.  Troponin within normal limits, doubt acute coronary syndrome.  Chest x-ray without acute cardiopulmonary abnormality.  BNP within limits.  CBC and CMP without acute abnormality.  UA is consistent with urinary tract infection, will treat with Rocephin.  Given IV fluids with improvement in blood pressure. Will admit to [facility] for syncope, treatment of UTI.     Amount and/or Complexity of Data Reviewed  Labs: ordered.  Radiology: ordered.                                      Clinical Impression:  Final diagnoses:  [R07.9] Chest pain  [R55] Syncope, unspecified syncope type (Primary)          ED Disposition Condition    Observation                 Micah Hoffmann MD  05/16/24 0144

## 2024-05-16 NOTE — NURSING
Pt was straight cath in ER around 930pm. Pt is due to void. Bladder scan volume 284 cc. No urge to void. Diaper dry. Elsi Khanna NP notified. Mccord placement ordered.

## 2024-05-16 NOTE — NURSING
0130 attempted to call relative, Gurinder Pérez, cellular number and house phone number to update on pt status. No answer.

## 2024-05-16 NOTE — PLAN OF CARE
[facility] - Med/Surg  Initial Discharge Assessment       Primary Care Provider: Dallin Pleitez MD    Admission Diagnosis: Syncope, unspecified syncope type [R55]    Admission Date: 5/15/2024  Expected Discharge Date: 5/17/2024    Transition of Care Barriers: None    Payor: HU[employer] MANAGED MEDICARE / Plan: [employer] MEDICARE HMO / Product Type: Capitation /     Extended Emergency Contact Information  Primary Emergency Contact: Gurinder Pérez  Address: [address] [place] [address]  Home Phone: [phone]  Mobile Phone: [phone]  Relation: Relative   needed? No  Secondary Emergency Contact: JAYLEN BAE  Mobile Phone: [phone]  Relation: Neighbor  Preferred language: English   needed? No    Discharge Plan A: Return to nursing home  Discharge Plan B: Return to Nursing Home      [id number] - [place], [place] - [address] [facility] [address]  [address]  [place] [address]  Phone: [phone] Fax: [phone]    [facility] - [place], [place] - [address]  [address]  [place] [address]  Phone: [phone] Fax: [phone]    DC assessment completed Anamaria from [facility] [phone]. Verified information on facesheet as correct. Pt lives at [facility]. Reports she has help if needed from facility. States established with [facility].  Reports facility Will provide transportation home upon DC. Reports taking home medications as prescribed and can currently afford them. Verified insurance on file. Denies recent inpt stay in last 30 days.  DC plan is return to .    Initial Assessment (most recent)       Adult Discharge Assessment - 05/16/24 1049          Discharge Assessment    Assessment Type Discharge Planning Assessment     Confirmed/corrected address, phone number and insurance Yes     Confirmed Demographics Correct on Facesheet     Source of  Information facility verbal report     People in Home facility resident     Facility Arrived From: [facility]     Do you expect to return to your current living situation? Yes     Do you have help at home or someone to help you manage your care at home? Yes     Who are your caregiver(s) and their phone number(s)? facility     Prior to hospitilization cognitive status: Not Oriented to Place;Not Oriented to Time     Current cognitive status: Not Oriented to Place;Not Oriented to Time     Walking or Climbing Stairs Difficulty no     Dressing/Bathing Difficulty no     Readmission within 30 days? Yes     Patient currently being followed by outpatient case management? No     Do you currently have service(s) that help you manage your care at home? Yes     Name and Contact number of agency [facility]     Is the pt/caregiver preference to resume services with current agency Yes     Do you take prescription medications? Yes     Do you have prescription coverage? Yes     Do you have any problems affording any of your prescribed medications? No     Is the patient taking medications as prescribed? yes     Who is going to help you get home at discharge? Facility     How do you get to doctors appointments? agency     Are you on dialysis? No     Do you take coumadin? No     Discharge Plan A Return to nursing home     Discharge Plan B Return to Nursing Home     DME Needed Upon Discharge  none     Discharge Plan discussed with: Caregiver     Name(s) and Number(s) Anamaria at facility [phone]     Transition of Care Barriers None

## 2024-05-16 NOTE — ASSESSMENT & PLAN NOTE
Patient's anemia is currently controlled. Has not received any PRBCs to date. Etiology likely d/t Iron deficiency  Current CBC reviewed-   Lab Results   Component Value Date    HGB 8.4 (L) 05/15/2024    HCT 28.9 (L) 05/15/2024     Monitor serial CBC and transfuse if patient becomes hemodynamically unstable, symptomatic or H/H drops below 7/21.

## 2024-05-16 NOTE — SUBJECTIVE & OBJECTIVE
Past Medical History:   Diagnosis Date    Dementia     Hip fracture     L    Iron deficiency anemia, unspecified        Past Surgical History:   Procedure Laterality Date    PERCUTANEOUS PINNING OF HIP Right 11/22/2021    Procedure: PINNING, HIP, PERCUTANEOUS;  Surgeon: Navneet Rios MD;  Location: [facility];  Service: Orthopedics;  Laterality: Right;       Review of patient's allergies indicates:  No Known Allergies    No current facility-administered medications on file prior to encounter.     Current Outpatient Medications on File Prior to Encounter   Medication Sig    aspirin (ECOTRIN) 81 MG EC tablet Take 1 tablet (81 mg total) by mouth once daily. (Patient taking differently: Take 81 mg by mouth once daily. 08:00)    cephALEXin (KEFLEX) 500 MG capsule Take 500 mg by mouth every 8 (eight) hours.    donepeziL (ARICEPT) 5 MG tablet Take 1 tablet (5 mg total) by mouth once daily. (Patient taking differently: Take 5 mg by mouth once daily. 08:00)    EScitalopram oxalate (LEXAPRO) 10 MG tablet Take 1 tablet (10 mg total) by mouth every evening. (Patient taking differently: Take 10 mg by mouth every evening. 18:00)    ferrous sulfate (FEOSOL) 325 mg (65 mg iron) Tab tablet Take 1 tablet (325 mg total) by mouth once daily.    PAIN RELIEF, ACETAMINOPHEN, 500 mg tablet Take 1,000 mg by mouth every evening. 19:00    QUEtiapine (SEROQUEL) 100 MG Tab Take 1 tablet (100 mg total) by mouth every evening. (Patient taking differently: Take 100 mg by mouth every evening. 19:00)     Family History    None       Tobacco Use    Smoking status: Never    Smokeless tobacco: Never   Substance and Sexual Activity    Alcohol use: No    Drug use: Never    Sexual activity: Not on file     Review of Systems   Unable to perform ROS: Dementia   Neurological:  Positive for syncope.     Objective:     Vital Signs (Most Recent):  Temp: 98 °F (36.7 °C) (05/16/24 0020)  Pulse: 77 (05/16/24 0020)  Resp: 18 (05/16/24 0020)  BP: (!) 168/75 (05/16/24  0020)  SpO2: 98 % (05/16/24 0020) Vital Signs (24h Range):  Temp:  [97.8 °F (36.6 °C)-98 °F (36.7 °C)] 98 °F (36.7 °C)  Pulse:  [59-80] 77  Resp:  [16-25] 18  SpO2:  [95 %-98 %] 98 %  BP: ()/(51-75) 168/75     Weight: 42.6 kg (94 lb)  Body mass index is 17.19 kg/m².     Physical Exam  Vitals and nursing note reviewed.   Constitutional:       General: She is sleeping. She is not in acute distress.     Appearance: Normal appearance. She is well-developed. She is not ill-appearing.   HENT:      Head: Normocephalic. Contusion present.        Mouth/Throat:      Lips: Pink.      Mouth: Mucous membranes are moist.   Eyes:      Conjunctiva/sclera: Conjunctivae normal.      Pupils: Pupils are equal, round, and reactive to light.   Neck:      Thyroid: No thyromegaly.      Vascular: No JVD.   Cardiovascular:      Rate and Rhythm: Normal rate and regular rhythm.      Heart sounds: S1 normal and S2 normal. Murmur heard.      Systolic murmur is present with a grade of 3/6.      No friction rub. No gallop.   Pulmonary:      Effort: Pulmonary effort is normal.      Breath sounds: Normal breath sounds.   Abdominal:      General: Bowel sounds are normal. There is no distension.      Palpations: Abdomen is soft. There is no mass.      Tenderness: There is no abdominal tenderness.   Musculoskeletal:         General: Normal range of motion.      Cervical back: Full passive range of motion without pain, normal range of motion and neck supple.   Skin:     General: Skin is warm and dry.      Capillary Refill: Capillary refill takes less than 2 seconds.   Neurological:      General: No focal deficit present.      Mental Status: She is easily aroused. She is confused.      GCS: GCS eye subscore is 4. GCS verbal subscore is 4. GCS motor subscore is 6.      Cranial Nerves: No cranial nerve deficit.      Sensory: Sensation is intact.      Motor: Motor function is intact.      Comments: Oriented to self only   Psychiatric:          "Behavior: Behavior normal. Behavior is cooperative.              CRANIAL NERVES     CN III, IV, VI   Pupils are equal, round, and reactive to light.       Significant Labs: All pertinent labs within the past 24 hours have been reviewed.  CBC:   Recent Labs   Lab 05/15/24  2019   WBC 5.55   HGB 8.4*   HCT 28.9*        CMP:   Recent Labs   Lab 05/15/24  2019      K 3.8      CO2 25   *   BUN 27   CREATININE 0.7   CALCIUM 8.5*   PROT 6.1   ALBUMIN 3.6   BILITOT 0.4   ALKPHOS 67   AST 18   ALT 9*   ANIONGAP 8     Cardiac Markers:   Recent Labs   Lab 05/15/24  2019   BNP 74     Magnesium:   Recent Labs   Lab 05/15/24  2019   MG 1.9     POCT Glucose: No results for input(s): "POCTGLUCOSE" in the last 48 hours.  Troponin:   Recent Labs   Lab 05/15/24  2019 05/15/24  2201   TROPONINIHS 10.5 11.1     TSH:   Recent Labs   Lab 04/27/24  0821   TSH 1.605     Urine Studies:   Recent Labs   Lab 05/15/24  2056   COLORU Yellow   APPEARANCEUA Hazy*   PHUR 6.0   SPECGRAV 1.020   PROTEINUA Trace*   GLUCUA Negative   KETONESU Trace*   BILIRUBINUA Negative   OCCULTUA Negative   NITRITE Negative   UROBILINOGEN 2.0-3.0*   LEUKOCYTESUR 2+*   WBCUA 9*   BACTERIA Moderate*       Significant Imaging: I have reviewed all pertinent imaging results/findings within the past 24 hours.  Imaging Results              X-Ray Chest AP Portable (Final result)  Result time 05/15/24 20:52:40      Final result by Paras Alegria MD (05/15/24 20:52:40)                   Impression:      No convincing radiographic evidence of acute intrathoracic process on this single view.      Electronically signed by: Paras Alegria MD  Date:    05/15/2024  Time:    20:52               Narrative:    EXAMINATION:  XR CHEST AP PORTABLE    CLINICAL HISTORY:  Chest Pain;    TECHNIQUE:  Single frontal view of the chest was performed.    COMPARISON:  04/26/2024    FINDINGS:  Cardiac monitoring leads overlie the chest.  The cardiac silhouette is " mildly enlarged, similar to prior examination.  There is atherosclerotic calcification of the thoracic aorta.  Lungs demonstrate mild coarse interstitial attenuation, similar to prior examination.  No new large confluent airspace consolidation appreciated.  No significant volume of pleural fluid or pneumothorax identified.  Osseous structures demonstrate degenerative changes.

## 2024-05-16 NOTE — HPI
Keila Romano is a 89 year old female with a past medical history of dementia and anemia, who presented to the ED from [facility] for a syncopal episode. HPI limited due to history of dementia. Per ED notes, the patient was sitting, passed out and woke with complaints of chest pain, mumbling and dilated pupils, then slowly returned to baseline. She was noted to be hypotensive with a systolic blood pressure in the 90's, but otherwise normal vital signs. She is oriented to person only, which is her baseline. She was admitted about one month ago for similar complaints, was found to be anemic with a hemoglobin of 5.5 and transfused at that time. She had a full work up done including carotid ultrasound, which showed no significant stenosis and an echo which showed an EF of 50-52% and moderate to severe aortic valve stenosis. She was to follow up with cardiology and GI on discharge but does not appear to have done so.     ED work up included a CBC, which showed baseline anemia. CMP  was unremarkable. HS Troponin x 2 performed, and resulted at 10.5 and 11.1 respectively. BNP 74. Urinalysis positive for evidence of infection.  CXR showed no acute cardiopulmonary abnormalities. EKG showed LBBB, which was seen on previous EKG's. Hospital Medicine consulted for admission and further management.

## 2024-05-17 VITALS
BODY MASS INDEX: 20.77 KG/M2 | DIASTOLIC BLOOD PRESSURE: 56 MMHG | RESPIRATION RATE: 18 BRPM | HEIGHT: 62 IN | SYSTOLIC BLOOD PRESSURE: 117 MMHG | OXYGEN SATURATION: 96 % | TEMPERATURE: 98 F | WEIGHT: 112.88 LBS | HEART RATE: 64 BPM

## 2024-05-17 LAB
ALBUMIN SERPL BCP-MCNC: 2.9 G/DL (ref 3.5–5.2)
ALP SERPL-CCNC: 64 U/L (ref 55–135)
ALT SERPL W/O P-5'-P-CCNC: 11 U/L (ref 10–44)
ANION GAP SERPL CALC-SCNC: 10 MMOL/L (ref 8–16)
ANISOCYTOSIS BLD QL SMEAR: ABNORMAL
AST SERPL-CCNC: 16 U/L (ref 10–40)
BASOPHILS # BLD AUTO: 0.07 K/UL (ref 0–0.2)
BASOPHILS NFR BLD: 1.6 % (ref 0–1.9)
BILIRUB SERPL-MCNC: 0.2 MG/DL (ref 0.1–1)
BUN SERPL-MCNC: 18 MG/DL (ref 10–30)
CALCIUM SERPL-MCNC: 8.7 MG/DL (ref 8.7–10.5)
CHLORIDE SERPL-SCNC: 105 MMOL/L (ref 95–110)
CO2 SERPL-SCNC: 24 MMOL/L (ref 23–29)
CREAT SERPL-MCNC: 0.8 MG/DL (ref 0.5–1.4)
DIFFERENTIAL METHOD BLD: ABNORMAL
EOSINOPHIL # BLD AUTO: 0.2 K/UL (ref 0–0.5)
EOSINOPHIL NFR BLD: 3.8 % (ref 0–8)
ERYTHROCYTE [DISTWIDTH] IN BLOOD BY AUTOMATED COUNT: ABNORMAL % (ref 11.5–14.5)
EST. GFR  (NO RACE VARIABLE): >60 ML/MIN/1.73 M^2
GLUCOSE SERPL-MCNC: 88 MG/DL (ref 70–110)
HCT VFR BLD AUTO: 28.6 % (ref 37–48.5)
HGB BLD-MCNC: 8.4 G/DL (ref 12–16)
HYPOCHROMIA BLD QL SMEAR: ABNORMAL
IMM GRANULOCYTES # BLD AUTO: 0.01 K/UL (ref 0–0.04)
IMM GRANULOCYTES NFR BLD AUTO: 0.2 % (ref 0–0.5)
LYMPHOCYTES # BLD AUTO: 1.3 K/UL (ref 1–4.8)
LYMPHOCYTES NFR BLD: 29.3 % (ref 18–48)
MAGNESIUM SERPL-MCNC: 1.9 MG/DL (ref 1.6–2.6)
MCH RBC QN AUTO: 23.9 PG (ref 27–31)
MCHC RBC AUTO-ENTMCNC: 29.4 G/DL (ref 32–36)
MCV RBC AUTO: 81 FL (ref 82–98)
MONOCYTES # BLD AUTO: 0.4 K/UL (ref 0.3–1)
MONOCYTES NFR BLD: 8.4 % (ref 4–15)
NEUTROPHILS # BLD AUTO: 2.5 K/UL (ref 1.8–7.7)
NEUTROPHILS NFR BLD: 56.7 % (ref 38–73)
NRBC BLD-RTO: 0 /100 WBC
OVALOCYTES BLD QL SMEAR: ABNORMAL
PHOSPHATE SERPL-MCNC: 4 MG/DL (ref 2.7–4.5)
PLATELET # BLD AUTO: 328 K/UL (ref 150–450)
PLATELET BLD QL SMEAR: ABNORMAL
PMV BLD AUTO: 8.9 FL (ref 9.2–12.9)
POIKILOCYTOSIS BLD QL SMEAR: ABNORMAL
POTASSIUM SERPL-SCNC: 3.9 MMOL/L (ref 3.5–5.1)
PROT SERPL-MCNC: 5.7 G/DL (ref 6–8.4)
RBC # BLD AUTO: 3.52 M/UL (ref 4–5.4)
SODIUM SERPL-SCNC: 139 MMOL/L (ref 136–145)
WBC # BLD AUTO: 4.43 K/UL (ref 3.9–12.7)

## 2024-05-17 PROCEDURE — 25000003 PHARM REV CODE 250: Performed by: NURSE PRACTITIONER

## 2024-05-17 PROCEDURE — 83735 ASSAY OF MAGNESIUM: CPT | Performed by: NURSE PRACTITIONER

## 2024-05-17 PROCEDURE — 97110 THERAPEUTIC EXERCISES: CPT | Mod: CQ

## 2024-05-17 PROCEDURE — 95819 EEG AWAKE AND ASLEEP: CPT

## 2024-05-17 PROCEDURE — 36415 COLL VENOUS BLD VENIPUNCTURE: CPT | Performed by: NURSE PRACTITIONER

## 2024-05-17 PROCEDURE — 85025 COMPLETE CBC W/AUTO DIFF WBC: CPT | Performed by: NURSE PRACTITIONER

## 2024-05-17 PROCEDURE — G0378 HOSPITAL OBSERVATION PER HR: HCPCS

## 2024-05-17 PROCEDURE — 97116 GAIT TRAINING THERAPY: CPT | Mod: CQ

## 2024-05-17 PROCEDURE — 80053 COMPREHEN METABOLIC PANEL: CPT | Performed by: NURSE PRACTITIONER

## 2024-05-17 PROCEDURE — 96366 THER/PROPH/DIAG IV INF ADDON: CPT

## 2024-05-17 PROCEDURE — 94761 N-INVAS EAR/PLS OXIMETRY MLT: CPT

## 2024-05-17 PROCEDURE — 94760 N-INVAS EAR/PLS OXIMETRY 1: CPT

## 2024-05-17 PROCEDURE — 84100 ASSAY OF PHOSPHORUS: CPT | Performed by: NURSE PRACTITIONER

## 2024-05-17 PROCEDURE — 63600175 PHARM REV CODE 636 W HCPCS: Performed by: NURSE PRACTITIONER

## 2024-05-17 RX ADMIN — POLYETHYLENE GLYCOL 3350 17 G: 17 POWDER, FOR SOLUTION ORAL at 08:05

## 2024-05-17 RX ADMIN — ASPIRIN 81 MG: 81 TABLET, COATED ORAL at 08:05

## 2024-05-17 RX ADMIN — PANTOPRAZOLE SODIUM 40 MG: 40 TABLET, DELAYED RELEASE ORAL at 08:05

## 2024-05-17 RX ADMIN — DONEPEZIL HYDROCHLORIDE 5 MG: 5 TABLET, FILM COATED ORAL at 08:05

## 2024-05-17 RX ADMIN — CEFTRIAXONE SODIUM 1 G: 1 INJECTION, POWDER, FOR SOLUTION INTRAMUSCULAR; INTRAVENOUS at 02:05

## 2024-05-17 NOTE — NURSING
Gave report to Teodora to assume and continue care, patient alert oriented to self, fall risk and cardiac monitoring, myers to gravity, all needs met, resting between care, no problems or pain verbalized.

## 2024-05-17 NOTE — PLAN OF CARE
Pt clear for DC from case management standpoint. Discharging to [facility] via JOVANNA harris.       05/17/24 1542   Final Note   Assessment Type Final Discharge Note   Anticipated Discharge Disposition [facility]

## 2024-05-17 NOTE — PLAN OF CARE
Pt unable to sign due to oriented only to person and only family lives in [place].       05/17/24 2541   HERNANDEZ Message   Medicare Outpatient and Observation Notification regarding financial responsibility   (Pt oriented to person.Only family in [place]. form signed by myself and DARRICK Carter.)   Date HERNANDEZ was signed 05/17/24   Time HERNANDEZ was signed [id number]

## 2024-05-17 NOTE — PLAN OF CARE
Problem: Infection  Goal: Absence of Infection Signs and Symptoms  Outcome: Progressing     Problem: Adult Inpatient Plan of Care  Goal: Plan of Care Review  Outcome: Progressing  Goal: Patient-Specific Goal (Individualized)  Outcome: Progressing  Goal: Absence of Hospital-Acquired Illness or Injury  Outcome: Progressing  Goal: Optimal Comfort and Wellbeing  Outcome: Progressing  Goal: Readiness for Transition of Care  Outcome: Progressing     Problem: UTI (Urinary Tract Infection)  Goal: Improved Infection Symptoms  Outcome: Progressing     Problem: Syncope  Goal: Absence of Syncopal Symptoms  Outcome: Progressing     Problem: Confusion Chronic  Goal: Optimal Cognitive Function  Outcome: Progressing     Problem: Wound  Goal: Optimal Coping  Outcome: Progressing  Goal: Optimal Functional Ability  Outcome: Progressing  Goal: Absence of Infection Signs and Symptoms  Outcome: Progressing  Goal: Improved Oral Intake  Outcome: Progressing  Goal: Optimal Pain Control and Function  Outcome: Progressing  Goal: Skin Health and Integrity  Outcome: Progressing  Goal: Optimal Wound Healing  Outcome: Progressing

## 2024-05-17 NOTE — PT/OT/SLP PROGRESS
Physical Therapy Treatment    Patient Name:  Keila Romano   MRN:  [id number]    Recommendations:     Discharge Recommendations: Low Intensity Therapy (with 24/7 assistance from memory care staff)  Discharge Equipment Recommendations: none  Barriers to discharge: None    Assessment:     Keila Romano is a 89 y.o. female admitted with a medical diagnosis of Syncope.  She presents with the following impairments/functional limitations: weakness, impaired endurance, impaired self care skills, impaired functional mobility, gait instability, impaired cognition, decreased lower extremity function, decreased safety awareness . Awake, alert, supine in bed. Pleasant and cooperative.  Reports no pain or dizziness when asked.  Agreed to mobilize.  Sup > sit with Min A and verbal cues for technique.  Sit > stand with rw and Min A.  Ambulated 250' with rw and Min A. Slight limp noted , patient reports RK stiffness.  Returned to sit up in chair.  Performed LE exercises.     Rehab Prognosis: Good; patient would benefit from acute skilled PT services to address these deficits and reach maximum level of function.    Recent Surgery: * No surgery found *      Plan:     During this hospitalization, patient to be seen 5 x/week to address the identified rehab impairments via gait training, therapeutic activities, therapeutic exercises and progress toward the following goals:    Plan of Care Expires:  06/16/24    Subjective     Chief Complaint: RK stiffness  Patient/Family Comments/goals: none stated  Pain/Comfort:  Pain Rating 1: 0/10      Objective:     Communicated with nurse Esqueda prior to session.  Patient found supine with bed alarm, telemetry, myers catheter (tele sitterat bedside) upon PT entry to room.     General Precautions: Standard, fall  Orthopedic Precautions: N/A  Braces: N/A  Respiratory Status: Room air     Functional Mobility:  Bed Mobility:     Supine to Sit: minimum assistance  Transfers:     Sit to Stand:   minimum assistance with rolling walker  Gait: 250' with rw and Min A.      AM-PAC 6 CLICK MOBILITY          Treatment & Education:  Ambulated with rw and Min A for safety.   BLE exercises : TKE's, Hip abd/add/ flexion , AP's, SLR's, HS ( limited RK), QS . Able to complete following demonstration.    Patient left up in chair with all lines intact, call button in reach, chair alarm on, nurse Yin notified, and tele sitter present..    GOALS:   Multidisciplinary Problems       Physical Therapy Goals          Problem: Physical Therapy    Goal Priority Disciplines Outcome Goal Variances Interventions   Physical Therapy Goal     PT, PT/OT      Description: Goals to be met by: 24     Patient will increase functional independence with mobility by performin. Supine to sit with Stand-by Assistance  2. Sit to stand transfer with Stand-by Assistance  3. Bed to chair transfer with Stand-by Assistance using Rolling Walker  4. Gait  x 50 feet with Contact Guard Assistance using Rolling Walker.                          Time Tracking:     PT Received On: 24  PT Start Time: 1027     PT Stop Time: 1050  PT Total Time (min): 23 min     Billable Minutes: Gait Training 10min and Therapeutic Exercise 13min    Treatment Type: Treatment  PT/PTA: PTA     Number of PTA visits since last PT visit: 1     2024

## 2024-05-17 NOTE — PLAN OF CARE
Called and spoke with Cindy at [facility].  Informed of pt return today.  States pt needs to return by 1600.      Contacted [employer] transportation at [phone] and [phone] to attempt to schedule transport back to [facility].  No transportation benefits.    Order placed for ADT30, wc van to [facility].    Pt established with [facility].  Referral sent via [facility] to resume services.       05/17/24 1207   Post-Acute Status   Post-Acute Authorization Hospice;Placement   Post-Acute Placement Status Set-up Complete/Auth obtained   Hospice Status Referrals Sent

## 2024-05-17 NOTE — PLAN OF CARE
Problem: Physical Therapy  Goal: Physical Therapy Goal  Description: Goals to be met by: 24     Patient will increase functional independence with mobility by performin. Supine to sit with Stand-by Assistance  2. Sit to stand transfer with Stand-by Assistance  3. Bed to chair transfer with Stand-by Assistance using Rolling Walker  4. Gait  x 50 feet with Contact Guard Assistance using Rolling Walker.     Outcome: Progressing   Therapeutic activity : bed mobility , transfers, gait with rw and assistance for safety.

## 2024-05-17 NOTE — PLAN OF CARE
Called and spoke with Anamaria at  [facility].  Informed her of ETA of john harris.  States they will have a nurse at 1900 tonight and pt can still come today.  Daniel, charge nurse notified.

## 2024-05-17 NOTE — CONSULTS
[facility]  Department of Neurology  Neurology Consultation Note        PATIENT NAME: Keila Romano  MRN: [id number]  CSN: [id number]      TODAY'S DATE: 05/17/2024  ADMIT DATE: 5/15/2024                            CONSULTING PROVIDER: Santy Mcfadden MD  CONSULT REQUESTED BY: Cece Ch MD      Reason for consult: Syncope       History obtained from chart review.    HPI per EMR: Keila Romano is a 89 y.o. female with a history of dementia and anemia, who presented to the ED from [place] memory care unit for a syncopal episode. HPI limited due to history of dementia. Per ED notes, the patient was sitting, passed out and woke with complaints of chest pain, mumbling and dilated pupils, then slowly returned to baseline. She was noted to be hypotensive with a systolic blood pressure in the 90's, but otherwise normal vital signs. She is oriented to person only, which is her baseline. She was admitted about one month ago for similar complaints, was found to be anemic with a hemoglobin of 5.5 and transfused at that time. She had a full work up done including carotid ultrasound, which showed no significant stenosis and an echo which showed an EF of 50-52% and moderate to severe aortic valve stenosis. She was to follow up with cardiology and GI on discharge but does not appear to have done so.      ED work up included a CBC, which showed baseline anemia. CMP  was unremarkable. HS Troponin x 2 performed, and resulted at 10.5 and 11.1 respectively. BNP 74. Urinalysis positive for evidence of infection.  CXR showed no acute cardiopulmonary abnormalities. EKG showed LBBB, which was seen on previous EKG's. Hospital Medicine consulted for admission and further management.       Neurology consult:  Patient was seen and examined by me.  History is only obtained from chart review as patient pleasantly confused.  She is not oriented to place or time.  And not able to provide any history.  Per chart review,  she presents from [facility] and memory care unit after she had an episode of syncopal spell at the facility.  On arrival, her systolic blood pressure was in 90s and was pleasantly confused with nonfocal exam.  As per her memory care unit, they have noticed some shaking during her syncopal spell however further semiology is unknown.    Currently on exam, she is oriented to self only.  Nonfocal on exam.    PREVIOUS MEDICAL HISTORY:  Past Medical History:   Diagnosis Date    Dementia     Hip fracture     L    Iron deficiency anemia, unspecified      PREVIOUS SURGICAL HISTORY:  Past Surgical History:   Procedure Laterality Date    PERCUTANEOUS PINNING OF HIP Right 11/22/2021    Procedure: PINNING, HIP, PERCUTANEOUS;  Surgeon: Navneet Rios MD;  [facility]: [facility];  Service: Orthopedics;  Laterality: Right;     FAMILY MEDICAL HISTORY:  Family History   Problem Relation Name Age of Onset    Heart disease Neg Hx       SOCIAL HISTORY:  Social History     Tobacco Use    Smoking status: Never    Smokeless tobacco: Never   Substance Use Topics    Alcohol use: No    Drug use: Never     ALLERGIES:  Review of patient's allergies indicates:  No Known Allergies  HOME MEDICATIONS:  Prior to Admission medications    Medication Sig Start Date End Date Taking? Authorizing Provider   aspirin (ECOTRIN) 81 MG EC tablet Take 1 tablet (81 mg total) by mouth once daily.  Patient taking differently: Take 81 mg by mouth once daily. 08:00 6/21/23 6/20/24 Yes Dallin Pleitez MD   cephALEXin (KEFLEX) 500 MG capsule Take 500 mg by mouth every 8 (eight) hours. 5/13/24  Yes Provider, Historical   donepeziL (ARICEPT) 5 MG tablet Take 1 tablet (5 mg total) by mouth once daily.  Patient taking differently: Take 5 mg by mouth once daily. 08:00 6/21/23  Yes Dallin Pleitez MD   EScitalopram oxalate (LEXAPRO) 10 MG tablet Take 1 tablet (10 mg total) by mouth every evening.  Patient taking differently: Take 10 mg by mouth every evening. 18:00 2/7/24  Yes  Dallin Pleitez MD   ferrous sulfate (FEOSOL) 325 mg (65 mg iron) Tab tablet Take 1 tablet (325 mg total) by mouth once daily. 4/29/24 5/29/24 Yes Yumi Mariscal MD   PAIN RELIEF, ACETAMINOPHEN, 500 mg tablet Take 1,000 mg by mouth every evening. 19:00 4/18/24  Yes Provider, Historical   QUEtiapine (SEROQUEL) 100 MG Tab Take 1 tablet (100 mg total) by mouth every evening.  Patient taking differently: Take 100 mg by mouth every evening. 19:00 4/8/24 4/8/25 Yes Dallin Pleitez MD     CURRENT SCHEDULED MEDICATIONS:   aspirin  81 mg Oral Daily    cefTRIAXone (Rocephin) IV (PEDS and ADULTS)  1 g Intravenous Q24H    donepeziL  5 mg Oral Daily    enoxparin  40 mg Subcutaneous Daily    EScitalopram oxalate  10 mg Oral QHS    pantoprazole  40 mg Oral Daily    polyethylene glycol  17 g Oral Daily    QUEtiapine  100 mg Oral QHS     CURRENT INFUSIONS:    CURRENT PRN MEDICATIONS:    Current Facility-Administered Medications:     acetaminophen, 650 mg, Oral, Q8H PRN    acetaminophen, 650 mg, Oral, Q4H PRN    aluminum & magnesium hydroxide-simethicone, 15 mL, Oral, QID PRN    dextrose 10%, 12.5 g, Intravenous, PRN    dextrose 10%, 25 g, Intravenous, PRN    glucagon (human recombinant), 1 mg, Intramuscular, PRN    glucose, 16 g, Oral, PRN    glucose, 24 g, Oral, PRN    magnesium oxide, 800 mg, Oral, PRN    magnesium oxide, 800 mg, Oral, PRN    melatonin, 9 mg, Oral, Nightly PRN    naloxone, 0.02 mg, Intravenous, PRN    ondansetron, 8 mg, Intravenous, Q6H PRN    potassium bicarbonate, 35 mEq, Oral, PRN    potassium bicarbonate, 50 mEq, Oral, PRN    potassium bicarbonate, 60 mEq, Oral, PRN    potassium, sodium phosphates, 2 packet, Oral, PRN    potassium, sodium phosphates, 2 packet, Oral, PRN    potassium, sodium phosphates, 2 packet, Oral, PRN    simethicone, 1 tablet, Oral, QID PRN    sodium chloride 0.9%, 3 mL, Intravenous, Q12H PRN    REVIEW OF SYSTEMS:  A review of systems cannot be obtained as the patient is unable to  respond to questions appropriately.       PHYSICAL EXAM:  Patient Vitals for the past 24 hrs:   BP Temp Temp src Pulse Resp SpO2   05/17/24 0821 -- -- -- 75 18 96 %   05/17/24 0742 (!) 145/60 -- -- 61 -- 95 %   05/17/24 0328 137/75 98.6 °F (37 °C) Oral 65 18 95 %   05/16/24 2313 (!) 118/56 98.3 °F (36.8 °C) Oral 77 18 95 %   05/16/24 1955 -- -- -- 74 18 95 %   05/16/24 1926 (!) 118/58 98.3 °F (36.8 °C) Oral 69 18 95 %   05/16/24 1549 (!) 131/58 98.6 °F (37 °C) Oral 67 18 97 %   05/16/24 1132 122/78 -- -- 87 -- --   05/16/24 1131 (!) 106/57 -- -- 77 -- --   05/16/24 1130 (!) 117/56 -- -- 80 -- --   05/16/24 1113 (!) 109/54 98.4 °F (36.9 °C) Oral 72 18 95 %       GENERAL APPEARANCE: Alert, well-developed, well-nourished female in no acute distress.  HEENT: Normocephalic and atraumatic. PERRL. Oropharynx unremarkable.  PULM: Normal respiratory effort. No accessory muscle use.  CV: RRR.  ABDOMEN: Soft, nontender.  EXTREMITIES: No obvious signs of vascular compromise. Pulses present. No cyanosis, clubbing or edema.  SKIN: Clear; no rashes, lesions or skin breaks in exposed areas.    NEURO:  MENTAL STATUS:  Alert, elderly female.  She is oriented to self but not to place or time.  CRANIAL NERVES:  Pupils equal round and reactive to light, extraocular movements are intact, face is grossly symmetric.    MOTOR:  Bulk normal. Tone normal and symmetric throughout.  Abnormal movements absent.  Tremor: none present.  Strength  4+/5 in bilateral upper extremities and 4/5 in bilateral lower extremities .    REFLEXES:  DTRs 1+ throughout.  Plantar response equivocal bilaterally.  SENSATION: grossly intact throughout.  COORDINATION:  Not tested .  STATION: not tested.  GAIT: not tested.        Labs:  Recent Labs   Lab 05/15/24  2019 05/16/24  0138 05/16/24  0344 05/17/24  0423    140  --  139   K 3.8 4.5  --  3.9    105  --  105   CO2 25 25  --  24   BUN 27 26  --  18   CREATININE 0.7 0.8  --  0.8   * 107  --  88  "  CALCIUM 8.5* 8.9  --  8.7   PHOS  --   --  4.1 4.0   MG 1.9  --  2.0 1.9     Recent Labs   Lab 05/15/24  2019 05/16/24  0344 05/17/24  0423   WBC 5.55 5.05 4.43   HGB 8.4* 8.6* 8.4*   HCT 28.9* 29.8* 28.6*    316 328     Recent Labs   Lab 05/15/24  2019 05/16/24  0138 05/17/24  0423   ALBUMIN 3.6 3.2* 2.9*   PROT 6.1 6.2 5.7*   BILITOT 0.4 0.3 0.2   ALKPHOS 67 72 64   ALT 9* 12 11   AST 18 16 16     Lab Results   Component Value Date    INR 0.9 04/27/2024     Lab Results   Component Value Date    TRIG 95 04/06/2022    HDL 76 04/06/2022    CHOLHDL 3.2 04/06/2022     Lab Results   Component Value Date    HGBA1C 5.6 04/27/2024     No results found for: "PROTEINCSF", "GLUCCSF", "WBCCSF"    Imaging:  I have reviewed and interpreted the pertinent imaging and lab results.      CT Head Without Contrast  Narrative: EXAMINATION:  CT HEAD WITHOUT CONTRAST    CLINICAL HISTORY:  Syncope, recurrent;    TECHNIQUE:  Low dose axial images were obtained through the head.  Coronal and sagittal reformations were also performed. Contrast was not administered.    COMPARISON:  12/22/2021    FINDINGS:  No acute intracranial hemorrhage.    Moderate prominence of the ventricles and sulci compatible with generalized cerebral volume loss.  No hydrocephalus.    Mild scattered hypoattenuation within the supratentorial white matter, nonspecific but probably reflecting sequelae of chronic small vessel ischemic change.  Gray-white matter differentiation appears within normal limits without evidence of acute major vascular territory infarct.  No significant mass effect or midline shift.    No displaced calvarial fracture.    Moderate patchy opacification of the posterior right ethmoid air cells with aerated secretions.  Mastoid air cells are clear.    Visualized portions of the orbits are normal.  Impression: 1. No evidence of an acute intracranial abnormality.  2. Moderate generalized cerebral volume loss and mild scattered nonspecific " supratentorial white matter hypoattenuation probably reflecting sequelae of chronic small vessel ischemic change.    Electronically signed by: Jay Wiggins  Date:    05/16/2024  Time:    10:14         ASSESSMENT & PLAN:    Syncope  Urinary tract infection  Hypotension    Plan:   Etiology of syncope is unknown however could be multifactorial in the setting of hypotension, infection.  There was reports of shaking associated with the syncopal spell and can not rule out a seizure.  EEG was done showed no epileptiform activity.    CT head was negative for acute intracranial pathology.  Carotid ultrasound showed no significant carotid stenosis.    Apparently this is her 2nd spell of syncope and during her prior spell, she was recommended outpatient workup however she did not follow.  I recommend outpatient LTM EEG possible to better characterize these syncopal spells.    Will hold off on antiseizure medications at this time as there is no clear convincing evidence of a seizure.  Management of metabolic derangements and infectious abnormalities per primary team.    Recommend Cardiac workup for syncopal spell  Will need to address goals of care.  Outpatient Neurology follow-up.           Thank you kindly for including us in the care of this patient. Please do not hesitate to contact us with any questions.           Santy Mcfadden MD  Neurology/vascular Neurology  Date of Service: 05/17/2024  9:39 AM    --------------------------------------------------------------------------------------------------------------------------------------------------------------------------------------------------------------------------------------------------------------  Please note: This note was transcribed using voice recognition software. Because of this technology there are often uinintended grammatical, spelling, and other transcription errors. Please disregard these errors.

## 2024-05-17 NOTE — PLAN OF CARE
Inbasket message sent to Dr. Pleitez's office to schedule follow up appointment.  Office information added to AVS.    Faxed facesheet to [facility] to schedule follow up appointment.  Office information added to AVS.    Faxed clinicals to Anamaria Longo ([phone])

## 2024-05-17 NOTE — PLAN OF CARE
Plan of care reviewed with patient. Needs reinforcement due to confusion. IV intact and patent. No complaint of pain noted. Mccord in place and draining. Tele in place and being monitored. Camera at bedside to increase safety. Safety maintained. Call light in reach and instructed to call for assistance. Will continue to monitor.

## 2024-05-17 NOTE — CARE UPDATE
05/17/24 0821   Patient Assessment/Suction   Level of Consciousness (AVPU) alert   Respiratory Effort Normal;Unlabored   Expansion/Accessory Muscles/Retractions no use of accessory muscles;no retractions;expansion symmetric   Rhythm/Pattern, Respiratory unlabored;depth regular;pattern regular;no shortness of breath reported   PRE-TX-O2   Device (Oxygen Therapy) room air   SpO2 96 %   Pulse Oximetry Type Intermittent   $ Pulse Oximetry - Multiple Charge Pulse Oximetry - Multiple   Pulse 75   Resp 18

## 2024-05-17 NOTE — CARE UPDATE
05/16/24 1955   Patient Assessment/Suction   Level of Consciousness (AVPU) alert   Respiratory Effort Unlabored   Expansion/Accessory Muscles/Retractions no use of accessory muscles;expansion symmetric   All Lung Fields Breath Sounds equal bilaterally;diminished   Rhythm/Pattern, Respiratory unlabored;pattern regular   Cough Frequency no cough   PRE-TX-O2   Device (Oxygen Therapy) room air   SpO2 95 %   Pulse Oximetry Type Intermittent   $ Pulse Oximetry - Multiple Charge Pulse Oximetry - Multiple   Pulse 74   Resp 18

## 2024-05-17 NOTE — PLAN OF CARE
Pt established with [facility].  Referral sent through [facility] to resume service.       05/17/24 1152   Post-Acute Status   Post-Acute Authorization Hospice   Hospice Status Referrals Sent

## 2024-05-20 ENCOUNTER — TELEPHONE (OUTPATIENT)
Dept: FAMILY MEDICINE | Facility: CLINIC | Age: 89
End: 2024-05-20
Payer: MEDICARE

## 2024-05-20 NOTE — TELEPHONE ENCOUNTER
----- Message from Zenia Varner MA sent at 5/17/2024  8:59 AM CDT -----  Call patient - needs post-hospital phone call within 2 business days and hospital follow up visit scheduled within 7-14 days.    Expected discharge-    Scheduled. -

## 2024-05-20 NOTE — TELEPHONE ENCOUNTER
----- Message from Sadie Landaverde RN sent at 5/17/2024  3:37 PM CDT -----  Regarding: Hospital follow up  Good afternoon,  Pt being discharged from [facility] back to [facility].  Please contact to schedule follow up appointment.    Thank you,    Sadie

## 2024-05-21 NOTE — HOSPITAL COURSE
Patient monitor closely during hospitalization.  She was placed on telemetry.   Nursing staff from [facility] reported seizure-like activity.  EEG obtained and does not demonstrate epilepticus discharges.  Neurology consulted.    She was treated for urinary tract infection. Patient is medically stable for discharge from a neurological standpoint.    Email sent to nephew, POA, who states patient is currently enrolled in hospice at [facility].   Patient to return to [facility] with hospice.

## 2024-05-21 NOTE — DISCHARGE SUMMARY
[facility] Medicine  Discharge Summary      Patient Name: Keila Romano  MRN: [id number]  [facility]: [id number]  Patient Class: OP- Observation  Admission Date: 5/15/2024  Hospital Length of Stay: 0 days  Discharge Date and Time: 5/17/2024  6:56 PM  Attending Physician: No att. providers found   Discharging Provider: Asha Cao NP  Primary Care Provider: Dallin Pleitez MD    Primary Care Team: Networked reference to record PCT     HPI:   Keila Romano is a 89 year old female with a past medical history of dementia and anemia, who presented to the ED from [facility] unit for a syncopal episode. HPI limited due to history of dementia. Per ED notes, the patient was sitting, passed out and woke with complaints of chest pain, mumbling and dilated pupils, then slowly returned to baseline. She was noted to be hypotensive with a systolic blood pressure in the 90's, but otherwise normal vital signs. She is oriented to person only, which is her baseline. She was admitted about one month ago for similar complaints, was found to be anemic with a hemoglobin of 5.5 and transfused at that time. She had a full work up done including carotid ultrasound, which showed no significant stenosis and an echo which showed an EF of 50-52% and moderate to severe aortic valve stenosis. She was to follow up with cardiology and GI on discharge but does not appear to have done so.     ED work up included a CBC, which showed baseline anemia. CMP  was unremarkable. HS Troponin x 2 performed, and resulted at 10.5 and 11.1 respectively. BNP 74. Urinalysis positive for evidence of infection.  CXR showed no acute cardiopulmonary abnormalities. EKG showed LBBB, which was seen on previous EKG's. Hospital Medicine consulted for admission and further management.    * No surgery found *      Hospital Course:     Patient monitor closely during hospitalization.  She was placed on telemetry.   Nursing staff  from [facility] reported seizure-like activity.  EEG obtained and does not demonstrate epilepticus discharges.  Neurology consulted.    She was treated for urinary tract infection. Patient is medically stable for discharge from a neurological standpoint.    Email sent to nephew, POA, who states patient is currently enrolled in hospice at [facility].   Patient to return to [facility] with hospice.     Goals of Care Treatment Preferences:  Code Status: DNR    Living Will: Yes              Consults:   Consults (From admission, onward)          Status Ordering Provider     Inpatient consult to Neurology  Once        Provider:  Santy Mcfadden MD    Completed NESTOR MEZA            Renal/  UTI (urinary tract infection)  Follow urine culture  Rocephin IV        Final Active Diagnoses:    Diagnosis Date Noted POA    PRINCIPAL PROBLEM:  Syncope [R55] 04/26/2024 Yes    UTI (urinary tract infection) [N39.0] 05/16/2024 Yes    Syncope and collapse [R55] 05/16/2024 Yes    Murmur [R01.1] 04/26/2024 Yes    Iron deficiency anemia [D50.9] 04/26/2024 Yes    Dementia with behavioral disturbance [F03.918] 11/24/2021 Yes      Problems Resolved During this Admission:       Discharged Condition: stable    Disposition: jail Nursing Facili[facility]    Follow Up:   Follow-up Information       Dallin Pleitez MD Follow up in 1 week(s).    Specialties: Family Medicine, Home Health Services, Hospice Services  Contact information:  [address]  [address]  [place] [address]  [phone]               Santy Mcfadden MD Follow up.    Specialty: Neurology  Why: Office will contact you to schedule follow up appointment.  If you don't hear from them within a week, please contact office to schedule.  Contact information:  [address]  [place] [address]  [phone]                           Patient Instructions:      Diet Adult Regular     Notify your health care provider if you experience any of the  following:  persistent dizziness, light-headedness, or visual disturbances     Notify your health care provider if you experience any of the following:  severe persistent headache     Activity as tolerated       Significant Diagnostic Studies: N/A    Pending Diagnostic Studies:       None           Medications:  Reconciled Home Medications:      Medication List        CHANGE how you take these medications      aspirin 81 MG EC tablet  Commonly known as: ECOTRIN  Take 1 tablet (81 mg total) by mouth once daily.  What changed: additional instructions     donepeziL 5 MG tablet  Commonly known as: ARICEPT  Take 1 tablet (5 mg total) by mouth once daily.  What changed: additional instructions     EScitalopram oxalate 10 MG tablet  Commonly known as: LEXAPRO  Take 1 tablet (10 mg total) by mouth every evening.  What changed: additional instructions     QUEtiapine 100 MG Tab  Commonly known as: SEROQUEL  Take 1 tablet (100 mg total) by mouth every evening.  What changed: additional instructions            CONTINUE taking these medications      cephALEXin 500 MG capsule  Commonly known as: KEFLEX  Take 500 mg by mouth every 8 (eight) hours.     ferrous sulfate 325 mg (65 mg iron) Tab tablet  Commonly known as: FEOSOL  Take 1 tablet (325 mg total) by mouth once daily.     PAIN RELIEF (ACETAMINOPHEN) 500 mg tablet  Generic drug: acetaminophen  Take 1,000 mg by mouth every evening. 19:00              Indwelling Lines/Drains at time of discharge:   Lines/Drains/Airways       None                   Time spent on the discharge of patient: 45 minutes         Asha Cao NP  Department of Hospital Medicine  [facility]/Surg

## 2024-06-07 ENCOUNTER — HOSPITAL ENCOUNTER (EMERGENCY)
Facility: HOSPITAL | Age: 89
Discharge: HOME | End: 2024-06-07
Attending: STUDENT IN AN ORGANIZED HEALTH CARE EDUCATION/TRAINING PROGRAM
Payer: MEDICARE

## 2024-06-07 VITALS
SYSTOLIC BLOOD PRESSURE: 140 MMHG | HEIGHT: 62 IN | DIASTOLIC BLOOD PRESSURE: 64 MMHG | HEART RATE: 72 BPM | RESPIRATION RATE: 16 BRPM | TEMPERATURE: 98 F | WEIGHT: 112 LBS | BODY MASS INDEX: 20.61 KG/M2 | OXYGEN SATURATION: 95 %

## 2024-06-07 DIAGNOSIS — S00.03XA HEMATOMA OF OCCIPITAL REGION OF SCALP: ICD-10-CM

## 2024-06-07 DIAGNOSIS — S09.90XA TRAUMATIC INJURY OF HEAD, INITIAL ENCOUNTER: Primary | ICD-10-CM

## 2024-06-07 DIAGNOSIS — S19.80XA BLUNT TRAUMA OF NECK, INITIAL ENCOUNTER: ICD-10-CM
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
## 2024-06-07 PROCEDURE — 99284 EMERGENCY DEPT VISIT MOD MDM: CPT | Mod: 25

## 2024-06-07 NOTE — ED NOTES
Patient with ground level fall when standing from her wheelchair.  Noted to have a hematoma to the back of the head.  No noted blood thinner noted in chart.  Patient is currently on hospice for dementia.

## 2024-06-07 NOTE — ED PROVIDER NOTES
Encounter Date: 6/7/2024       History     Chief Complaint   Patient presents with    Fall     Patient from [facility] [place].  Currently on hospice for dementia.  Reported from EMS that the patient was in her wheelchair and someone was holding it, the patient attempted to stand and fell backwards, hitting her her.  No reported LOC.  Patient does have hematoma on occipital lobe.  No blood thinners noted.       89-year-old female presents for evaluation of head injury.  Patient had witnessed mechanical fall from wheelchair.  Associated bruise to back of head.  No associated new neurological deficits    The history is provided by the patient.     Review of patient's allergies indicates:  No Known Allergies  Past Medical History:   Diagnosis Date    Dementia     Hip fracture     L    Iron deficiency anemia, unspecified      Past Surgical History:   Procedure Laterality Date    PERCUTANEOUS PINNING OF HIP Right 11/22/2021    Procedure: PINNING, HIP, PERCUTANEOUS;  Surgeon: Navneet Rios MD;  Location: [facility];  Service: Orthopedics;  Laterality: Right;     Family History   Problem Relation Name Age of Onset    Heart disease Neg Hx       Social History     Tobacco Use    Smoking status: Never    Smokeless tobacco: Never   Substance Use Topics    Alcohol use: No    Drug use: Never     Review of Systems   All other systems reviewed and are negative.      Physical Exam     Initial Vitals [06/07/24 1507]   BP Pulse Resp Temp SpO2   (!) 145/69 74 17 97.9 °F (36.6 °C) (!) 93 %      MAP       --         Physical Exam    Nursing note and vitals reviewed.  Constitutional: No distress.   HENT:   Head: Normocephalic.   Occipital hematoma   Eyes: No scleral icterus.   Cardiovascular:  Normal rate.           Pulmonary/Chest: Effort normal. No stridor. No respiratory distress.   Abdominal: There is no guarding.   Musculoskeletal:      Comments: No extremity or spinal or trunk tenderness palpation     Neurological: She is alert.    GCS 15, able to move all extremities no hypersensitivity pain to light touch   Skin: No rash noted. No erythema.         ED Course   Procedures  Labs Reviewed - No data to display       Imaging Results              CT Cervical Spine Without Contrast (Final result)  Result time 06/07/24 16:09:08      Final result by Dawson Ruvalcaba DO (06/07/24 16:09:08)                   Impression:      Cervical spine degenerative changes  without acute abnormality.      Electronically signed by: Dawson Ruvalcaba  Date:    06/07/2024  Time:    16:09               Narrative:      CMS MANDATED QUALITY DATA - CT RADIATION - 436    All CT scans at this facility utilize dose modulation, iterative reconstruction, and/or weight based dosing when appropriate to reduce radiation dose to as low as reasonably achievable.    EXAMINATION:  CT CERVICAL SPINE WITHOUT CONTRAST    CLINICAL HISTORY:  Neck trauma (Age >= 65y);    TECHNIQUE:  Cervical spine CT without IV contrast obtained with coronal and sagittal reformations.    COMPARISON:  None    FINDINGS:  Negative for fracture. There is multilevel intervertebral disc height loss with vertebral body marginal osteophytes.  There is multilevel facet and uncovertebral joint arthropathy.  C1 and C2 are properly aligned.  The odontoid process is intact.    Cervical soft tissues are unremarkable. Visualized lung apices are unremarkable.                                       CT Head Without Contrast (Final result)  Result time 06/07/24 15:54:52      Final result by Ryan Arcos MD (06/07/24 15:54:52)                   Impression:      No acute intracranial hemorrhage or acute calvarial fracture.      Electronically signed by: Ryan Arcos  Date:    06/07/2024  Time:    15:54               Narrative:    EXAMINATION:  CT HEAD WITHOUT CONTRAST    CLINICAL HISTORY:  Head trauma, moderate-severe;    FINDINGS:  Comparison to head CT of 05/16/2024. There is no acute intracranial hemorrhage, with no mass,  mass effect or abnormal extra-axial fluid. Gray-white differentiation is normal, with the cortical sulci and ventricles prominent.  Mild scattered hypoattenuating foci in the white matter suggest chronic microvascular ischemic changes.  The cerebellum and brainstem are grossly unremarkable.  There are dense carotid siphon vascular calcifications.    The calvarium is intact, with no acute fractures. The visualized paranasal sinuses and mastoid air cells are clear.  There is a left parietal scalp hematoma, with no radiopaque foreign bodies.                                       Medications - No data to display  Medical Decision Making  89-year-old female presents for evaluation of head trauma, mechanical fall, nonsyncopal, no new focal deficits.  Do not suspect any severe CNS hemorrhage, CT head and C-spine with no acute fracture or intracranial hemorrhage.  Patient has family friend at bedside be discharged home back to facility.  No other suspicion for any solid or hollow organ injury.  No lacerations    Amount and/or Complexity of Data Reviewed  Radiology: ordered.                                      Clinical Impression:  Final diagnoses:  [S09.90XA] Traumatic injury of head, initial encounter (Primary)  [S00.03XA] Hematoma of occipital region of scalp  [S19.80XA] Blunt trauma of neck, initial encounter  [W19.XXXA] Fall, initial encounter          ED Disposition Condition    Discharge Stable          ED Prescriptions    None       Follow-up Information       Follow up With Specialties Details Why Contact Info Additional Information    [facility] - Emergency Dept Emergency Medicine In 1 day As needed, If symptoms worsen [address] [facility] [address]  [phone] 1st floor    Dallin Pleitez MD Family Medicine, Home Health Services, Hospice Services In 1 week  [address]  [address]  [facility] [address]  [phone]                Dameon Cui Jr., DO  06/07/24 [id number]

## 2024-06-08 LAB
OHS QRS DURATION: 138 MS
OHS QTC CALCULATION: 490 MS

## 2024-06-14 ENCOUNTER — OUTSIDE PLACE OF SERVICE (OUTPATIENT)
Dept: FAMILY MEDICINE | Facility: CLINIC | Age: 89
End: 2024-06-14
Payer: MEDICARE

## 2024-06-14 DIAGNOSIS — F03.90 DEMENTIA: Primary | ICD-10-CM

## 2024-06-14 DIAGNOSIS — Z74.1 NEED FOR ASSISTANCE WITH PERSONAL CARE: ICD-10-CM

## 2024-06-14 DIAGNOSIS — M19.90 OA (OSTEOARTHRITIS): ICD-10-CM

## 2024-06-14 DIAGNOSIS — R55 SYNCOPE AND COLLAPSE: ICD-10-CM

## 2024-06-14 DIAGNOSIS — D64.9 ANEMIA: ICD-10-CM

## 2024-06-14 PROCEDURE — 1159F MED LIST DOCD IN RCRD: CPT | Mod: CPTII,S$GLB,, | Performed by: FAMILY MEDICINE

## 2024-06-14 PROCEDURE — 1160F RVW MEDS BY RX/DR IN RCRD: CPT | Mod: CPTII,S$GLB,, | Performed by: FAMILY MEDICINE

## 2024-06-14 PROCEDURE — 99349 HOME/RES VST EST MOD MDM 40: CPT | Mod: S$GLB,,, | Performed by: FAMILY MEDICINE

## 2024-08-19 PROBLEM — N39.0 UTI (URINARY TRACT INFECTION): Status: RESOLVED | Noted: 2024-01-01 | Resolved: 2024-01-01

## 2024-11-18 ENCOUNTER — TELEPHONE (OUTPATIENT)
Dept: FAMILY MEDICINE | Facility: CLINIC | Age: 89
End: 2024-11-18
Payer: MEDICARE

## 2024-11-18 NOTE — TELEPHONE ENCOUNTER
----- Message from Asha sent at 11/18/2024  9:22 AM CST -----  Pt nephew eron bettencourt came by to let us know that she passed on 11/15

## (undated) DEVICE — STRAP OR TABLE 5IN X 72IN

## (undated) DEVICE — SLEEVE SCD EXPRESS KNEE MEDIUM

## (undated) DEVICE — SPONGE BULKEE II ABSRB 6X6.75

## (undated) DEVICE — DRAPE C ARM 42 X 120 10/BX

## (undated) DEVICE — WIRE 2.8 X 300 MM THREADED
Type: IMPLANTABLE DEVICE | Site: HIP | Status: NON-FUNCTIONAL
Removed: 2021-11-22

## (undated) DEVICE — GLOVE SURG ULTRA TOUCH 8.5

## (undated) DEVICE — BIT DRILL CANNL SS 5.0MM 300/2

## (undated) DEVICE — SUT MONOCRYL 3-0 PS-1

## (undated) DEVICE — CLOSURE SKIN STERI STRIP 1/2X4

## (undated) DEVICE — ADHESIVE MASTISOL VIAL 48/BX

## (undated) DEVICE — DRAPE C-ARMOR EQUIPMENT COVER

## (undated) DEVICE — SOL 9P NACL IRR PIC IL

## (undated) DEVICE — APPLICATOR CHLORAPREP ORN 26ML

## (undated) DEVICE — PADDING CAST SPECIALIST 6X4YD

## (undated) DEVICE — BANDAGE MATRIX HK LOOP 6IN 5YD

## (undated) DEVICE — SCREW CANN 16MM THRD 6.5X80 SS
Type: IMPLANTABLE DEVICE | Site: LEG | Status: NON-FUNCTIONAL
Removed: 2021-11-22

## (undated) DEVICE — PACK BASIC

## (undated) DEVICE — DRAPE IOBAN 2 STERI

## (undated) DEVICE — GLOVE SURG ULTRA TOUCH 7

## (undated) DEVICE — DRESSING TRANS 6X8 TEGADERM

## (undated) DEVICE — PAD PERI POST REPLACEMNT

## (undated) DEVICE — BNDG COFLEX FOAM LF2 ST 6X5YD

## (undated) DEVICE — UNDERGLOVES BIOGEL PI SZ 7 LF

## (undated) DEVICE — SEE MEDLINE ITEM 157117

## (undated) DEVICE — LINER SUCTION 3000CC

## (undated) DEVICE — PACK CUSTOM UNIV BASIN SLI

## (undated) DEVICE — UNDERGLOVES BIOGEL PI SIZE 8.5